# Patient Record
Sex: FEMALE | Race: WHITE | Employment: OTHER | URBAN - METROPOLITAN AREA
[De-identification: names, ages, dates, MRNs, and addresses within clinical notes are randomized per-mention and may not be internally consistent; named-entity substitution may affect disease eponyms.]

---

## 2017-10-24 PROBLEM — R05.8 POST-VIRAL COUGH SYNDROME: Status: ACTIVE | Noted: 2017-10-24

## 2017-11-08 PROBLEM — G89.29 CHRONIC RIGHT SHOULDER PAIN: Status: ACTIVE | Noted: 2017-11-08

## 2017-11-08 PROBLEM — M25.511 CHRONIC RIGHT SHOULDER PAIN: Status: ACTIVE | Noted: 2017-11-08

## 2017-12-04 ENCOUNTER — HOSPITAL ENCOUNTER (OUTPATIENT)
Dept: MAMMOGRAPHY | Age: 62
Discharge: HOME | End: 2017-12-04
Attending: OBSTETRICS & GYNECOLOGY
Payer: COMMERCIAL

## 2017-12-04 DIAGNOSIS — Z12.31 VISIT FOR SCREENING MAMMOGRAM: ICD-10-CM

## 2017-12-04 PROCEDURE — 77067 SCR MAMMO BI INCL CAD: CPT

## 2017-12-05 PROBLEM — F11.90 CHRONIC NARCOTIC USE: Status: ACTIVE | Noted: 2017-12-05

## 2018-02-19 PROBLEM — M25.511 CHRONIC RIGHT SHOULDER PAIN: Status: RESOLVED | Noted: 2017-11-08 | Resolved: 2018-02-19

## 2018-02-19 PROBLEM — G89.29 CHRONIC RIGHT SHOULDER PAIN: Status: RESOLVED | Noted: 2017-11-08 | Resolved: 2018-02-19

## 2018-04-17 PROBLEM — G89.29 OTHER CHRONIC PAIN: Status: ACTIVE | Noted: 2018-04-17

## 2018-08-28 PROBLEM — M48.062 SPINAL STENOSIS OF LUMBAR REGION WITH NEUROGENIC CLAUDICATION: Status: ACTIVE | Noted: 2018-08-28

## 2018-08-28 PROBLEM — M54.10 RADICULOPATHY AFFECTING UPPER EXTREMITY: Status: ACTIVE | Noted: 2018-08-28

## 2018-09-02 ENCOUNTER — HOSPITAL ENCOUNTER (EMERGENCY)
Age: 63
Discharge: HOME | End: 2018-09-02
Attending: EMERGENCY MEDICINE
Payer: MEDICARE

## 2018-09-02 VITALS
DIASTOLIC BLOOD PRESSURE: 63 MMHG | RESPIRATION RATE: 18 BRPM | SYSTOLIC BLOOD PRESSURE: 137 MMHG | TEMPERATURE: 98 F | HEART RATE: 81 BPM

## 2018-09-02 DIAGNOSIS — M54.2 CHRONIC NECK PAIN: Primary | ICD-10-CM

## 2018-09-02 DIAGNOSIS — G89.29 CHRONIC NECK PAIN: Primary | ICD-10-CM

## 2018-09-02 PROCEDURE — 99283 EMERGENCY DEPT VISIT LOW MDM: CPT | Performed by: EMERGENCY MEDICINE

## 2018-09-02 RX ORDER — DICLOFENAC SODIUM 50 MG/1
50 TABLET, DELAYED RELEASE ORAL 2 TIMES DAILY
Qty: 14 TAB | Refills: 0 | Status: SHIPPED | OUTPATIENT
Start: 2018-09-02 | End: 2018-10-10 | Stop reason: CLARIF

## 2018-09-02 RX ORDER — PREDNISONE 20 MG/1
20 TABLET ORAL DAILY
Qty: 5 TAB | Refills: 0 | Status: SHIPPED | OUTPATIENT
Start: 2018-09-02 | End: 2018-09-07

## 2018-09-02 NOTE — ED NOTES
I have reviewed discharge instructions with the patient. The patient verbalized understanding. Patient left ED via Discharge Method: ambulatory to Home with self. Opportunity for questions and clarification provided. Patient given 2 scripts. To continue your aftercare when you leave the hospital, you may receive an automated call from our care team to check in on how you are doing. This is a free service and part of our promise to provide the best care and service to meet your aftercare needs.  If you have questions, or wish to unsubscribe from this service please call [phone]. Thank you for Choosing our [facility] Emergency Department.

## 2018-09-02 NOTE — ED TRIAGE NOTES
Patient complains of tingling to hands with difficulty using hands after she writes or does similar tasks for more than 30 minutes at a time. Patient states she has had difficulties with her hands over the past 1 year due to damage to nerves in her neck and feels that it has been getting worse. Patient states she sees pain management for the nerve damage and other chronic back pain issues but the medications are not working well for her over the past 1 week. Patient states pain management did a nerve conduction study done in July and was unable to make her pain management appointment in august so cannot branden her regular medications until 9/10/18 when she has her next appointment.

## 2018-09-02 NOTE — DISCHARGE INSTRUCTIONS
Return with any fevers, vomiting, worsening symptoms, or additional concerns. Follow-up with your neck and back doctor in 3 or 4 days for reevaluation.

## 2018-10-09 RX ORDER — CEFAZOLIN SODIUM/WATER 2 G/20 ML
2 SYRINGE (ML) INTRAVENOUS ONCE
Status: CANCELLED | OUTPATIENT
Start: 2018-10-09 | End: 2018-10-09

## 2018-10-10 ENCOUNTER — HOSPITAL ENCOUNTER (OUTPATIENT)
Dept: SURGERY | Age: 63
Discharge: HOME | End: 2018-10-10
Payer: MEDICARE

## 2018-10-10 VITALS
SYSTOLIC BLOOD PRESSURE: 136 MMHG | HEART RATE: 80 BPM | BODY MASS INDEX: 22.32 KG/M2 | RESPIRATION RATE: 16 BRPM | HEIGHT: 63 IN | WEIGHT: 126 LBS | OXYGEN SATURATION: 99 % | TEMPERATURE: 98.3 F | DIASTOLIC BLOOD PRESSURE: 64 MMHG

## 2018-10-10 LAB
ANION GAP SERPL CALC-SCNC: 6 MMOL/L (ref 7–16)
APPEARANCE UR: CLEAR
BACTERIA SPEC CULT: NORMAL
BACTERIA URNS QL MICRO: ABNORMAL /HPF
BASOPHILS # BLD: 0 K/UL (ref 0–0.2)
BASOPHILS NFR BLD: 0 % (ref 0–2)
BILIRUB UR QL: NEGATIVE
BUN SERPL-MCNC: 10 MG/DL (ref 8–23)
CALCIUM SERPL-MCNC: 8.6 MG/DL (ref 8.3–10.4)
CASTS URNS QL MICRO: ABNORMAL /LPF
CHLORIDE SERPL-SCNC: 104 MMOL/L (ref 98–107)
CO2 SERPL-SCNC: 28 MMOL/L (ref 21–32)
COLOR UR: YELLOW
CREAT SERPL-MCNC: 0.83 MG/DL (ref 0.6–1)
DIFFERENTIAL METHOD BLD: ABNORMAL
EOSINOPHIL # BLD: 0.2 K/UL (ref 0–0.8)
EOSINOPHIL NFR BLD: 1 % (ref 0.5–7.8)
EPI CELLS #/AREA URNS HPF: ABNORMAL /HPF
ERYTHROCYTE [DISTWIDTH] IN BLOOD BY AUTOMATED COUNT: 13.9 %
GLUCOSE SERPL-MCNC: 119 MG/DL (ref 65–100)
GLUCOSE UR STRIP.AUTO-MCNC: NEGATIVE MG/DL
HCT VFR BLD AUTO: 36.8 % (ref 35.8–46.3)
HGB BLD-MCNC: 11.8 G/DL (ref 11.7–15.4)
HGB UR QL STRIP: ABNORMAL
IMM GRANULOCYTES # BLD: 0.1 K/UL (ref 0–0.5)
IMM GRANULOCYTES NFR BLD AUTO: 0 % (ref 0–5)
KETONES UR QL STRIP.AUTO: NEGATIVE MG/DL
LEUKOCYTE ESTERASE UR QL STRIP.AUTO: NEGATIVE
LYMPHOCYTES # BLD: 2.2 K/UL (ref 0.5–4.6)
LYMPHOCYTES NFR BLD: 19 % (ref 13–44)
MCH RBC QN AUTO: 30.9 PG (ref 26.1–32.9)
MCHC RBC AUTO-ENTMCNC: 32.1 G/DL (ref 31.4–35)
MCV RBC AUTO: 96.3 FL (ref 79.6–97.8)
MONOCYTES # BLD: 0.4 K/UL (ref 0.1–1.3)
MONOCYTES NFR BLD: 4 % (ref 4–12)
NEUTS SEG # BLD: 8.7 K/UL (ref 1.7–8.2)
NEUTS SEG NFR BLD: 75 % (ref 43–78)
NITRITE UR QL STRIP.AUTO: NEGATIVE
NRBC # BLD: 0 K/UL (ref 0–0.2)
PH UR STRIP: 6.5 [PH] (ref 5–9)
PLATELET # BLD AUTO: 306 K/UL (ref 150–450)
PMV BLD AUTO: 10.8 FL (ref 9.4–12.3)
POTASSIUM SERPL-SCNC: 3.6 MMOL/L (ref 3.5–5.1)
PROT UR STRIP-MCNC: NEGATIVE MG/DL
RBC # BLD AUTO: 3.82 M/UL (ref 4.05–5.2)
RBC #/AREA URNS HPF: ABNORMAL /HPF
SERVICE CMNT-IMP: NORMAL
SODIUM SERPL-SCNC: 138 MMOL/L (ref 136–145)
SP GR UR REFRACTOMETRY: 1.01 (ref 1–1.02)
UROBILINOGEN UR QL STRIP.AUTO: 0.2 EU/DL (ref 0.2–1)
WBC # BLD AUTO: 11.6 K/UL (ref 4.3–11.1)
WBC URNS QL MICRO: ABNORMAL /HPF

## 2018-10-10 PROCEDURE — 77030027138 HC INCENT SPIROMETER -A

## 2018-10-10 PROCEDURE — 81001 URINALYSIS AUTO W/SCOPE: CPT

## 2018-10-10 PROCEDURE — 87641 MR-STAPH DNA AMP PROBE: CPT

## 2018-10-10 PROCEDURE — 85025 COMPLETE CBC W/AUTO DIFF WBC: CPT

## 2018-10-10 PROCEDURE — 80048 BASIC METABOLIC PNL TOTAL CA: CPT

## 2018-10-10 RX ORDER — HYDROCODONE BITARTRATE AND ACETAMINOPHEN 7.5; 325 MG/1; MG/1
1 TABLET ORAL
Status: ON HOLD | COMMUNITY
End: 2018-10-17 | Stop reason: SDUPTHER

## 2018-10-10 RX ORDER — ASPIRIN 81 MG/1
81 TABLET ORAL DAILY
COMMUNITY
End: 2018-12-31 | Stop reason: CLARIF

## 2018-10-10 RX ORDER — MELOXICAM 7.5 MG/1
7.5 TABLET ORAL DAILY
COMMUNITY
End: 2019-06-07 | Stop reason: SDUPTHER

## 2018-10-10 NOTE — PERIOP NOTES
Patient verified name, , and surgery Order for consent  found in EHR and matches case posting TYPE  CASE: III  Preassessment complete Orders per surgeon:  received and dated Labs per surgeon: CBC, BMP; UA. Results:  
Labs per anesthesia protocol: T&S 
EKG:  NA Nasal Swab collected per MD order and instructions for Mupirocin nasal ointment if required. Patient provided with and instructed on education handouts including Guide to Surgery, blood transfusions, pain management, and hand hygiene for the family and community, and [facility] [place] brochure. Road to Recovery Spine surgery patient guide given. Instructed on incentive spirometry with return demonstration. Long handled prehab sponge given with instructions for use. Patient viewed spine prehab video. Hibiclens and instructions given per hospital policy. Instructed patient to continue previous medications as prescribed prior to surgery unless otherwise directed and to take the following medications the day of surgery according to anesthesia guidelines : norvasc, wellbutrin, klonopin prn, norco prn, lyrica. Instructed patient to hold  the following medications on the day of surgery: see MAR. Original medication prescription bottles visualized during patient appointment. Patient teach back successful and patient demonstrates knowledge of instruction.

## 2018-10-16 ENCOUNTER — ANESTHESIA EVENT (OUTPATIENT)
Dept: SURGERY | Age: 63
DRG: 454 | End: 2018-10-16
Payer: MEDICARE

## 2018-10-17 ENCOUNTER — APPOINTMENT (OUTPATIENT)
Dept: GENERAL RADIOLOGY | Age: 63
DRG: 454 | End: 2018-10-17
Attending: ORTHOPAEDIC SURGERY
Payer: MEDICARE

## 2018-10-17 ENCOUNTER — HOSPITAL ENCOUNTER (INPATIENT)
Age: 63
LOS: 2 days | Discharge: HOME HEALTH | DRG: 454 | End: 2018-10-19
Attending: ORTHOPAEDIC SURGERY | Admitting: ORTHOPAEDIC SURGERY
Payer: MEDICARE

## 2018-10-17 ENCOUNTER — ANESTHESIA (OUTPATIENT)
Dept: SURGERY | Age: 63
DRG: 454 | End: 2018-10-17
Payer: MEDICARE

## 2018-10-17 DIAGNOSIS — M54.10 RADICULOPATHY AFFECTING UPPER EXTREMITY: ICD-10-CM

## 2018-10-17 DIAGNOSIS — G99.2 STENOSIS OF CERVICAL SPINE WITH MYELOPATHY (HCC): Primary | ICD-10-CM

## 2018-10-17 DIAGNOSIS — M48.02 STENOSIS OF CERVICAL SPINE WITH MYELOPATHY (HCC): Primary | ICD-10-CM

## 2018-10-17 LAB
ABO + RH BLD: NORMAL
BLOOD GROUP ANTIBODIES SERPL: NORMAL
SPECIMEN EXP DATE BLD: NORMAL

## 2018-10-17 PROCEDURE — 77030016570 HC BLNKT BAIR HGGR 3M -B: Performed by: ANESTHESIOLOGY

## 2018-10-17 PROCEDURE — 77030020782 HC GWN BAIR PAWS FLX 3M -B: Performed by: ANESTHESIOLOGY

## 2018-10-17 PROCEDURE — 77030018673: Performed by: ORTHOPAEDIC SURGERY

## 2018-10-17 PROCEDURE — 77030025623 HC BUR RND PRECIS STRY -D: Performed by: ORTHOPAEDIC SURGERY

## 2018-10-17 PROCEDURE — 74011250636 HC RX REV CODE- 250/636: Performed by: ANESTHESIOLOGY

## 2018-10-17 PROCEDURE — 77030030163 HC BN WAX J&J -A: Performed by: ORTHOPAEDIC SURGERY

## 2018-10-17 PROCEDURE — 77030031139 HC SUT VCRL2 J&J -A: Performed by: ORTHOPAEDIC SURGERY

## 2018-10-17 PROCEDURE — 77030018836 HC SOL IRR NACL ICUM -A: Performed by: ORTHOPAEDIC SURGERY

## 2018-10-17 PROCEDURE — 77030002996 HC SUT SLK J&J -A: Performed by: ORTHOPAEDIC SURGERY

## 2018-10-17 PROCEDURE — 77030008703 HC TU ET UNCUF COVD -A: Performed by: ANESTHESIOLOGY

## 2018-10-17 PROCEDURE — 77030037710: Performed by: ORTHOPAEDIC SURGERY

## 2018-10-17 PROCEDURE — 74011250636 HC RX REV CODE- 250/636: Performed by: ORTHOPAEDIC SURGERY

## 2018-10-17 PROCEDURE — 77030011267 HC ELECTRD BLD COVD -A: Performed by: ORTHOPAEDIC SURGERY

## 2018-10-17 PROCEDURE — 77030008477 HC STYL SATN SLP COVD -A: Performed by: ANESTHESIOLOGY

## 2018-10-17 PROCEDURE — 65270000029 HC RM PRIVATE

## 2018-10-17 PROCEDURE — 77030018547 HC SUT ETHBND1 J&J -B: Performed by: ORTHOPAEDIC SURGERY

## 2018-10-17 PROCEDURE — 77030005518 HC CATH URETH FOL 2W BARD -B: Performed by: ORTHOPAEDIC SURGERY

## 2018-10-17 PROCEDURE — 72040 X-RAY EXAM NECK SPINE 2-3 VW: CPT

## 2018-10-17 PROCEDURE — 77030002933 HC SUT MCRYL J&J -A: Performed by: ORTHOPAEDIC SURGERY

## 2018-10-17 PROCEDURE — 77030014007 HC SPNG HEMSTAT J&J -B: Performed by: ORTHOPAEDIC SURGERY

## 2018-10-17 PROCEDURE — 77030012894: Performed by: ORTHOPAEDIC SURGERY

## 2018-10-17 PROCEDURE — 77030003861 HC BIT DRL STRY -C: Performed by: ORTHOPAEDIC SURGERY

## 2018-10-17 PROCEDURE — 0RG10A0 FUSION OF CERVICAL VERTEBRAL JOINT WITH INTERBODY FUSION DEVICE, ANTERIOR APPROACH, ANTERIOR COLUMN, OPEN APPROACH: ICD-10-PCS | Performed by: ORTHOPAEDIC SURGERY

## 2018-10-17 PROCEDURE — 0RT30ZZ RESECTION OF CERVICAL VERTEBRAL DISC, OPEN APPROACH: ICD-10-PCS | Performed by: ORTHOPAEDIC SURGERY

## 2018-10-17 PROCEDURE — 74011250636 HC RX REV CODE- 250/636

## 2018-10-17 PROCEDURE — 77030014647 HC SEAL FBRN TISSL BAXT -D: Performed by: ORTHOPAEDIC SURGERY

## 2018-10-17 PROCEDURE — 77030012406 HC DRN WND PENRS BARD -A: Performed by: ORTHOPAEDIC SURGERY

## 2018-10-17 PROCEDURE — 0RG10K1 FUSION OF CERVICAL VERTEBRAL JOINT WITH NONAUTOLOGOUS TISSUE SUBSTITUTE, POSTERIOR APPROACH, POSTERIOR COLUMN, OPEN APPROACH: ICD-10-PCS | Performed by: ORTHOPAEDIC SURGERY

## 2018-10-17 PROCEDURE — 74011000250 HC RX REV CODE- 250

## 2018-10-17 PROCEDURE — 77030039155 HC CGE SPN ANT CERV TRITANIUM STRY -G: Performed by: ORTHOPAEDIC SURGERY

## 2018-10-17 PROCEDURE — 77030013567 HC DRN WND RESERV BARD -A: Performed by: ORTHOPAEDIC SURGERY

## 2018-10-17 PROCEDURE — C1713 ANCHOR/SCREW BN/BN,TIS/BN: HCPCS | Performed by: ORTHOPAEDIC SURGERY

## 2018-10-17 PROCEDURE — 86901 BLOOD TYPING SEROLOGIC RH(D): CPT

## 2018-10-17 PROCEDURE — 76060000036 HC ANESTHESIA 2.5 TO 3 HR: Performed by: ORTHOPAEDIC SURGERY

## 2018-10-17 PROCEDURE — 76010000171 HC OR TIME 2 TO 2.5 HR INTENSV-TIER 1: Performed by: ORTHOPAEDIC SURGERY

## 2018-10-17 PROCEDURE — 74011250637 HC RX REV CODE- 250/637: Performed by: ORTHOPAEDIC SURGERY

## 2018-10-17 PROCEDURE — 77030009868 HC PIN DISTR CASPR AESC -B: Performed by: ORTHOPAEDIC SURGERY

## 2018-10-17 PROCEDURE — 77030032490 HC SLV COMPR SCD KNE COVD -B: Performed by: ORTHOPAEDIC SURGERY

## 2018-10-17 PROCEDURE — 77030039425 HC BLD LARYNG TRULITE DISP TELE -A: Performed by: ANESTHESIOLOGY

## 2018-10-17 PROCEDURE — 77030019908 HC STETH ESOPH SIMS -A: Performed by: ANESTHESIOLOGY

## 2018-10-17 PROCEDURE — 74011000250 HC RX REV CODE- 250: Performed by: ORTHOPAEDIC SURGERY

## 2018-10-17 PROCEDURE — 76210000000 HC OR PH I REC 2 TO 2.5 HR: Performed by: ORTHOPAEDIC SURGERY

## 2018-10-17 DEVICE — POLYAXIAL SCREW
Type: IMPLANTABLE DEVICE | Site: SPINE CERVICAL | Status: FUNCTIONAL
Brand: OASYS

## 2018-10-17 DEVICE — VARIABLE, SELF TAPPING SCREW
Type: IMPLANTABLE DEVICE | Site: SPINE CERVICAL | Status: FUNCTIONAL
Brand: AVIATOR

## 2018-10-17 DEVICE — ONE-LEVEL ANTERIOR PLATE
Type: IMPLANTABLE DEVICE | Site: SPINE CERVICAL | Status: FUNCTIONAL
Brand: AVIATOR

## 2018-10-17 DEVICE — ANTERIOR CERVICAL CAGE
Type: IMPLANTABLE DEVICE | Site: SPINE CERVICAL | Status: FUNCTIONAL
Brand: TRITANIUM C

## 2018-10-17 DEVICE — BLOCKER
Type: IMPLANTABLE DEVICE | Site: SPINE CERVICAL | Status: FUNCTIONAL
Brand: OASYS

## 2018-10-17 DEVICE — BIO DBM PLUS PUTTY (WITH CANCELLOUS)
Type: IMPLANTABLE DEVICE | Site: SPINE CERVICAL | Status: FUNCTIONAL
Brand: BIO DBM

## 2018-10-17 DEVICE — GRAFT BNE SUB 25CC 2MM GRAN ALLOGENIC MORPHOGENETIC PROT W: Type: IMPLANTABLE DEVICE | Site: SPINE CERVICAL | Status: FUNCTIONAL

## 2018-10-17 DEVICE — ROD
Type: IMPLANTABLE DEVICE | Site: SPINE CERVICAL | Status: FUNCTIONAL
Brand: OASYS

## 2018-10-17 RX ORDER — MORPHINE SULFATE 2 MG/ML
2 INJECTION, SOLUTION INTRAMUSCULAR; INTRAVENOUS
Status: DISCONTINUED | OUTPATIENT
Start: 2018-10-17 | End: 2018-10-18

## 2018-10-17 RX ORDER — ZOLPIDEM TARTRATE 5 MG/1
5 TABLET ORAL
Status: DISCONTINUED | OUTPATIENT
Start: 2018-10-17 | End: 2018-10-19 | Stop reason: HOSPADM

## 2018-10-17 RX ORDER — DOCUSATE SODIUM 100 MG/1
100 CAPSULE, LIQUID FILLED ORAL 2 TIMES DAILY
Status: DISCONTINUED | OUTPATIENT
Start: 2018-10-17 | End: 2018-10-19 | Stop reason: HOSPADM

## 2018-10-17 RX ORDER — LIDOCAINE HYDROCHLORIDE 20 MG/ML
INJECTION, SOLUTION EPIDURAL; INFILTRATION; INTRACAUDAL; PERINEURAL AS NEEDED
Status: DISCONTINUED | OUTPATIENT
Start: 2018-10-17 | End: 2018-10-17 | Stop reason: HOSPADM

## 2018-10-17 RX ORDER — HYDROCODONE BITARTRATE AND ACETAMINOPHEN 7.5; 325 MG/1; MG/1
1 TABLET ORAL
Qty: 40 TAB | Refills: 0 | Status: SHIPPED | OUTPATIENT
Start: 2018-10-17 | End: 2018-12-17

## 2018-10-17 RX ORDER — VALSARTAN 320 MG/1
160 TABLET ORAL DAILY
Status: DISCONTINUED | OUTPATIENT
Start: 2018-10-18 | End: 2018-10-19 | Stop reason: HOSPADM

## 2018-10-17 RX ORDER — SODIUM CHLORIDE, SODIUM LACTATE, POTASSIUM CHLORIDE, CALCIUM CHLORIDE 600; 310; 30; 20 MG/100ML; MG/100ML; MG/100ML; MG/100ML
75 INJECTION, SOLUTION INTRAVENOUS CONTINUOUS
Status: DISPENSED | OUTPATIENT
Start: 2018-10-17 | End: 2018-10-18

## 2018-10-17 RX ORDER — GLYCOPYRROLATE 0.2 MG/ML
INJECTION INTRAMUSCULAR; INTRAVENOUS AS NEEDED
Status: DISCONTINUED | OUTPATIENT
Start: 2018-10-17 | End: 2018-10-17 | Stop reason: HOSPADM

## 2018-10-17 RX ORDER — CEFAZOLIN SODIUM/WATER 2 G/20 ML
2 SYRINGE (ML) INTRAVENOUS EVERY 8 HOURS
Status: COMPLETED | OUTPATIENT
Start: 2018-10-17 | End: 2018-10-18

## 2018-10-17 RX ORDER — PROPOFOL 10 MG/ML
INJECTION, EMULSION INTRAVENOUS AS NEEDED
Status: DISCONTINUED | OUTPATIENT
Start: 2018-10-17 | End: 2018-10-17 | Stop reason: HOSPADM

## 2018-10-17 RX ORDER — SODIUM CHLORIDE 0.9 % (FLUSH) 0.9 %
5-10 SYRINGE (ML) INJECTION EVERY 8 HOURS
Status: DISCONTINUED | OUTPATIENT
Start: 2018-10-17 | End: 2018-10-19 | Stop reason: HOSPADM

## 2018-10-17 RX ORDER — BUPROPION HYDROCHLORIDE 150 MG/1
150 TABLET, EXTENDED RELEASE ORAL 2 TIMES DAILY
Status: DISCONTINUED | OUTPATIENT
Start: 2018-10-17 | End: 2018-10-19 | Stop reason: HOSPADM

## 2018-10-17 RX ORDER — OXYCODONE AND ACETAMINOPHEN 5; 325 MG/1; MG/1
1 TABLET ORAL AS NEEDED
Status: DISCONTINUED | OUTPATIENT
Start: 2018-10-17 | End: 2018-10-17 | Stop reason: HOSPADM

## 2018-10-17 RX ORDER — MEDROXYPROGESTERONE ACETATE 10 MG/1
2.5 TABLET ORAL DAILY
Status: DISCONTINUED | OUTPATIENT
Start: 2018-10-18 | End: 2018-10-19 | Stop reason: HOSPADM

## 2018-10-17 RX ORDER — CEFAZOLIN SODIUM/WATER 2 G/20 ML
2 SYRINGE (ML) INTRAVENOUS ONCE
Status: COMPLETED | OUTPATIENT
Start: 2018-10-17 | End: 2018-10-17

## 2018-10-17 RX ORDER — DEXAMETHASONE SODIUM PHOSPHATE 4 MG/ML
INJECTION, SOLUTION INTRA-ARTICULAR; INTRALESIONAL; INTRAMUSCULAR; INTRAVENOUS; SOFT TISSUE AS NEEDED
Status: DISCONTINUED | OUTPATIENT
Start: 2018-10-17 | End: 2018-10-17 | Stop reason: HOSPADM

## 2018-10-17 RX ORDER — SODIUM CHLORIDE, SODIUM LACTATE, POTASSIUM CHLORIDE, CALCIUM CHLORIDE 600; 310; 30; 20 MG/100ML; MG/100ML; MG/100ML; MG/100ML
75 INJECTION, SOLUTION INTRAVENOUS CONTINUOUS
Status: DISCONTINUED | OUTPATIENT
Start: 2018-10-17 | End: 2018-10-17 | Stop reason: HOSPADM

## 2018-10-17 RX ORDER — HYDROMORPHONE HYDROCHLORIDE 2 MG/ML
0.5 INJECTION, SOLUTION INTRAMUSCULAR; INTRAVENOUS; SUBCUTANEOUS
Status: DISCONTINUED | OUTPATIENT
Start: 2018-10-17 | End: 2018-10-17 | Stop reason: HOSPADM

## 2018-10-17 RX ORDER — SODIUM CHLORIDE, SODIUM LACTATE, POTASSIUM CHLORIDE, CALCIUM CHLORIDE 600; 310; 30; 20 MG/100ML; MG/100ML; MG/100ML; MG/100ML
INJECTION, SOLUTION INTRAVENOUS
Status: DISCONTINUED | OUTPATIENT
Start: 2018-10-17 | End: 2018-10-17 | Stop reason: HOSPADM

## 2018-10-17 RX ORDER — OXYCODONE HYDROCHLORIDE 5 MG/1
5-10 TABLET ORAL
Status: DISCONTINUED | OUTPATIENT
Start: 2018-10-17 | End: 2018-10-19 | Stop reason: HOSPADM

## 2018-10-17 RX ORDER — NEOSTIGMINE METHYLSULFATE 1 MG/ML
INJECTION INTRAVENOUS AS NEEDED
Status: DISCONTINUED | OUTPATIENT
Start: 2018-10-17 | End: 2018-10-17 | Stop reason: HOSPADM

## 2018-10-17 RX ORDER — PREGABALIN 50 MG/1
100 CAPSULE ORAL 2 TIMES DAILY
Status: DISCONTINUED | OUTPATIENT
Start: 2018-10-17 | End: 2018-10-19 | Stop reason: HOSPADM

## 2018-10-17 RX ORDER — LIDOCAINE HYDROCHLORIDE 10 MG/ML
0.1 INJECTION INFILTRATION; PERINEURAL AS NEEDED
Status: DISCONTINUED | OUTPATIENT
Start: 2018-10-17 | End: 2018-10-17 | Stop reason: HOSPADM

## 2018-10-17 RX ORDER — AMLODIPINE BESYLATE 5 MG/1
5 TABLET ORAL DAILY
Status: DISCONTINUED | OUTPATIENT
Start: 2018-10-18 | End: 2018-10-19 | Stop reason: HOSPADM

## 2018-10-17 RX ORDER — ROCURONIUM BROMIDE 10 MG/ML
INJECTION, SOLUTION INTRAVENOUS AS NEEDED
Status: DISCONTINUED | OUTPATIENT
Start: 2018-10-17 | End: 2018-10-17 | Stop reason: HOSPADM

## 2018-10-17 RX ORDER — VANCOMYCIN HYDROCHLORIDE 1 G/20ML
INJECTION, POWDER, LYOPHILIZED, FOR SOLUTION INTRAVENOUS AS NEEDED
Status: DISCONTINUED | OUTPATIENT
Start: 2018-10-17 | End: 2018-10-17 | Stop reason: HOSPADM

## 2018-10-17 RX ORDER — CYCLOBENZAPRINE HCL 10 MG
10 TABLET ORAL
Status: DISCONTINUED | OUTPATIENT
Start: 2018-10-17 | End: 2018-10-18

## 2018-10-17 RX ORDER — FAMOTIDINE 20 MG/1
20 TABLET, FILM COATED ORAL EVERY 12 HOURS
Status: DISCONTINUED | OUTPATIENT
Start: 2018-10-17 | End: 2018-10-17

## 2018-10-17 RX ORDER — SODIUM CHLORIDE 0.9 % (FLUSH) 0.9 %
5-10 SYRINGE (ML) INJECTION AS NEEDED
Status: DISCONTINUED | OUTPATIENT
Start: 2018-10-17 | End: 2018-10-17 | Stop reason: HOSPADM

## 2018-10-17 RX ORDER — FENTANYL CITRATE 50 UG/ML
INJECTION, SOLUTION INTRAMUSCULAR; INTRAVENOUS AS NEEDED
Status: DISCONTINUED | OUTPATIENT
Start: 2018-10-17 | End: 2018-10-17 | Stop reason: HOSPADM

## 2018-10-17 RX ORDER — ACETAMINOPHEN 325 MG/1
650 TABLET ORAL EVERY 6 HOURS
Status: DISCONTINUED | OUTPATIENT
Start: 2018-10-17 | End: 2018-10-19 | Stop reason: HOSPADM

## 2018-10-17 RX ORDER — ACETAMINOPHEN 10 MG/ML
INJECTION, SOLUTION INTRAVENOUS AS NEEDED
Status: DISCONTINUED | OUTPATIENT
Start: 2018-10-17 | End: 2018-10-17 | Stop reason: HOSPADM

## 2018-10-17 RX ORDER — NALOXONE HYDROCHLORIDE 0.4 MG/ML
0.2 INJECTION, SOLUTION INTRAMUSCULAR; INTRAVENOUS; SUBCUTANEOUS AS NEEDED
Status: DISCONTINUED | OUTPATIENT
Start: 2018-10-17 | End: 2018-10-17 | Stop reason: HOSPADM

## 2018-10-17 RX ORDER — HYDROMORPHONE HYDROCHLORIDE 2 MG/ML
INJECTION, SOLUTION INTRAMUSCULAR; INTRAVENOUS; SUBCUTANEOUS AS NEEDED
Status: DISCONTINUED | OUTPATIENT
Start: 2018-10-17 | End: 2018-10-17 | Stop reason: HOSPADM

## 2018-10-17 RX ORDER — ONDANSETRON 2 MG/ML
4 INJECTION INTRAMUSCULAR; INTRAVENOUS
Status: DISCONTINUED | OUTPATIENT
Start: 2018-10-17 | End: 2018-10-19 | Stop reason: HOSPADM

## 2018-10-17 RX ORDER — SODIUM CHLORIDE 0.9 % (FLUSH) 0.9 %
5-10 SYRINGE (ML) INJECTION AS NEEDED
Status: DISCONTINUED | OUTPATIENT
Start: 2018-10-17 | End: 2018-10-19 | Stop reason: HOSPADM

## 2018-10-17 RX ORDER — FAMOTIDINE 20 MG/1
20 TABLET, FILM COATED ORAL EVERY EVENING
Status: DISCONTINUED | OUTPATIENT
Start: 2018-10-17 | End: 2018-10-19 | Stop reason: HOSPADM

## 2018-10-17 RX ORDER — MIDAZOLAM HYDROCHLORIDE 1 MG/ML
2 INJECTION, SOLUTION INTRAMUSCULAR; INTRAVENOUS
Status: COMPLETED | OUTPATIENT
Start: 2018-10-17 | End: 2018-10-17

## 2018-10-17 RX ORDER — DIPHENHYDRAMINE HCL 25 MG
25 CAPSULE ORAL
Status: DISCONTINUED | OUTPATIENT
Start: 2018-10-17 | End: 2018-10-19 | Stop reason: HOSPADM

## 2018-10-17 RX ORDER — NALOXONE HYDROCHLORIDE 0.4 MG/ML
0.4 INJECTION, SOLUTION INTRAMUSCULAR; INTRAVENOUS; SUBCUTANEOUS
Status: DISCONTINUED | OUTPATIENT
Start: 2018-10-17 | End: 2018-10-19 | Stop reason: HOSPADM

## 2018-10-17 RX ORDER — HYDROMORPHONE HYDROCHLORIDE 2 MG/ML
0.5 INJECTION, SOLUTION INTRAMUSCULAR; INTRAVENOUS; SUBCUTANEOUS
Status: COMPLETED | OUTPATIENT
Start: 2018-10-17 | End: 2018-10-17

## 2018-10-17 RX ADMIN — BUPROPION HYDROCHLORIDE 150 MG: 150 TABLET, EXTENDED RELEASE ORAL at 21:19

## 2018-10-17 RX ADMIN — HYDROMORPHONE HYDROCHLORIDE 0.5 MG: 2 INJECTION, SOLUTION INTRAMUSCULAR; INTRAVENOUS; SUBCUTANEOUS at 11:06

## 2018-10-17 RX ADMIN — HYDROMORPHONE HYDROCHLORIDE 0.5 MG: 2 INJECTION, SOLUTION INTRAMUSCULAR; INTRAVENOUS; SUBCUTANEOUS at 11:24

## 2018-10-17 RX ADMIN — FENTANYL CITRATE 25 MCG: 50 INJECTION, SOLUTION INTRAMUSCULAR; INTRAVENOUS at 08:49

## 2018-10-17 RX ADMIN — LIDOCAINE HYDROCHLORIDE 60 MG: 20 INJECTION, SOLUTION EPIDURAL; INFILTRATION; INTRACAUDAL; PERINEURAL at 07:32

## 2018-10-17 RX ADMIN — OXYCODONE HYDROCHLORIDE 10 MG: 5 TABLET ORAL at 21:29

## 2018-10-17 RX ADMIN — Medication 3 AMPULE: at 05:56

## 2018-10-17 RX ADMIN — Medication 2 G: at 07:45

## 2018-10-17 RX ADMIN — MORPHINE SULFATE 2 MG: 2 INJECTION, SOLUTION INTRAMUSCULAR; INTRAVENOUS at 14:56

## 2018-10-17 RX ADMIN — ONDANSETRON 4 MG: 2 INJECTION INTRAMUSCULAR; INTRAVENOUS at 18:47

## 2018-10-17 RX ADMIN — FENTANYL CITRATE 50 MCG: 50 INJECTION, SOLUTION INTRAMUSCULAR; INTRAVENOUS at 08:04

## 2018-10-17 RX ADMIN — NEOSTIGMINE METHYLSULFATE 2 MG: 1 INJECTION INTRAVENOUS at 09:47

## 2018-10-17 RX ADMIN — ZOLPIDEM TARTRATE 5 MG: 5 TABLET ORAL at 21:19

## 2018-10-17 RX ADMIN — ROCURONIUM BROMIDE 5 MG: 10 INJECTION, SOLUTION INTRAVENOUS at 08:46

## 2018-10-17 RX ADMIN — Medication 10 ML: at 15:00

## 2018-10-17 RX ADMIN — ROCURONIUM BROMIDE 10 MG: 10 INJECTION, SOLUTION INTRAVENOUS at 08:16

## 2018-10-17 RX ADMIN — SODIUM CHLORIDE, SODIUM LACTATE, POTASSIUM CHLORIDE, AND CALCIUM CHLORIDE 75 ML/HR: 600; 310; 30; 20 INJECTION, SOLUTION INTRAVENOUS at 15:01

## 2018-10-17 RX ADMIN — HYDROMORPHONE HYDROCHLORIDE 0.5 MG: 2 INJECTION, SOLUTION INTRAMUSCULAR; INTRAVENOUS; SUBCUTANEOUS at 11:38

## 2018-10-17 RX ADMIN — SODIUM CHLORIDE, SODIUM LACTATE, POTASSIUM CHLORIDE, AND CALCIUM CHLORIDE 75 ML/HR: 600; 310; 30; 20 INJECTION, SOLUTION INTRAVENOUS at 05:56

## 2018-10-17 RX ADMIN — HYDROMORPHONE HYDROCHLORIDE 0.5 MG: 2 INJECTION, SOLUTION INTRAMUSCULAR; INTRAVENOUS; SUBCUTANEOUS at 10:51

## 2018-10-17 RX ADMIN — MIDAZOLAM HYDROCHLORIDE 2 MG: 2 INJECTION, SOLUTION INTRAMUSCULAR; INTRAVENOUS at 07:13

## 2018-10-17 RX ADMIN — GLYCOPYRROLATE 0.2 MG: 0.2 INJECTION INTRAMUSCULAR; INTRAVENOUS at 09:47

## 2018-10-17 RX ADMIN — Medication 10 ML: at 22:04

## 2018-10-17 RX ADMIN — FAMOTIDINE 20 MG: 20 TABLET ORAL at 17:10

## 2018-10-17 RX ADMIN — HYDROMORPHONE HYDROCHLORIDE 0.5 MG: 2 INJECTION, SOLUTION INTRAMUSCULAR; INTRAVENOUS; SUBCUTANEOUS at 10:23

## 2018-10-17 RX ADMIN — Medication 1 AMPULE: at 15:07

## 2018-10-17 RX ADMIN — ONDANSETRON 4 MG: 2 INJECTION INTRAMUSCULAR; INTRAVENOUS at 14:56

## 2018-10-17 RX ADMIN — Medication 2 G: at 16:00

## 2018-10-17 RX ADMIN — SODIUM CHLORIDE, SODIUM LACTATE, POTASSIUM CHLORIDE, CALCIUM CHLORIDE: 600; 310; 30; 20 INJECTION, SOLUTION INTRAVENOUS at 07:36

## 2018-10-17 RX ADMIN — MORPHINE SULFATE 2 MG: 2 INJECTION, SOLUTION INTRAMUSCULAR; INTRAVENOUS at 18:47

## 2018-10-17 RX ADMIN — ROCURONIUM BROMIDE 30 MG: 10 INJECTION, SOLUTION INTRAVENOUS at 07:33

## 2018-10-17 RX ADMIN — PREGABALIN 100 MG: 50 CAPSULE ORAL at 21:19

## 2018-10-17 RX ADMIN — HYDROMORPHONE HYDROCHLORIDE 0.5 MG: 2 INJECTION, SOLUTION INTRAMUSCULAR; INTRAVENOUS; SUBCUTANEOUS at 10:10

## 2018-10-17 RX ADMIN — FENTANYL CITRATE 100 MCG: 50 INJECTION, SOLUTION INTRAMUSCULAR; INTRAVENOUS at 07:32

## 2018-10-17 RX ADMIN — HYDROMORPHONE HYDROCHLORIDE 0.4 MG: 2 INJECTION, SOLUTION INTRAMUSCULAR; INTRAVENOUS; SUBCUTANEOUS at 09:31

## 2018-10-17 RX ADMIN — ACETAMINOPHEN 650 MG: 325 TABLET, FILM COATED ORAL at 17:10

## 2018-10-17 RX ADMIN — FENTANYL CITRATE 25 MCG: 50 INJECTION, SOLUTION INTRAMUSCULAR; INTRAVENOUS at 08:48

## 2018-10-17 RX ADMIN — HYDROMORPHONE HYDROCHLORIDE 0.5 MG: 2 INJECTION, SOLUTION INTRAMUSCULAR; INTRAVENOUS; SUBCUTANEOUS at 10:35

## 2018-10-17 RX ADMIN — OXYCODONE HYDROCHLORIDE 10 MG: 5 TABLET ORAL at 13:37

## 2018-10-17 RX ADMIN — PROPOFOL 200 MG: 10 INJECTION, EMULSION INTRAVENOUS at 07:32

## 2018-10-17 RX ADMIN — DEXAMETHASONE SODIUM PHOSPHATE 4 MG: 4 INJECTION, SOLUTION INTRA-ARTICULAR; INTRALESIONAL; INTRAMUSCULAR; INTRAVENOUS; SOFT TISSUE at 07:42

## 2018-10-17 RX ADMIN — Medication 1 AMPULE: at 21:20

## 2018-10-17 RX ADMIN — HYDROMORPHONE HYDROCHLORIDE 0.5 MG: 2 INJECTION, SOLUTION INTRAMUSCULAR; INTRAVENOUS; SUBCUTANEOUS at 10:18

## 2018-10-17 RX ADMIN — ACETAMINOPHEN 1000 MG: 10 INJECTION, SOLUTION INTRAVENOUS at 09:24

## 2018-10-17 RX ADMIN — OXYCODONE HYDROCHLORIDE 10 MG: 5 TABLET ORAL at 17:32

## 2018-10-17 NOTE — OP NOTES
[address] [address]   [phone]    OPERATIVE REPORT    Patient Suzanna Benitez  [id number]  1955  70 y.o. DATE OF SURGERY: 10/17/2018    SURGEON: Kevin Ramirez MD    PREOPERATIVE DIAGNOSIS:     1. Cervical stenosis C3-4 with myelopathy. 2. Cervical stenosis C6-7 with radiculopathy. POSTOPERATIVE DIAGNOSIS:     1. Cervical stenosis C3-4 with myelopathy. 2. Cervical stenosis C6-7 with radiculopathy. PROCEDURES:    1. Anterior cervical discectomy and fusion C6 - C7  2. Biomechanical device C6 - C7  3. Anterior cervical instrumentation C6-C7.  4., Posterior cervical arthrodesis C3 - C4.  5. Lateral mass instrumentation C3 - C4  6. Posterior cervical laminectomyC3 - C4 . ANESTHESIA: General.    ESTIMATED BLOOD LOSS: 200 cc    INTRAOPERATIVE COMPLICATIONS: None. INTRAOPERATIVE NEUROMONITORING: There were no significant sustained changes in somatosensory-evoked potential monitoring. [facility]AT[place] CONDITION: Stable. IMPLANTS:   Implant Name Type Inv.  Item Serial No.  Lot No. LRB No. Used Action   GRAFT BNE GRAN 2.5ML -- OSTEOAMP - [id number]  GRAFT BNE GRAN 2.5ML -- OSTEOAMP [id number] [employer]  N/A 1 Implanted   GRAFT DBM PTTY+ W/CHIP 10ML -- BIO DBM - [id number]  GRAFT DBM PTTY+ W/CHIP 10ML -- BIO DBM  SABINE SPINE HOWM [id number] N/A 1 Implanted   GRAFT DBM PTTY+ W/CHIP 10ML -- BIO DBM - [id number]  GRAFT DBM PTTY+ W/CHIP 10ML -- BIO DBM  SABINE SPINE HOWM [id number] N/A 1 Implanted   CAGE C ANTR CERV [id number] -- TRITANIUM - [id number]  CAGE C ANTR CERV [id number] -- TRITANIUM  SABINE SPINE HOWM [id number] N/A 1 Implanted   SCR SPNE POLYAXL BA 3.5X14MM -- OASYS OCCIPITO - [id number]  SCR SPNE POLYAXL BA 3.5X14MM -- OASYS OCCIPITO  SABINE SPINE HOWM [id number] N/A 4 Implanted   BLOCKER SET SCR LCK OASYS OCT --  - [id number]  BLOCKER SET SCR LCK OASYS OCT --   SABINE SPINE HOWM [id number] N/A 4 Implanted MALIK SPNE OASYS OCT 3.5X25MM TI --  - [id number]  MALIK SPNE OASYS OCT 3.5X25MM TI --   SABINE SPINE HOWM [id number] N/A 2 Implanted   SCR BNE VA ST 4X14MM -- AVIATOR - [id number]  SCR BNE VA ST 4X14MM -- AVIATOR  SABINE SPINE HOWM [id number] N/A 4 Implanted   PLATE ANTR CERV 1 LVL SZ 12MM -- AVIATOR - [id number]  PLATE ANTR CERV 1 LVL SZ 12MM -- AVIATOR  SABINE SPINE HOWM [id number] N/A 1 Implanted       INDICATIONS FOR PROCEDURE: The patient was felt to have evidence of cervical stenosis and radiculomyelopathy by history and physical exam. A cervical imaging study confirmed the presence of correlative cervical stenosis. Conservative efforts including physical therapy and medical treatments have been exhausted. In the outpatient setting the risks, benefits, and potential complications of the above listed procedure were discussed with the patient in detail and an informed consent was obtained. DESCRIPTION OF PROCEDURE:   After adequate induction of general anesthesia the patient was positioned supine on the operating table. The neuromonitoring technician applied the appropriate leads to continuously monitor somatosensory evoke potentials. Once all leads were established, the [employer] tongs were applied with 10 pounds. A shoulder roll was placed and the shoulders were taped caudally to facilitate intraoperative radiographic imaging. The neck was kept in a neutral position. Care was taken to pad all bony prominences. Preoperative antibiotics were given. The neck was prepped and draped in the usual sterile fashion. A time-out was called to confirm appropriate patient, proposed procedure and proposed incision site. With this confirmation an incision was created over the left anterior lateral aspect of the neck centered near the cricoid cartilage. Dissection was carried down through the platysma using electrocautery. A Stevenson-Callahan approach was then performed down to the anterior cervical spine.  A spinal needle was inserted in an interspace and a cross-table lateral fluoroscopic image was obtained. The appropriate level was marked with electrocautery and the spinal needle was removed. At this point the longus colli was elevated around the periphery of the appropriate disk space and Waterford retractors were inserted beneath the longus colli. Waterford pins were then inserted in the C6 - C7 vertebral bodies and distraction applied across the annulus fibrosus. The operating microscope was draped and brought to the sterile field. An annulotomy was performed with a 15 blade and a complete diskectomy was performed using pituitary and 3 mm Kerrison. The diskectomy was carried out to the lateral border of the uncovertebral joints bilaterally. A 4 mm bur was then used to trim the anterior osteophytes as well as flatten the vertebral endplates in preparation for arthrodesis. The anterior aspect of the uncovertebral joints were also resected using the bur. The posterior portions of the uncovertebral joints were taken down with a 2 mm Kerrison. An interval was developed in the posterior longitudinal ligament and annulus fibrosus with a micro nerve hook. A 2 mm Kerrison was then used to resect these structures out to the lateral border of the uncal vertebral joints bilaterally. A ball tipped nerve hook was used to palpate laterally and confirm no residual nerve root or spinal cord impingement. This was felt to be satisfactory bilaterally. The interbody sizing system was brought to the field and a size 6  lordotic spacer fit very nicely. The appropriate size spacer selected filled with allograft and impacted with a bone tamp and mallet after the wound was liberally irrigated. Next, a 4-hole anterior cervical plate was applied across the anterior C6-7 disc space with 14mm screws.      C-arm fluoroscopy was brought in and used to obtain AP and lateral images, both of which were felt to be satisfactory for appropriate spinal level, and graft placement. The wound was liberally irrigated. A 10-Upper sorbian round Greg-Mason drain was inserted through a separate ncision and the incision was closed in a layered fashion. Benzoin and Steri-Strips were applied. Sterile dressings were applied. The patient was then rolled supine in the rotisserie fashion. Care was taken to pad all bony prominences. The shoulders were taped caudally to facilitate intraoperative radiographic imaging. The posterior neck was prepped and draped in the usual sterile fashion. A time-out was called to confirm appropriate patient, proposed procedure, and proposed incision site. Preoperative antibiotics were administered. Neuromonitoring was continued. An incision was then created in the midline of the posterior aspect of the neck. Dissection was carried down through nuchal ligament to the level of the spinous processes. A Kocher clamp was attached to a spinous process and a cross-table lateral fluoroscopic image was obtained to confirm spinal level. With this confirmation, the spinous process was marked with a Leksell rongeur. The remaining soft tissue attachments were reflected in a subperiosteal fashion from the spinous processes and lamina out to the lateral borders of the lateral masses and facet joints from C3 - C4. Deep retractors were positioned to facilitate visualization of the entire wound. At this point the lateral masses were decorticated with a 4-mm bur. The facet joints were decorticated as well. The [place] Oasys posterior cervical instrumentation system was brought to the field and using the 14-mm drill guide,  holes were created in each of the lateral masses from C3 - C4 bilaterally. Each of the  holes were directed in about a 15-degree lateral and 15-degree cephalad direction. The ball tip was used to palpate each  hole and the Oasys screws were then inserted by hand. At this point, the rods were cut and bent into lordosis.  They were applied bilaterally and secured to the lateral mass screws with the set screws. The appropriate torque was applied at each level. C-arm fluoroscopy was brought in and used to confirm appropriate levels and appropriate hardware placement. This was felt to be satisfactory bilaterally. Bone marrow aspirate was obtained and spread over the allograft bone. The saúl was selected and cortical troughs were created bilaterally at the spinolaminar junction from  C3 - C4. The medial cortex was fractured with a Cho elevator and the  C3 - C4 laminae were lifted off of the thecal sac with a curette and a Leksell rongeur. With the central laminectomy completed, the 2-mm Kerrison was used to trim all bony fragments from the lateral edges of the laminectomy site and perform partial foraminotomies. The  C2 - C5 vertebrae were undercut with a Kerrison. With the laminectomy completed, marrow soaked allograft bone was packed into the facet joints and beneath the lateral mass instrumentation. The wound was then liberally irrigated and a medium Hemovac drain was inserted through a separate exiting incision. The nuchal ligament was approximated with a #2 Ethibond, and the subcutaneous tissue and skin was approximated in a layered fashion with Vicryl suture. Benzoin and Steri-Strips were applied and sterile dressings were applied to both wounds. The patient was then released from the [facility] and transferred to the [facility]. The patient tolerated the procedure well and was returned to the post anesthesia care unit in stable condition. At the end of the case all sponge, needle, and instrument counts were correct.      Jeovanny Hernandez MD

## 2018-10-17 NOTE — PERIOP NOTES
1020- Vanpelt at bedside. Orders to remove dias prior to pt going upstairs as well as anterior KRISS drain. 10:45 AM 
Kierra Manriquez called and notified that pt has been given 2 mg IV dilaudid in PACU, rates pain 9/10 and is 97% on room air. Orders to give up to another 2 mg IV dilaudid. 11:00 AM 
Report to Jyotsna Kraus, [address], liaison at  offered ] to come back and see patient. Per , he is sleeping in car and will come in later. 1:26 PM 
TRANSFER - OUT REPORT: 
 
Verbal report given to Amairani Singletary RN(name) on Marta Mari  being transferred to [facility](unit) for routine post - op Report consisted of patients Situation, Background, Assessment and  
Recommendations(SBAR). Information from the following report(s) SBAR, OR Summary, Procedure Summary, Intake/Output, MAR and Cardiac Rhythm NSR was reviewed with the receiving nurse. Lines:  
Peripheral IV 10/17/18 Left Arm (Active) Site Assessment Clean, dry, & intact 10/17/2018 11:22 AM  
Phlebitis Assessment 0 10/17/2018 11:22 AM  
Infiltration Assessment 0 10/17/2018 11:22 AM  
Dressing Status Clean, dry, & intact 10/17/2018 11:22 AM  
Dressing Type Tape;Transparent 10/17/2018 11:22 AM  
Hub Color/Line Status Flushed;Capped 10/17/2018 11:22 AM  
Alcohol Cap Used No 10/17/2018  9:57 AM  
   
Peripheral IV 10/17/18 Right Forearm (Active) Site Assessment Clean, dry, & intact 10/17/2018 11:22 AM  
Phlebitis Assessment 0 10/17/2018 11:22 AM  
Infiltration Assessment 0 10/17/2018 11:22 AM  
Dressing Status Clean, dry, & intact 10/17/2018 11:22 AM  
Dressing Type Tape;Transparent 10/17/2018 11:22 AM  
Hub Color/Line Status Green; Infusing 10/17/2018 11:22 AM  
Alcohol Cap Used No 10/17/2018  9:57 AM  
  
 
Opportunity for questions and clarification was provided. Patient transported with: 
 O2 @ 2 liters VTE prophylaxis orders have been written for Marta Mari. Patient and family given floor number and nurses name. Family updated re: pt status after security code verified. 1330- Anterior KRISS drain and dias cath removed per Vanpelt order. Aaron Notice at bedside for sign out.

## 2018-10-17 NOTE — H&P
This is a 75-year-old female who I had seen several times in the past with cervical and lumbar pathology. She has known cervical stenosis at C3-C4, C4-C5, and C6-C7. She has tried Lyrica and hydrocodone. She has also tried chiropractic care, pain medication, oral steroids. She had EMG and nerve conduction studies that failed to show peripheral compression neuropathy. More recently, she had an increase in the numbness and spasming in her hands. She went to the emergency department. She was prescribed oral steroids and diclofenac. She really doesnt perceive relief. She continues to have progressive loss of sensation in her fingertips and spasming of her hands after just a little bit of work. On exam, she has a positive Jamals on the left side. She also has a positive inverted brachioradialis reflex. She has mild loss of intrinsic function on the left side greater than the right. She has subjective paresthesias in the fingertips diffusely through both hands. Spurlings is positive. She also has light touch sensory disturbance over the bilateral forearms. Review of her MRI shows central stenosis at C3-C4 and severe bilateral foraminal stenosis at C6-C7 with loss of disc height. Assessment and plan: She has early cervical myelopathy and has exhausted conservative efforts. She is a candidate for surgery. I would recommend a C3-C4 laminectomy and fusion for the central stenosis and a C6-C7 ACDF for the collapsed disc and severe bilateral foraminal stenosis. She is not yet ready to commit to such a surgery. For now, I have prescribed gabapentin. Electronically Signed By Moises Evans MD  
 
 
Patient seen and examined on 10/17/2018. ROS is negative for fever and chills. Chest is clear to ascultation bilaterally. Heart is regular rate and rhythm. Indication for surgery still exists and patient understands risks of surgery and remains desirous to proceed. We will plan for a C6-7 anterior cervical discectomy and fusion with allograft and a C3-C4 posterior laminectomy and fusion with allograft and instrumentation.

## 2018-10-17 NOTE — ANESTHESIA POSTPROCEDURE EVALUATION
Procedure(s): SPINE ANTERIOR CERVICAL DISCECTOMY WITH FUSION C6-7  SSEP NEURO MONITOR SPINE POSTERIOR CERVICAL LAMINECTOMY, FUSION WITH ALLOGRAFT AND INSTRUMENTATION C3-4. Anesthesia Post Evaluation Multimodal analgesia: multimodal analgesia used between 6 hours prior to anesthesia start to PACU discharge Patient location during evaluation: PACU Patient participation: complete - patient participated Level of consciousness: awake and alert Pain management: adequate Airway patency: patent Anesthetic complications: no 
Cardiovascular status: acceptable Respiratory status: acceptable Hydration status: acceptable Comments: Nausea controlled Visit Vitals /77 Pulse 70 Temp 36.5 °C (97.7 °F) Resp 16 Ht 5' 3\" (1.6 m) Wt 56 kg (123 lb 6.4 oz) SpO2 98% BMI 21.86 kg/m²

## 2018-10-17 NOTE — PROGRESS NOTES
TRANSFER - IN REPORT: 
 
Verbal report received from [address] [place] [address] on Jutsin Bolden  being received from PACU for routine post - op Report consisted of patients Situation, Background, Assessment and  
Recommendations(SBAR). Information from the following report(s) SBAR, OR Summary, Procedure Summary, Intake/Output and MAR was reviewed with the receiving nurse. Opportunity for questions and clarification was provided. Assessment completed upon patients arrival to unit and care assumed.

## 2018-10-17 NOTE — ANESTHESIA PREPROCEDURE EVALUATION
Anesthetic History No history of anesthetic complications Review of Systems / Medical History Patient summary reviewed and pertinent labs reviewed Pulmonary Within defined limits Neuro/Psych Psychiatric history Comments: Right hand and arm weakness Cardiovascular Hypertension: well controlled Exercise tolerance: >4 METS 
  
GI/Hepatic/Renal 
  
GERD Endo/Other Arthritis Other Findings Physical Exam 
 
Airway Mallampati: II 
TM Distance: 4 - 6 cm Neck ROM: normal range of motion Mouth opening: Normal 
 
 Cardiovascular Rhythm: regular Dental 
No notable dental hx Pulmonary Breath sounds clear to auscultation Abdominal 
GI exam deferred Other Findings Anesthetic Plan ASA: 2 Anesthesia type: general 
 
 
 
 
 
Anesthetic plan and risks discussed with: Patient

## 2018-10-17 NOTE — PROGRESS NOTES
10/17/18 1400 Dual Skin Pressure Injury Assessment Dual Skin Pressure Injury Assessment WDL Second Care Provider (Based on [address] [place] [address]) Gamal Castillo RN Skin Integumentary Skin Integumentary (WDL) X Skin Condition/Temp Dry; Warm  
Skin Color Appropriate for ethnicity Skin Integrity Incision (comment) (anterior and posterior neck, not visualized) Patient admitted to room 604. Oriented to room and call light. Dual skin assessment completed with Gamal Castillo RN. Patient with no skin breakdown. Incisions to anterior and posterior neck, not visualized, dressings c/d/i.

## 2018-10-17 NOTE — PROGRESS NOTES
Patient c/o pain. Medicated per MAR. Hourly rounds performed through shift, pt denies needs at this time. Bed in low position and call light/ personal items within reach. Will continue to monitor and report to night shift nurse.

## 2018-10-17 NOTE — PROGRESS NOTES
's Pre-op visit requested by patient. Conveyed care and concern for patient and family. Offered prayer as requested for patient, family, and staff. Tess Hernandez MDiv, BS Board Certified [employer]

## 2018-10-18 ENCOUNTER — HOME HEALTH ADMISSION (OUTPATIENT)
Dept: HOME HEALTH SERVICES | Facility: HOME HEALTH | Age: 63
End: 2018-10-18
Payer: MEDICARE

## 2018-10-18 PROCEDURE — 97161 PT EVAL LOW COMPLEX 20 MIN: CPT

## 2018-10-18 PROCEDURE — 65270000029 HC RM PRIVATE

## 2018-10-18 PROCEDURE — 97165 OT EVAL LOW COMPLEX 30 MIN: CPT

## 2018-10-18 PROCEDURE — 74011250637 HC RX REV CODE- 250/637: Performed by: ORTHOPAEDIC SURGERY

## 2018-10-18 PROCEDURE — 97535 SELF CARE MNGMENT TRAINING: CPT

## 2018-10-18 PROCEDURE — 77030020255 HC SOL INJ LR 1000ML BG

## 2018-10-18 PROCEDURE — 74011250636 HC RX REV CODE- 250/636: Performed by: NURSE PRACTITIONER

## 2018-10-18 PROCEDURE — 74011250636 HC RX REV CODE- 250/636: Performed by: ORTHOPAEDIC SURGERY

## 2018-10-18 PROCEDURE — 97530 THERAPEUTIC ACTIVITIES: CPT

## 2018-10-18 RX ORDER — KETOROLAC TROMETHAMINE 15 MG/ML
15 INJECTION, SOLUTION INTRAMUSCULAR; INTRAVENOUS ONCE
Status: COMPLETED | OUTPATIENT
Start: 2018-10-19 | End: 2018-10-19

## 2018-10-18 RX ORDER — MORPHINE SULFATE 2 MG/ML
2 INJECTION, SOLUTION INTRAMUSCULAR; INTRAVENOUS
Status: DISCONTINUED | OUTPATIENT
Start: 2018-10-18 | End: 2018-10-19 | Stop reason: HOSPADM

## 2018-10-18 RX ORDER — DIAZEPAM 5 MG/1
5 TABLET ORAL
Status: DISCONTINUED | OUTPATIENT
Start: 2018-10-18 | End: 2018-10-19 | Stop reason: HOSPADM

## 2018-10-18 RX ORDER — KETOROLAC TROMETHAMINE 15 MG/ML
15 INJECTION, SOLUTION INTRAMUSCULAR; INTRAVENOUS
Status: DISCONTINUED | OUTPATIENT
Start: 2018-10-18 | End: 2018-10-18

## 2018-10-18 RX ORDER — KETOROLAC TROMETHAMINE 15 MG/ML
15 INJECTION, SOLUTION INTRAMUSCULAR; INTRAVENOUS ONCE
Status: DISCONTINUED | OUTPATIENT
Start: 2018-10-18 | End: 2018-10-18

## 2018-10-18 RX ORDER — KETOROLAC TROMETHAMINE 30 MG/ML
30 INJECTION, SOLUTION INTRAMUSCULAR; INTRAVENOUS ONCE
Status: COMPLETED | OUTPATIENT
Start: 2018-10-18 | End: 2018-10-18

## 2018-10-18 RX ORDER — KETOROLAC TROMETHAMINE 30 MG/ML
30 INJECTION, SOLUTION INTRAMUSCULAR; INTRAVENOUS ONCE
Status: DISCONTINUED | OUTPATIENT
Start: 2018-10-18 | End: 2018-10-18

## 2018-10-18 RX ADMIN — Medication 10 ML: at 06:04

## 2018-10-18 RX ADMIN — DOCUSATE SODIUM 100 MG: 100 CAPSULE, LIQUID FILLED ORAL at 17:51

## 2018-10-18 RX ADMIN — MEDROXYPROGESTERONE ACETATE 2.5 MG: 10 TABLET ORAL at 10:00

## 2018-10-18 RX ADMIN — SODIUM CHLORIDE, SODIUM LACTATE, POTASSIUM CHLORIDE, AND CALCIUM CHLORIDE 75 ML/HR: 600; 310; 30; 20 INJECTION, SOLUTION INTRAVENOUS at 01:10

## 2018-10-18 RX ADMIN — ACETAMINOPHEN 650 MG: 325 TABLET, FILM COATED ORAL at 13:11

## 2018-10-18 RX ADMIN — Medication 2 G: at 00:54

## 2018-10-18 RX ADMIN — FAMOTIDINE 20 MG: 20 TABLET ORAL at 17:51

## 2018-10-18 RX ADMIN — ACETAMINOPHEN 650 MG: 325 TABLET, FILM COATED ORAL at 01:07

## 2018-10-18 RX ADMIN — OXYCODONE HYDROCHLORIDE 10 MG: 5 TABLET ORAL at 10:52

## 2018-10-18 RX ADMIN — MORPHINE SULFATE 2 MG: 2 INJECTION, SOLUTION INTRAMUSCULAR; INTRAVENOUS at 00:54

## 2018-10-18 RX ADMIN — CYCLOBENZAPRINE HYDROCHLORIDE 10 MG: 10 TABLET, FILM COATED ORAL at 10:02

## 2018-10-18 RX ADMIN — KETOROLAC TROMETHAMINE 30 MG: 30 INJECTION, SOLUTION INTRAMUSCULAR at 17:52

## 2018-10-18 RX ADMIN — AMLODIPINE BESYLATE 5 MG: 5 TABLET ORAL at 10:01

## 2018-10-18 RX ADMIN — Medication 10 ML: at 22:13

## 2018-10-18 RX ADMIN — ESTROGENS, CONJUGATED 1.25 MG: 0.62 TABLET, FILM COATED ORAL at 09:58

## 2018-10-18 RX ADMIN — VALSARTAN 160 MG: 320 TABLET, FILM COATED ORAL at 10:00

## 2018-10-18 RX ADMIN — ONDANSETRON 4 MG: 2 INJECTION INTRAMUSCULAR; INTRAVENOUS at 00:54

## 2018-10-18 RX ADMIN — ACETAMINOPHEN 650 MG: 325 TABLET, FILM COATED ORAL at 06:02

## 2018-10-18 RX ADMIN — DOCUSATE SODIUM 100 MG: 100 CAPSULE, LIQUID FILLED ORAL at 10:00

## 2018-10-18 RX ADMIN — BENZOCAINE AND MENTHOL 1 LOZENGE: 15; 2.6 LOZENGE ORAL at 17:52

## 2018-10-18 RX ADMIN — Medication 10 ML: at 13:14

## 2018-10-18 RX ADMIN — OXYCODONE HYDROCHLORIDE 10 MG: 5 TABLET ORAL at 15:40

## 2018-10-18 RX ADMIN — PREGABALIN 100 MG: 50 CAPSULE ORAL at 22:12

## 2018-10-18 RX ADMIN — ZOLPIDEM TARTRATE 5 MG: 5 TABLET ORAL at 22:12

## 2018-10-18 RX ADMIN — Medication 1 AMPULE: at 09:59

## 2018-10-18 RX ADMIN — Medication 1 AMPULE: at 22:12

## 2018-10-18 RX ADMIN — ACETAMINOPHEN 650 MG: 325 TABLET, FILM COATED ORAL at 17:51

## 2018-10-18 RX ADMIN — MORPHINE SULFATE 2 MG: 2 INJECTION, SOLUTION INTRAMUSCULAR; INTRAVENOUS at 05:46

## 2018-10-18 RX ADMIN — OXYCODONE HYDROCHLORIDE 10 MG: 5 TABLET ORAL at 03:13

## 2018-10-18 RX ADMIN — BUPROPION HYDROCHLORIDE 150 MG: 150 TABLET, EXTENDED RELEASE ORAL at 09:59

## 2018-10-18 RX ADMIN — ONDANSETRON 4 MG: 2 INJECTION INTRAMUSCULAR; INTRAVENOUS at 05:46

## 2018-10-18 RX ADMIN — MORPHINE SULFATE 2 MG: 2 INJECTION, SOLUTION INTRAMUSCULAR; INTRAVENOUS at 16:16

## 2018-10-18 RX ADMIN — PREGABALIN 100 MG: 50 CAPSULE ORAL at 09:59

## 2018-10-18 RX ADMIN — Medication 2 G: at 09:57

## 2018-10-18 NOTE — PROGRESS NOTES
ORTHO PROGRESS NOTE 2018 Admit Date: 10/17/2018 Admit Diagnosis: Stenosis of cervical spine with myelopathy [M47.12] Post Op day: 1 Day Post-Op Subjective:  
 
Harmeet Spears is a patient who is now 1 Day Post-Op  and has complaints  of pain and stiffness. Objective:  
 
PT/OT: independent Vital Signs:   
Patient Vitals for the past 8 hrs: 
 BP Temp Pulse Resp SpO2  
10/18/18 0811 148/73 98.3 °F (36.8 °C) 79 14 97 % Temp (24hrs), Av.3 °F (36.8 °C), Min:97.7 °F (36.5 °C), Max:98.7 °F (37.1 °C) LAB:   
No results for input(s): HGB, WBC, PLT, HGBEXT, PLTEXT in the last 72 hours. I/O: 
No intake/output data recorded. 10/16 1901 - 10/18 0700 In: 2237 [I.V.:1550] Out: 1490 [Urine:1150; Drains:140] Physical Exam: 
 
Awake and in no acute distress. Mood and affect appropriate. Respirations unlabored and no evidence cyanosis. Calves nontender. Abdomen soft and nontender. Dressing clean/dry No new neurologic deficit. Assessment:  
  
Patient Active Problem List  
Diagnosis Code  Hypertension I10  Fatigue R53.83  Reflux esophagitis K21.0  Neck pain M54.2  Sinus congestion R09.81  
 Microhematuria R31.29  
 B12 deficiency E53.8  Renal cyst, congenital, right Q61.00  Depression with anxiety F41.8  Hx of fracture of pelvis Z87.81  Back pain M54.9  Post-viral cough syndrome R05  Chronic narcotic use F11.90  
 Other chronic pain G89.29  Spinal stenosis of lumbar region with neurogenic claudication M48.062  Radiculopathy affecting upper extremity M54.10  Stenosis of cervical spine with myelopathy M47.12  
 
 
1 Day Post-Op STATUS POST Procedure(s): SPINE ANTERIOR CERVICAL DISCECTOMY WITH FUSION C6-7  SSEP NEURO MONITOR SPINE POSTERIOR CERVICAL LAMINECTOMY, FUSION WITH ALLOGRAFT AND INSTRUMENTATION C3-4 Plan:  
 
Continue PT/OT/Rehab Discontinue: drain Consult: none Anticipate discharge to: HOME tomorrow Will adjust meds for pain control. Would recommend using PRN muscle relaxers.   
   
Signed By: Michael Linder NP

## 2018-10-18 NOTE — PROGRESS NOTES
Tonio Tran phoned primary RN. New orders to d/c hemovac drain. Procedure reviewed with Miss Dalton Morales. Also, new orders for 30 mg toradol IV x1 now, and 15 mg toradol x1 IV in 6 hours. Repeated and verified.

## 2018-10-18 NOTE — PROGRESS NOTES
[address] Face to Face Encounter Patients Name: Ismael Mahajan    YOB: 1955 Ordering Physician: Dr. 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 1. I certify that the patient needs intermittent care as follows: physical therapy: strengthening, stretching/ROM, transfer training, gait/stair training, balance training and pt/caregiver education 2. I certify that this patient is homebound, that is: 1) patient requires the use of a  device, special transportation, or assistance of another to leave the home; or 2) patient's condition makes leaving the home medically contraindicated; and 3) patient has a normal inability to leave the home and leaving the home requires considerable and taxing effort. Patient may leave the home for infrequent and short duration for medical reasons, and occasional absences for non-medical reasons. Homebound status is due to the following functional limitations: Patient with strength deficits limiting the performance of all ADL's without caregiver assistance or the use of an assistive device. 3. I certify that this patient is under my care and that I, or a nurse practitioner or  [id number], or clinical nurse specialist, or certified nurse midwife, working with me, had a Face-to-Face Encounter that meets the physician Face-to-Face Encounter requirements. The following are the clinical findings from the [address] [place] [address] encounter that support the need for skilled services and is a summary of the encounter: see hospital chart See hospital chart Elle Agarwal RN 
10/18/2018 THE FOLLOWING TO BE COMPLETED BY THE COMMUNITY PHYSICIAN: 
 
I concur with the findings described above from the [facility] encounter that this patient is homebound and in need of a skilled service. Certifying Physician: _____________________________________ Printed Certifying Physician Name: _____________________________________ Date: _________________

## 2018-10-18 NOTE — PROGRESS NOTES
Met with patient at bedside. Patient is alert and oriented. Patient lives at home with spouse. Verified PCP and insurance with patient. Patient states discharge plan is to return home. Patient is agreeable to [facility]. Referral sent to [facility]. liaisons notified. CM will continue to follow for discharge needs. Care Management Interventions PCP Verified by CM: Yes Mode of Transport at Discharge: Other (see comment) Transition of Care Consult (CM Consult): Discharge Planning, Home Health [address]: Yes Discharge Durable Medical Equipment: No 
Physical Therapy Consult: Yes Occupational Therapy Consult: Yes Current Support Network: Own Home, Lives with Spouse Confirm Follow Up Transport: Family Plan discussed with Pt/Family/Caregiver: Yes Freedom of Choice Offered: Yes Discharge Location Discharge Placement: Home with home health

## 2018-10-18 NOTE — PROGRESS NOTES
Problem: Mobility Impaired (Adult and Pediatric) Goal: *Acute Goals and Plan of Care (Insert Text) LTG: 
(1.)Ms. Ally Blevins will move from supine to sit and sit to supine, scoot up and down and roll side to side INDEPENDENTLY within 7 treatment day(s). (2.)Ms. Ally Blevins will transfer from bed to chair and chair to bed INDEPENDENTLY within 7 treatment day(s). (3.)Ms. Ally Blevins will ambulate INDEPENDENTLY for 500+ feet with the least restrictive device within 7 treatment day(s). ________________________________________________________________________________________________ PHYSICAL THERAPY: Daily Note, Treatment Day: Day of Assessment, PM 10/18/2018INPATIENT: Hospital Day: 2 [place] / Product Type: Medicare /  
  
NAME/AGE/GENDER: Tenzin Ott is a 70 y.o. 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  
 · Generalized Muscle Weakness (M62.81) · Difficulty in walking, Not elsewhere classified (R26.2) · Other abnormalities of gait and mobility (R26.89) Precaution/Allergies: 
Latex, natural rubber; Celebrex [celecoxib]; and Mirtazapine ASSESSMENT:  
Ms. Ally Blevins presents in supine with continued post op soreness. Agreeable to PT treatment. Reviewed log roll with pt who transfers to sitting with supervision. Ambulates 450 ft again with CGA-SBA and verbal cues for gait safety, technique. Returned to room and up to bathroom again, then to recliner. Positioned comfortably with needs in reach.  Performs similarly to this morning with mobility, reports pain continues to limit her and tired from lack of sleep. Will continue with therapy efforts to address strength, activity tolerance. Anticipate return home at [place] with [place] [facility]. This section established at most recent assessment PROBLEM LIST (Impairments causing functional limitations): 1. Decreased Strength 2. Decreased Transfer Abilities 3. Decreased Ambulation Ability/Technique 4. Decreased Balance 5. Increased Pain 6. Decreased Activity Tolerance INTERVENTIONS PLANNED: (Benefits and precautions of physical therapy have been discussed with the patient.) 1. Balance Exercise 2. Bed Mobility 3. Gait Training 4. Therapeutic Activites 5. Therapeutic Exercise/Strengthening 6. Transfer Training TREATMENT PLAN: Frequency/Duration: twice daily for duration of hospital stayRehabilitation Potential For Stated Goals: Good RECOMMENDED REHABILITATION/EQUIPMENT: (at time of discharge pending progress): Due to the probability of continued deficits (see above) this patient will likely need continued skilled physical therapy after discharge. Equipment:  
? None at this time HISTORY:  
History of Present Injury/Illness (Reason for Referral): 
Per H&P, \"This is a 75-year-old female who I had seen several times in the past with cervical and lumbar pathology. She has known cervical stenosis at C3-C4, C4-C5, and C6-C7. She has tried Lyrica and hydrocodone. She has also tried chiropractic care, pain medication, oral steroids. She had EMG and nerve conduction studies that failed to show peripheral compression neuropathy. More recently, she had an increase in the numbness and spasming in her hands. She went to the emergency department. She was prescribed oral steroids and diclofenac. She really doesnt perceive relief. She continues to have progressive loss of sensation in her fingertips and spasming of her hands after just a little bit of work. 
  
On exam, she has a positive Jamals on the left side.   She also has a positive inverted brachioradialis reflex. She has mild loss of intrinsic function on the left side greater than the right. She has subjective paresthesias in the fingertips diffusely through both hands. Spurlings is positive. She also has light touch sensory disturbance over the bilateral forearms. 
  
Review of her MRI shows central stenosis at C3-C4 and severe bilateral foraminal stenosis at C6-C7 with loss of disc height. 
  
Assessment and plan: She has early cervical myelopathy and has exhausted conservative efforts. She is a candidate for surgery. I would recommend a C3-C4 laminectomy and fusion for the central stenosis and a C6-C7 ACDF for the collapsed disc and severe bilateral foraminal stenosis. She is not yet ready to commit to such a surgery. For now, I have prescribed gabapentin. \" 
 
Past Medical History/Comorbidities: Ms. Supriya Engel  has a past medical history of Abnormal Papanicolaou smear of cervix, Anemia, Arthritis, Bowel trouble, Chronic pain, Depression, Elective , Fatigue, GERD (gastroesophageal reflux disease), Hypertension, Menopause, Miscarriage, Neck pain, Pain in breast, Pelvis fracture, right ([address] Psychiatric disorder, Reflux esophagitis, and Sinus congestion. Ms. Supriya Engel  has a past surgical history that includes us guided core breast biopsy; hx colonoscopy (2009); hx endoscopy; hx orthopaedic (Left); hx cataract removal (2017); SPINE ANTERIOR CERVICAL DISCECTOMY WITH FUSION C6-7  SSEP NEURO MONITOR (N/A, 10/17/2018); and SPINE POSTERIOR CERVICAL LAMINECTOMY, FUSION WITH ALLOGRAFT AND INSTRUMENTATION C3-4 (N/A, 10/17/2018). Social History/Living Environment:  
Home Environment: Private residence # Steps to Enter: 2 One/Two Story Residence: Two story, live on 1st floor Living Alone: No 
Support Systems: Spouse/Significant Other/Partner Patient Expects to be Discharged to[de-identified] Private residence Current DME Used/Available at Home: Walker, rolling Tub or Shower Type: Tub/Shower combination Prior Level of Function/Work/Activity: 
Lives with ; independent without use of any DME. Does have cane and walker, doesn't use. Pt denies recent falls but reports she has been unsteady. Number of Personal Factors/Comorbidities that affect the Plan of Care: 1-2: MODERATE COMPLEXITY EXAMINATION:  
Most Recent Physical Functioning:  
Gross Assessment: 
AROM: Within functional limits Strength: Generally decreased, functional 
Coordination: Within functional limits Posture: 
Posture (WDL): Exceptions to [facility] Posture Assessment: Forward head, Rounded shoulders Balance: 
Sitting: Intact Standing: Impaired Standing - Static: Good Standing - Dynamic : Fair(+) Bed Mobility: 
Rolling: Supervision Supine to Sit: Supervision Scooting: Supervision Wheelchair Mobility: 
  
Transfers: 
Sit to Stand: Contact guard assistance;Stand-by assistance Stand to Sit: Stand-by assistance Bed to Chair: Contact guard assistance;Stand-by assistance Interventions: Verbal cues; Safety awareness training Duration: 19 Minutes Gait: 
  
Base of Support: Narrowed Speed/Erma: Slow;Delayed Step Length: Left shortened;Right shortened Gait Abnormalities: Trunk sway increased;Decreased step clearance Distance (ft): 450 Feet (ft) Assistive Device: (none) Ambulation - Level of Assistance: Contact guard assistance Interventions: Verbal cues; Safety awareness training Body Structures Involved: 1. Nerves 2. Joints 3. Muscles Body Functions Affected: 1. Sensory/Pain 2. Neuromusculoskeletal 
3. Movement Related 4. Skin Related Activities and Participation Affected: 1. General Tasks and Demands 2. Mobility 3. Domestic Life 4. Community, Social and [place] Number of elements that affect the Plan of Care: 4+: HIGH COMPLEXITY CLINICAL PRESENTATION:  
Presentation: Stable and uncomplicated: LOW COMPLEXITY CLINICAL DECISION MAKING:  
 [facility] Basic Mobility Inpatient Short Form How much difficulty does the patient currently have. .. Unable A Lot A Little None 1. Turning over in bed (including adjusting bedclothes, sheets and blankets)? [] 1   [] 2   [] 3   [x] 4  
2. Sitting down on and standing up from a chair with arms ( e.g., wheelchair, bedside commode, etc.)   [] 1   [] 2   [x] 3   [] 4  
3. Moving from lying on back to sitting on the side of the bed? [] 1   [] 2   [] 3   [x] 4 How much help from another person does the patient currently need. .. Total A Lot A Little None 4. Moving to and from a bed to a chair (including a wheelchair)? [] 1   [] 2   [x] 3   [] 4  
5. Need to walk in hospital room? [] 1   [] 2   [x] 3   [] 4  
6. Climbing 3-5 steps with a railing? [] 1   [] 2   [x] 3   [] 4  
© 2007, Trustees of [address] [place] [address], under license to [employer]. All rights reserved Score:  Initial: 20 Most Recent: X (Date: -- ) Interpretation of Tool:  Represents activities that are increasingly more difficult (i.e. Bed mobility, Transfers, Gait). Score 24 23 22-20 19-15 14-10 9-7 6 Modifier CH CI CJ CK CL CM CN   
 
? Mobility - Walking and Moving Around:  
  - CURRENT STATUS: CJ - 20%-39% impaired, limited or restricted  - GOAL STATUS: CI - 1%-19% impaired, limited or restricted  - D/C STATUS:  ---------------To be determined--------------- [place] / [place] / Product Type: Medicare /   
 
Medical Necessity:    
· Patient demonstrates good rehab potential due to higher previous functional level. Reason for Services/Other Comments: 
· Patient continues to demonstrate capacity to improve strength, balance, activity tolerance which will increase independence, decrease amount of assistance required from caregiver and increase safety.   
Use of outcome tool(s) and clinical judgement create a POC that gives a: Clear prediction of patient's progress: LOW COMPLEXITY  
  
 
 
 
TREATMENT:  
(In addition to Assessment/Re-Assessment sessions the following treatments were rendered) Pre-treatment Symptoms/Complaints:  \"I'll try, I don't know how much I feel like doing\" Pain: Initial:  
Pain Intensity 1: 3 Pain Location 1: Incisional 
Pain Intervention(s) 1: Repositioned, Ambulation/Increased Activity  Post Session:  0/10 Therapeutic Activity: (  19 Minutes ):  Therapeutic activities including Bed mobility, Chair transfers, Toilet transfers, Ambulation on level ground and standing activities in bathroom to improve mobility, strength, balance, coordination and activity tolerance. Required minimal Verbal cues; Safety awareness training to promote static and dynamic balance in standing and promote motor control of bilateral, lower extremity(s). Braces/Orthotics/Lines/Etc:  
· IV 
· drain hemovac · O2 Device: Room air Treatment/Session Assessment:   
· Response to Treatment:  Unchanged; Pt performs mobility with CGA · Interdisciplinary Collaboration:  
o Physical Therapist 
o Registered Nurse · After treatment position/precautions:  
o Up in chair 
o Bed/Chair-wheels locked 
o Bed in low position 
o Call light within reach 
o Family at bedside · Compliance with Program/Exercises: Will assess as treatment progresses. · Recommendations/Intent for next treatment session: \"Next visit will focus on advancements to more challenging activities and reduction in assistance provided\". Total Treatment Duration: PT Patient Time In/Time Out Time In: 1347 Time Out: 1406 Vivi Mathew DPT

## 2018-10-18 NOTE — PROGRESS NOTES
Patient IVF in progress. Hemovac charged and intact. Patient rested interval. Patient aware of pain mor after anesthesia  wore off.

## 2018-10-18 NOTE — PROGRESS NOTES
Problem: Mobility Impaired (Adult and Pediatric) Goal: *Acute Goals and Plan of Care (Insert Text) LTG: 
(1.)Ms. Nicolle Noland will move from supine to sit and sit to supine, scoot up and down and roll side to side INDEPENDENTLY within 7 treatment day(s). (2.)Ms. Nicolle Noland will transfer from bed to chair and chair to bed INDEPENDENTLY within 7 treatment day(s). (3.)Ms. Nicolle Noland will ambulate INDEPENDENTLY for 500+ feet with the least restrictive device within 7 treatment day(s). ________________________________________________________________________________________________ PHYSICAL THERAPY: Initial Assessment, AM 10/18/2018INPATIENT: Hospital Day: 2 [place] / Product Type: Medicare /  
  
NAME/AGE/GENDER: Jess Lugo is a 70 y.o. 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  
 · Generalized Muscle Weakness (M62.81) · Difficulty in walking, Not elsewhere classified (R26.2) · Other abnormalities of gait and mobility (R26.89) Precaution/Allergies: 
Latex, natural rubber; Celebrex [celecoxib]; and Mirtazapine ASSESSMENT:  
Ms. Nicolle Noland is a 70year old female seen for initial therapy assessment following above procedure due to progressive cervical myelopathy and UE symptoms. She presents sitting up in bed with expected post op soreness. Agreeable to mobility. Reviewed log roll technique and post op cervical precautions. Transfers to sitting with SBA-supervision.  Pt ambulates 450 ft in room and hallway, initially with IV pole then with no DME but close CGA and verbal cues for safety. Takes small, slow steps with decreased gait pace and mild trunk sway at times. Returned to room and up to bathroom with CGA-SBA for toilet transfers and standing functional activities. Up to chair after session with needs in reach. Phoebe Britton is functioning below baseline with strength and mobility and will benefit from continued therapy during acute care stay to maximize safety/independence with mobility. Anticipate return home [facility] [place] with [facility]. This section established at most recent assessment PROBLEM LIST (Impairments causing functional limitations): 1. Decreased Strength 2. Decreased Transfer Abilities 3. Decreased Ambulation Ability/Technique 4. Decreased Balance 5. Increased Pain 6. Decreased Activity Tolerance INTERVENTIONS PLANNED: (Benefits and precautions of physical therapy have been discussed with the patient.) 1. Balance Exercise 2. Bed Mobility 3. Gait Training 4. Therapeutic Activites 5. Therapeutic Exercise/Strengthening 6. Transfer Training TREATMENT PLAN: Frequency/Duration: twice daily for duration of hospital stayRehabilitation Potential For Stated Goals: Good RECOMMENDED REHABILITATION/EQUIPMENT: (at time of discharge pending progress): Due to the probability of continued deficits (see above) this patient will likely need continued skilled physical therapy after discharge. Equipment:  
? None at this time HISTORY:  
History of Present Injury/Illness (Reason for Referral): 
Per H&P, \"This is a 38-year-old female who I had seen several times in the past with cervical and lumbar pathology. She has known cervical stenosis at C3-C4, C4-C5, and C6-C7. She has tried Lyrica and hydrocodone. She has also tried chiropractic care, pain medication, oral steroids. She had EMG and nerve conduction studies that failed to show peripheral compression neuropathy.   More recently, she had an increase in the numbness and spasming in her hands. She went to the emergency department. She was prescribed oral steroids and diclofenac. She really doesnt perceive relief. She continues to have progressive loss of sensation in her fingertips and spasming of her hands after just a little bit of work. 
  
On exam, she has a positive Jamals on the left side. She also has a positive inverted brachioradialis reflex. She has mild loss of intrinsic function on the left side greater than the right. She has subjective paresthesias in the fingertips diffusely through both hands. Spurlings is positive. She also has light touch sensory disturbance over the bilateral forearms. 
  
Review of her MRI shows central stenosis at C3-C4 and severe bilateral foraminal stenosis at C6-C7 with loss of disc height. 
  
Assessment and plan: She has early cervical myelopathy and has exhausted conservative efforts. She is a candidate for surgery. I would recommend a C3-C4 laminectomy and fusion for the central stenosis and a C6-C7 ACDF for the collapsed disc and severe bilateral foraminal stenosis. She is not yet ready to commit to such a surgery. For now, I have prescribed gabapentin. \" 
 
Past Medical History/Comorbidities: Ms. Ora Mejia  has a past medical history of Abnormal Papanicolaou smear of cervix, Anemia, Arthritis, Bowel trouble, Chronic pain, Depression, Elective , Fatigue, GERD (gastroesophageal reflux disease), Hypertension, Menopause, Miscarriage, Neck pain, Pain in breast, Pelvis fracture, right ([address] Psychiatric disorder, Reflux esophagitis, and Sinus congestion. Ms. Ora Mejia  has a past surgical history that includes us guided core breast biopsy; hx colonoscopy (2009); hx endoscopy; hx orthopaedic (Left); and hx cataract removal (2017). Social History/Living Environment:  
Home Environment: Private residence # Steps to Enter: 2 One/Two Story Residence: Two story, live on 1st floor Living Alone: No 
 Support Systems: Spouse/Significant Other/Partner Patient Expects to be Discharged to[de-identified] Private residence Current DME Used/Available at Home: Walker, rolling Tub or Shower Type: Tub/Shower combination Prior Level of Function/Work/Activity: 
Lives with ; independent without use of any DME. Does have cane and walker, doesn't use. Pt denies recent falls but reports she has been unsteady. Number of Personal Factors/Comorbidities that affect the Plan of Care: 1-2: MODERATE COMPLEXITY EXAMINATION:  
Most Recent Physical Functioning:  
Gross Assessment: 
AROM: Within functional limits Strength: Generally decreased, functional 
Coordination: Within functional limits Posture: 
Posture (WDL): Exceptions to [facility] Posture Assessment: Forward head, Rounded shoulders Balance: 
Sitting: Intact Standing: Impaired Standing - Static: Good Standing - Dynamic : Fair(+) Bed Mobility: 
Rolling: Supervision Supine to Sit: Supervision Scooting: Supervision Wheelchair Mobility: 
  
Transfers: 
Sit to Stand: Contact guard assistance Stand to Sit: Contact guard assistance Bed to Chair: Contact guard assistance Gait: 
  
Base of Support: Narrowed Speed/Erma: Slow;Delayed Step Length: Left shortened;Right shortened Gait Abnormalities: Trunk sway increased;Decreased step clearance; Path deviations Distance (ft): 450 Feet (ft) Assistive Device: (none) Ambulation - Level of Assistance: Contact guard assistance;Minimal assistance Interventions: Verbal cues; Safety awareness training Body Structures Involved: 1. Nerves 2. Joints 3. Muscles Body Functions Affected: 1. Sensory/Pain 2. Neuromusculoskeletal 
3. Movement Related 4. Skin Related Activities and Participation Affected: 1. General Tasks and Demands 2. Mobility 3. Domestic Life 4. Community, Social and [place] Number of elements that affect the Plan of Care: 4+: HIGH COMPLEXITY CLINICAL PRESENTATION:  
 Presentation: Stable and uncomplicated: LOW COMPLEXITY CLINICAL DECISION MAKING:  
[facility] AM-PAC 6 Clicks Basic Mobility Inpatient Short Form How much difficulty does the patient currently have. .. Unable A Lot A Little None 1. Turning over in bed (including adjusting bedclothes, sheets and blankets)? [] 1   [] 2   [] 3   [x] 4  
2. Sitting down on and standing up from a chair with arms ( e.g., wheelchair, bedside commode, etc.)   [] 1   [] 2   [x] 3   [] 4  
3. Moving from lying on back to sitting on the side of the bed? [] 1   [] 2   [] 3   [x] 4 How much help from another person does the patient currently need. .. Total A Lot A Little None 4. Moving to and from a bed to a chair (including a wheelchair)? [] 1   [] 2   [x] 3   [] 4  
5. Need to walk in hospital room? [] 1   [] 2   [x] 3   [] 4  
6. Climbing 3-5 steps with a railing? [] 1   [] 2   [x] 3   [] 4  
© 2007, Trustees of [facility], under license to [employer]. All rights reserved Score:  Initial: 20 Most Recent: X (Date: -- ) Interpretation of Tool:  Represents activities that are increasingly more difficult (i.e. Bed mobility, Transfers, Gait). Score 24 23 22-20 19-15 14-10 9-7 6 Modifier CH CI CJ CK CL CM CN   
 
? Mobility - Walking and Moving Around:  
  - CURRENT STATUS: CJ - 20%-39% impaired, limited or restricted  - GOAL STATUS: CI - 1%-19% impaired, limited or restricted  - D/C STATUS:  ---------------To be determined--------------- [place] / [place] / Product Type: Medicare /   
 
Medical Necessity:    
· Patient demonstrates good rehab potential due to higher previous functional level. Reason for Services/Other Comments: 
· Patient continues to demonstrate capacity to improve strength, balance, activity tolerance which will increase independence, decrease amount of assistance required from caregiver and increase safety. Use of outcome tool(s) and clinical judgement create a POC that gives a: Clear prediction of patient's progress: LOW COMPLEXITY  
  
 
 
 
TREATMENT:  
(In addition to Assessment/Re-Assessment sessions the following treatments were rendered) Pre-treatment Symptoms/Complaints:  \"thank you\" Pain: Initial:  
Pain Intensity 1: 3 Pain Intervention(s) 1: Repositioned, Ambulation/Increased Activity  Post Session:  0/10 Therapeutic Activity: (    15 Minutes): Therapeutic activities including Bed transfers, Chair transfers, Toilet transfers, Ambulation on level ground and standing activities in bathroom to improve mobility, strength, balance, coordination and activity tolerance. Required minimal Verbal cues; Safety awareness training to promote static and dynamic balance in standing and promote motor control of bilateral, lower extremity(s). Braces/Orthotics/Lines/Etc:  
· IV 
· drain hemovac · O2 Device: Room air Treatment/Session Assessment:   
· Response to Treatment:  Pt performs mobility with CGA · Interdisciplinary Collaboration:  
o Physical Therapist 
o Registered Nurse · After treatment position/precautions:  
o Up in chair 
o Bed/Chair-wheels locked 
o Bed in low position 
o Call light within reach 
o Family at bedside · Compliance with Program/Exercises: Will assess as treatment progresses. · Recommendations/Intent for next treatment session: \"Next visit will focus on advancements to more challenging activities and reduction in assistance provided\". Total Treatment Duration: PT Patient Time In/Time Out Time In: 0830 Time Out: 1420 Nickie Givens DPT

## 2018-10-18 NOTE — PROGRESS NOTES
Problem: Self Care Deficits Care Plan (Adult) Goal: *Acute Goals and Plan of Care (Insert Text) 1. Patient will verbalize and demonstrate understanding of spinal precautions with 100% accuracy during ADLs. 2. Patient will complete upper body bathing and dressing with supervision and adaptive equipment as needed. 3. Patient will complete functional transfers with supervision and adaptive equipment as needed. 4. Patient will complete toileting and toilet transfer with supervision. 5. Patient will complete functional mobility of household distances with supervision and adaptive equipment as needed. 6. Patient will demonstrate ability to log roll in bed with modified independence and no verbal cues from therapist.  
 
Timeframe: 7 visits OCCUPATIONAL THERAPY: Initial Assessment, Treatment Day: 1st and AM 10/18/2018INPATIENT: Hospital Day: 2 [place] / Product Type: Medicare /  
  
NAME/AGE/GENDER: Justin Bolden is a 70 y.o. female PRIMARY DIAGNOSIS:  Stenosis of cervical spine with myelopathy [M47.12] Stenosis of cervical spine with myelopathy Stenosis of cervical spine with myelopathy Procedure(s) (LRB): SPINE ANTERIOR CERVICAL DISCECTOMY WITH FUSION C6-7  SSEP NEURO MONITOR (N/A) SPINE POSTERIOR CERVICAL LAMINECTOMY, FUSION WITH ALLOGRAFT AND INSTRUMENTATION C3-4 (N/A) 1 Day Post-Op ICD-10: Treatment Diagnosis:  
 · Generalized Muscle Weakness (M62.81) · Other lack of cordination (R27.8) · History of falling (Z91.81) · Cervicalgia (M54.2) Precautions/Allergies: 
   Latex, natural rubber; Celebrex [celecoxib]; and Mirtazapine ASSESSMENT:  
Ms. Ganesh Maya was admitted with stenosis of cervical spine and is s/p the above procedure. Pt lives with her spouse in a two story home with a tub/shower and independent with ADL/functional mobility at baseline. Pt does admit to one recent fall.  This session, pt presented sitting up in chair. Pt c/o 9/10 pain currently in her neck and is very rigid in her movements. Pt reports hx of dropping items from her R hand. Sensation is grossly intact to light touch but decreased in the fingertips. Pt demonstrates some atrophy of the thenar muscles of R hand but strength appears functional with activity. Pt educated on precautions and how they apply to ADL. Pt completed standing from chair and functional mobility in the room/bathroom with CGA holding to IV pole. Pt stood sink side and was able to open toothbrush package and toothpaste. Pt  her teeth with cueing for upright posture. Pt demonstrated deficits in ROM, strength, dynamic balance, pain, and activity tolerance impacting ADLs. Pt transferred back to supine with good ability to complete log roll. At the end of the session, pt was left supine in bed with needs in reach. Pt presented below functional baseline and would benefit from skilled OT services to address deficits. This section established at most recent assessment PROBLEM LIST (Impairments causing functional limitations): 1. Decreased Strength 2. Decreased ADL/Functional Activities 3. Decreased Transfer Abilities 4. Decreased Ambulation Ability/Technique 5. Decreased Balance 6. Increased Pain 7. Decreased Activity Tolerance 8. Increased Fatigue 9. Decreased Flexibility/Joint Mobility 10. Edema/Girth 11. Decreased Knowledge of Precautions 12. Decreased [place] with Home Exercise Program 
 INTERVENTIONS PLANNED: (Benefits and precautions of occupational therapy have been discussed with the patient.) 1. Activities of daily living training 2. Adaptive equipment training 3. Balance training 4. Clothing management 5. Donning&doffing training 6. Neuromuscular re-eduation 7. Sensory reintegration training 8. Therapeutic activity 9. Therapeutic exercise TREATMENT PLAN: Frequency/Duration: Follow patient 3 times per week to address above goals. Rehabilitation Potential For Stated Goals: Excellent RECOMMENDED REHABILITATION/EQUIPMENT: (at time of discharge pending progress): Due to the probability of continued deficits (see above) this patient will not likely need continued skilled occupational therapy after discharge. (Potentially outpatient therapy in the future as needed for upper body function) Equipment: ? TBD\ OCCUPATIONAL PROFILE AND HISTORY:  
History of Present Injury/Illness (Reason for Referral): 
See H&P Past Medical History/Comorbidities: Ms. Gigi Trejo  has a past medical history of Abnormal Papanicolaou smear of cervix, Anemia, Arthritis, Bowel trouble, Chronic pain, Depression, Elective , Fatigue, GERD (gastroesophageal reflux disease), Hypertension, Menopause, Miscarriage, Neck pain, Pain in breast, Pelvis fracture, right ([address] Psychiatric disorder, Reflux esophagitis, and Sinus congestion. Ms. Gigi Trejo  has a past surgical history that includes us guided core breast biopsy; hx colonoscopy (2009); hx endoscopy; hx orthopaedic (Left); and hx cataract removal (2017). Social History/Living Environment:  
Home Environment: Private residence # Steps to Enter: 2 One/Two Story Residence: Two story, live on 1st floor Living Alone: No 
Support Systems: Spouse/Significant Other/Partner Patient Expects to be Discharged to[de-identified] Private residence Current DME Used/Available at Home: Walker, rolling Tub or Shower Type: Tub/Shower combination Prior Level of Function/Work/Activity: 
Pt lives with her spouse in a two story home with a tub/shower and independent with ADL/functional mobility at baseline. Pt does admit to one recent fall. Pt reports hx of dropping items from her R dominant hand due to decreased strength/sensation Personal Factors: Other factors that influence how disability is experienced by the patient:  Multiple co-morbidities Number of Personal Factors/Comorbidities that affect the Plan of Care: Expanded review of therapy/medical records (1-2):  MODERATE COMPLEXITY ASSESSMENT OF OCCUPATIONAL PERFORMANCE[de-identified]  
Activities of Daily Living:  
Basic ADLs (From Assessment) Complex ADLs (From Assessment) Feeding: Stand-by assistance Oral Facial Hygiene/Grooming: Stand-by assistance Bathing: Moderate assistance Upper Body Dressing: Moderate assistance Lower Body Dressing: Minimum assistance Toileting: Minimum assistance Instrumental ADL Meal Preparation: Moderate assistance Homemaking: Maximum assistance Grooming/Bathing/Dressing Activities of Daily Living Cognitive Retraining Safety/Judgement: Fall prevention;Home safety Functional Transfers Bathroom Mobility: Contact guard assistance Bed/Mat Mobility Rolling: Supervision Supine to Sit: Supervision Sit to Supine: Supervision Sit to Stand: Contact guard assistance Bed to Chair: Contact guard assistance Scooting: Supervision Most Recent Physical Functioning:  
Gross Assessment: 
AROM: Generally decreased, functional 
Strength: Generally decreased, functional 
Coordination: Generally decreased, functional 
Sensation: (grossly intact to light touch; but decreased in fingertips) Posture: 
Posture (WDL): Exceptions to [facility] Posture Assessment: Forward head, Rounded shoulders Balance: 
Sitting: Intact Standing: Impaired Standing - Static: Good Standing - Dynamic : Fair(+) Bed Mobility: 
Rolling: Supervision Supine to Sit: Supervision Sit to Supine: Supervision Scooting: Supervision Wheelchair Mobility: 
  
Transfers: 
Sit to Stand: Contact guard assistance Stand to Sit: Contact guard assistance Bed to Chair: Contact guard assistance Patient Vitals for the past 6 hrs: 
 BP SpO2 Pulse 10/18/18 0811 148/73 97 % 79 Mental Status Neurologic State: Alert Orientation Level: Oriented X4 
 Cognition: Appropriate decision making, Appropriate for age attention/concentration, Follows commands Perception: Appears intact Perseveration: No perseveration noted Safety/Judgement: Fall prevention, Home safety Physical Skills Involved: 1. Range of Motion 2. Balance 3. Strength 4. Activity Tolerance 5. Sensation 6. Pain (acute) Cognitive Skills Affected (resulting in the inability to perform in a timely and safe manner): 1. none Psychosocial Skills Affected: 1. Habits/Routines Number of elements that affect the Plan of Care: 5+:  HIGH COMPLEXITY CLINICAL DECISION MAKING:  
[facility] AM-PAC 6 Clicks Daily Activity Inpatient Short Form How much help from another person does the patient currently need. .. Total A Lot A Little None 1. Putting on and taking off regular lower body clothing? [] 1   [] 2   [x] 3   [] 4  
2. Bathing (including washing, rinsing, drying)? [] 1   [x] 2   [] 3   [] 4  
3. Toileting, which includes using toilet, bedpan or urinal?   [] 1   [] 2   [x] 3   [] 4  
4. Putting on and taking off regular upper body clothing? [] 1   [x] 2   [] 3   [] 4  
5. Taking care of personal grooming such as brushing teeth? [] 1   [] 2   [x] 3   [] 4  
6. Eating meals? [] 1   [] 2   [x] 3   [] 4  
© 2007, Trustees of [facility] [employer], under license to [employer]. All rights reserved Score:  Initial: 16 Most Recent: X (Date: -- ) Interpretation of Tool:  Represents activities that are increasingly more difficult (i.e. Bed mobility, Transfers, Gait). Score 24 23 22-20 19-15 14-10 9-7 6 Modifier CH CI CJ CK CL CM CN   
 
? Self Care:  
  - CURRENT STATUS: CK - 40%-59% impaired, limited or restricted  - GOAL STATUS: CJ - 20%-39% impaired, limited or restricted  - D/C STATUS:  ---------------To be determined--------------- [place] / Product Type: Medicare /   
 
 Medical Necessity:    
· Patient demonstrates excellent rehab potential due to higher previous functional level. Reason for Services/Other Comments: 
· Patient continues to require skilled intervention due to decreased independence with ADL/functional transfers. Use of outcome tool(s) and clinical judgement create a POC that gives a: MODERATE COMPLEXITY  
 
 
 
TREATMENT:  
(In addition to Assessment/Re-Assessment sessions the following treatments were rendered) Pre-treatment Symptoms/Complaints:   
Pain: Initial:  
Pain Intensity 1: 9 Pain Intervention(s) 1: Ambulation/Increased Activity, Repositioned  Post Session:  same Self Care: (8 minutes): Procedure(s) (per grid) utilized to improve and/or restore self-care/home management as related to grooming. Required minimal visual, verbal and   cueing to facilitate activities of daily living skills and compensatory activities. Braces/Orthotics/Lines/Etc:  
· IV 
· O2 Device: Room air Treatment/Session Assessment:   
· Response to Treatment:  Pt tolerated well with good participation. · Interdisciplinary Collaboration:  
o Occupational Therapist 
o Registered Nurse · After treatment position/precautions:  
o Supine in bed 
o Bed/Chair-wheels locked 
o Call light within reach 
o RN notified 
o Family at bedside · Compliance with Program/Exercises: Will assess as treatment progresses. · Recommendations/Intent for next treatment session: \"Next visit will focus on advancements to more challenging activities and reduction in assistance provided\". Total Treatment Duration: OT Patient Time In/Time Out Time In: 2507 Time Out: 0619 Elaine Pérez OT

## 2018-10-19 VITALS
HEART RATE: 72 BPM | HEIGHT: 63 IN | DIASTOLIC BLOOD PRESSURE: 66 MMHG | RESPIRATION RATE: 16 BRPM | TEMPERATURE: 98.2 F | SYSTOLIC BLOOD PRESSURE: 125 MMHG | OXYGEN SATURATION: 96 % | BODY MASS INDEX: 21.86 KG/M2 | WEIGHT: 123.4 LBS

## 2018-10-19 PROCEDURE — 74011250636 HC RX REV CODE- 250/636: Performed by: NURSE PRACTITIONER

## 2018-10-19 PROCEDURE — 74011250637 HC RX REV CODE- 250/637: Performed by: NURSE PRACTITIONER

## 2018-10-19 PROCEDURE — 97530 THERAPEUTIC ACTIVITIES: CPT

## 2018-10-19 PROCEDURE — 74011250637 HC RX REV CODE- 250/637: Performed by: ORTHOPAEDIC SURGERY

## 2018-10-19 PROCEDURE — 97110 THERAPEUTIC EXERCISES: CPT

## 2018-10-19 RX ADMIN — BUPROPION HYDROCHLORIDE 150 MG: 150 TABLET, EXTENDED RELEASE ORAL at 09:24

## 2018-10-19 RX ADMIN — DIAZEPAM 5 MG: 5 TABLET ORAL at 12:21

## 2018-10-19 RX ADMIN — AMLODIPINE BESYLATE 5 MG: 5 TABLET ORAL at 09:24

## 2018-10-19 RX ADMIN — VALSARTAN 160 MG: 320 TABLET, FILM COATED ORAL at 09:26

## 2018-10-19 RX ADMIN — Medication 1 AMPULE: at 09:24

## 2018-10-19 RX ADMIN — MEDROXYPROGESTERONE ACETATE 2.5 MG: 10 TABLET ORAL at 09:25

## 2018-10-19 RX ADMIN — ACETAMINOPHEN 650 MG: 325 TABLET, FILM COATED ORAL at 00:16

## 2018-10-19 RX ADMIN — OXYCODONE HYDROCHLORIDE 10 MG: 5 TABLET ORAL at 09:32

## 2018-10-19 RX ADMIN — ACETAMINOPHEN 650 MG: 325 TABLET, FILM COATED ORAL at 12:22

## 2018-10-19 RX ADMIN — DOCUSATE SODIUM 100 MG: 100 CAPSULE, LIQUID FILLED ORAL at 09:27

## 2018-10-19 RX ADMIN — Medication 10 ML: at 05:18

## 2018-10-19 RX ADMIN — PREGABALIN 100 MG: 50 CAPSULE ORAL at 09:24

## 2018-10-19 RX ADMIN — ACETAMINOPHEN 650 MG: 325 TABLET, FILM COATED ORAL at 06:00

## 2018-10-19 RX ADMIN — KETOROLAC TROMETHAMINE 15 MG: 15 INJECTION, SOLUTION INTRAMUSCULAR; INTRAVENOUS at 00:16

## 2018-10-19 RX ADMIN — Medication 10 ML: at 06:22

## 2018-10-19 RX ADMIN — ESTROGENS, CONJUGATED 1.25 MG: 0.62 TABLET, FILM COATED ORAL at 09:24

## 2018-10-19 RX ADMIN — Medication 10 ML: at 06:00

## 2018-10-19 NOTE — DISCHARGE INSTRUCTIONS
DISCHARGE SUMMARY from Nurse    PATIENT INSTRUCTIONS:    After general anesthesia or intravenous sedation, for 24 hours or while taking prescription Narcotics:  · Limit your activities  · Do not drive and operate hazardous machinery  · Do not make important personal or business decisions  · Do  not drink alcoholic beverages  · If you have not urinated within 8 hours after discharge, please contact your surgeon on call. Report the following to your surgeon:  · Excessive pain, swelling, redness or odor of or around the surgical area  · Temperature over 100.5  · Nausea and vomiting lasting longer than 4 hours or if unable to take medications  · Any signs of decreased circulation or nerve impairment to extremity: change in color, persistent  numbness, tingling, coldness or increase pain  · Any questions    What to do at Home:  Recommended activity:   Discharge instructions:     -Resume pre hospital diet            -Resume home medications per medical continuation form     -Follow up in office as scheduled   -Call doctor immediately if T>100.5, increased pain, swelling, drainage. -If shortness of breath or chest pain, immediately go to ER  -Post surgical instruction sheet given to patient          If you experience any of the following symptoms increased pain, fever greater than 101, nausea/vomiting, or increased wound drianage, please follow up with Dr. Lawanda Heimlich. *  Please give a list of your current medications to your Primary Care Provider. *  Please update this list whenever your medications are discontinued, doses are      changed, or new medications (including over-the-counter products) are added. *  Please carry medication information at all times in case of emergency situations.     These are general instructions for a healthy lifestyle:    No smoking/ No tobacco products/ Avoid exposure to second hand smoke  Surgeon General's Warning:  Quitting smoking now greatly reduces serious risk to your health. Obesity, smoking, and sedentary lifestyle greatly increases your risk for illness    A healthy diet, regular physical exercise & weight monitoring are important for maintaining a healthy lifestyle    You may be retaining fluid if you have a history of heart failure or if you experience any of the following symptoms:  Weight gain of 3 pounds or more overnight or 5 pounds in a week, increased swelling in our hands or feet or shortness of breath while lying flat in bed. Please call your doctor as soon as you notice any of these symptoms; do not wait until your next office visit. Recognize signs and symptoms of STROKE:    F-face looks uneven    A-arms unable to move or move unevenly    S-speech slurred or non-existent    T-time-call 911 as soon as signs and symptoms begin-DO NOT go       Back to bed or wait to see if you get better-TIME IS BRAIN. Warning Signs of HEART ATTACK     Call 911 if you have these symptoms:   Chest discomfort. Most heart attacks involve discomfort in the center of the chest that lasts more than a few minutes, or that goes away and comes back. It can feel like uncomfortable pressure, squeezing, fullness, or pain.  Discomfort in other areas of the upper body. Symptoms can include pain or discomfort in one or both arms, the back, neck, jaw, or stomach.  Shortness of breath with or without chest discomfort.  Other signs may include breaking out in a cold sweat, nausea, or lightheadedness. Don't wait more than five minutes to call 911 - MINUTES MATTER! Fast action can save your life. Calling 911 is almost always the fastest way to get lifesaving treatment. Emergency Medical Services staff can begin treatment when they arrive -- up to an hour sooner than if someone gets to the hospital by car. The discharge information has been reviewed with the patient. The patient verbalized understanding.   Discharge medications reviewed with the patient and appropriate educational materials and side effects teaching were provided.   ___________________________________________________________________________________________________________________________________

## 2018-10-19 NOTE — PROGRESS NOTES
ORTHO PROGRESS NOTE 2018 Admit Date: 10/17/2018 Admit Diagnosis: Stenosis of cervical spine with myelopathy [M47.12] Post Op day: 2 Days Post-Op Subjective:  
 
Isela Adams is a patient who is now 2 Days Post-Op  and has no complaints. Objective:  
 
PT/OT: independent Vital Signs:   
Patient Vitals for the past 8 hrs: 
 BP Temp Pulse Resp SpO2  
10/19/18 0703 125/66 98.2 °F (36.8 °C) 72 16 96 % 10/19/18 0422 123/58 98.4 °F (36.9 °C) 70 12 98 % Temp (24hrs), Av.2 °F (36.8 °C), Min:98 °F (36.7 °C), Max:98.4 °F (36.9 °C) LAB:   
No results for input(s): HGB, WBC, PLT, HGBEXT, PLTEXT in the last 72 hours. I/O: 
No intake/output data recorded. 10/17 1901 - 10/19 0700 In: 2299 [P.O.:600; I.V.:1699] Out: 110 [Drains:110] Physical Exam: 
 
Awake and in no acute distress. Mood and affect appropriate. Respirations unlabored and no evidence cyanosis. Calves nontender. Abdomen soft and nontender. Dressing clean/dry No new neurologic deficit. Assessment:  
  
Patient Active Problem List  
Diagnosis Code  Hypertension I10  Fatigue R53.83  Reflux esophagitis K21.0  Neck pain M54.2  Sinus congestion R09.81  
 Microhematuria R31.29  
 B12 deficiency E53.8  Renal cyst, congenital, right Q61.00  Depression with anxiety F41.8  Hx of fracture of pelvis Z87.81  Back pain M54.9  Post-viral cough syndrome R05  Chronic narcotic use F11.90  
 Other chronic pain G89.29  Spinal stenosis of lumbar region with neurogenic claudication M48.062  Radiculopathy affecting upper extremity M54.10  Stenosis of cervical spine with myelopathy M47.12 2 Days Post-Op STATUS POST Procedure(s): SPINE ANTERIOR CERVICAL DISCECTOMY WITH FUSION C6-7  SSEP NEURO MONITOR SPINE POSTERIOR CERVICAL LAMINECTOMY, FUSION WITH ALLOGRAFT AND INSTRUMENTATION C3-4 Plan:  
 
Continue PT/OT/Rehab Discontinue: IV 
Consult: none Anticipate discharge to: HOME this am  
   
Signed By: Claudia Hsu NP

## 2018-10-19 NOTE — PROGRESS NOTES
Discharge instructions and prescriptions given and reviewed with pt, verbalizes understanding, pt states she was suppose to have a muscle relaxer at discharge, will speak with NP, waiting on ride.

## 2018-10-19 NOTE — PROGRESS NOTES
Rounded every hour and prn. Ambulated with therapy. Ortho PA updated on pt's pain level. New orders were received, including hemovac drain d/c. Drain was d/c'd. Tolerated well, occlusive dressing applied. Patient reports increased relief from pain with toradol.

## 2018-10-19 NOTE — PROGRESS NOTES
Hourly rounds call light in reach non skid socks. Pt resting in bed but complaining of neck stiffness tylenol was given but pt stated that she did not want a narcotic last night

## 2018-10-19 NOTE — ROUTINE PROCESS
Rounded every hour and prn. Ambulated in the halls with therapy. Neck dressings in place. No apparent drainage/bleeding. Requested prn pain med closer to d/c. Not driving.

## 2018-10-19 NOTE — PROGRESS NOTES
Problem: Mobility Impaired (Adult and Pediatric) Goal: *Acute Goals and Plan of Care (Insert Text) LTG: 
(1.)Ms. Irma De Jesus will move from supine to sit and sit to supine, scoot up and down and roll side to side INDEPENDENTLY within 7 treatment day(s). (2.)Ms. Irma De Jesus will transfer from bed to chair and chair to bed INDEPENDENTLY within 7 treatment day(s). (3.)Ms. Irma De Jesus will ambulate INDEPENDENTLY for 500+ feet with the least restrictive device within 7 treatment day(s). ________________________________________________________________________________________________ PHYSICAL THERAPY: Daily Note, Treatment Day: 1st, AM 10/19/2018INPATIENT: Hospital Day: 3 [place] / Product Type: Medicare /  
  
NAME/AGE/GENDER: Kemal Griffin is a 70 y.o. female PRIMARY DIAGNOSIS: Stenosis of cervical spine with myelopathy [M47.12] Stenosis of cervical spine with myelopathy Stenosis of cervical spine with myelopathy Procedure(s) (LRB): SPINE ANTERIOR CERVICAL DISCECTOMY WITH FUSION C6-7  SSEP NEURO MONITOR (N/A) SPINE POSTERIOR CERVICAL LAMINECTOMY, FUSION WITH ALLOGRAFT AND INSTRUMENTATION C3-4 (N/A) 2 Days Post-Op ICD-10: Treatment Diagnosis:  
 · Generalized Muscle Weakness (M62.81) · Difficulty in walking, Not elsewhere classified (R26.2) · Other abnormalities of gait and mobility (R26.89) Precaution/Allergies: 
Latex, natural rubber; Celebrex [celecoxib]; and Mirtazapine ASSESSMENT:  
Ms. Irma De Jesus presents in supine . Agreeable to PT treatment. Reviewed log roll with pt who transfers to sitting with supervision. She performed ex as listed below. Ambulates 450 ft again with CGA-SBA and verbal cues for gait safety, technique. Returned to room and then to recliner. Positioned comfortably with needs in reach.  Performs similarly to this morning with mobility, reports pain continues to limit her and tired from lack of sleep. Will continue with therapy efforts to address strength, activity tolerance. Anticipate return home [facility] [place] [facility] PT. This section established at most recent assessment PROBLEM LIST (Impairments causing functional limitations): 1. Decreased Strength 2. Decreased Transfer Abilities 3. Decreased Ambulation Ability/Technique 4. Decreased Balance 5. Increased Pain 6. Decreased Activity Tolerance INTERVENTIONS PLANNED: (Benefits and precautions of physical therapy have been discussed with the patient.) 1. Balance Exercise 2. Bed Mobility 3. Gait Training 4. Therapeutic Activites 5. Therapeutic Exercise/Strengthening 6. Transfer Training TREATMENT PLAN: Frequency/Duration: twice daily for duration of hospital stayRehabilitation Potential For Stated Goals: Good RECOMMENDED REHABILITATION/EQUIPMENT: (at time of discharge pending progress): Due to the probability of continued deficits (see above) this patient will likely need continued skilled physical therapy after discharge. Equipment:  
? None at this time HISTORY:  
History of Present Injury/Illness (Reason for Referral): 
Per H&P, \"This is a 75-year-old female who I had seen several times in the past with cervical and lumbar pathology. She has known cervical stenosis at C3-C4, C4-C5, and C6-C7. She has tried Lyrica and hydrocodone. She has also tried chiropractic care, pain medication, oral steroids. She had EMG and nerve conduction studies that failed to show peripheral compression neuropathy. More recently, she had an increase in the numbness and spasming in her hands. She went to the emergency department. She was prescribed oral steroids and diclofenac. She really doesnt perceive relief. She continues to have progressive loss of sensation in her fingertips and spasming of her hands after just a little bit of work. 
  
On exam, she has a positive Jamals on the left side.   She also has a positive inverted brachioradialis reflex. She has mild loss of intrinsic function on the left side greater than the right. She has subjective paresthesias in the fingertips diffusely through both hands. Spurlings is positive. She also has light touch sensory disturbance over the bilateral forearms. 
  
Review of her MRI shows central stenosis at C3-C4 and severe bilateral foraminal stenosis at C6-C7 with loss of disc height. 
  
Assessment and plan: She has early cervical myelopathy and has exhausted conservative efforts. She is a candidate for surgery. I would recommend a C3-C4 laminectomy and fusion for the central stenosis and a C6-C7 ACDF for the collapsed disc and severe bilateral foraminal stenosis. She is not yet ready to commit to such a surgery. For now, I have prescribed gabapentin. \" 
 
Past Medical History/Comorbidities: Ms. Devin Ruiz  has a past medical history of Abnormal Papanicolaou smear of cervix, Anemia, Arthritis, Bowel trouble, Chronic pain, Depression, Elective , Fatigue, GERD (gastroesophageal reflux disease), Hypertension, Menopause, Miscarriage, Neck pain, Pain in breast, Pelvis fracture, right ([address] Psychiatric disorder, Reflux esophagitis, and Sinus congestion. Ms. Devin Ruiz  has a past surgical history that includes us guided core breast biopsy; hx colonoscopy (2009); hx endoscopy; hx orthopaedic (Left); hx cataract removal (2017); SPINE ANTERIOR CERVICAL DISCECTOMY WITH FUSION C6-7  SSEP NEURO MONITOR (N/A, 10/17/2018); and SPINE POSTERIOR CERVICAL LAMINECTOMY, FUSION WITH ALLOGRAFT AND INSTRUMENTATION C3-4 (N/A, 10/17/2018). Social History/Living Environment:  
Home Environment: Private residence # Steps to Enter: 2 One/Two Story Residence: Two story, live on 1st floor Living Alone: No 
Support Systems: Spouse/Significant Other/Partner Patient Expects to be Discharged to[de-identified] Private residence Current DME Used/Available at Home: Walker, rolling Tub or Shower Type: Tub/Shower combination Prior Level of Function/Work/Activity: 
Lives with ; independent without use of any DME. Does have cane and walker, doesn't use. Pt denies recent falls but reports she has been unsteady. Number of Personal Factors/Comorbidities that affect the Plan of Care: 1-2: MODERATE COMPLEXITY EXAMINATION:  
Most Recent Physical Functioning:  
Gross Assessment: 
  
         
  
Posture: 
Posture (WDL): Exceptions to [facility] Posture Assessment: Forward head Balance: 
Sitting: Intact Standing: Impaired Standing - Static: Good Standing - Dynamic : Fair(+) Bed Mobility: 
Supine to Sit: Supervision Wheelchair Mobility: 
  
Transfers: 
  
Gait: 
  
Base of Support: Narrowed Speed/Erma: Shuffled; Slow Step Length: Left shortened;Right shortened Gait Abnormalities: Altered arm swing Distance (ft): 450 Feet (ft) Assistive Device: (none) Ambulation - Level of Assistance: Contact guard assistance Interventions: Safety awareness training;Verbal cues Body Structures Involved: 1. Nerves 2. Joints 3. Muscles Body Functions Affected: 1. Sensory/Pain 2. Neuromusculoskeletal 
3. Movement Related 4. Skin Related Activities and Participation Affected: 1. General Tasks and Demands 2. Mobility 3. Domestic Life 4. Community, Social and [place] Number of elements that affect the Plan of Care: 4+: HIGH COMPLEXITY CLINICAL PRESENTATION:  
Presentation: Stable and uncomplicated: LOW COMPLEXITY CLINICAL DECISION MAKING:  
[employer] AM-PAC 6 Clicks Basic Mobility Inpatient Short Form How much difficulty does the patient currently have. .. Unable A Lot A Little None 1. Turning over in bed (including adjusting bedclothes, sheets and blankets)? [] 1   [] 2   [] 3   [x] 4  
2.   Sitting down on and standing up from a chair with arms ( e.g., wheelchair, bedside commode, etc.)   [] 1   [] 2   [x] 3   [] 4  
 3. Moving from lying on back to sitting on the side of the bed? [] 1   [] 2   [] 3   [x] 4 How much help from another person does the patient currently need. .. Total A Lot A Little None 4. Moving to and from a bed to a chair (including a wheelchair)? [] 1   [] 2   [x] 3   [] 4  
5. Need to walk in hospital room? [] 1   [] 2   [x] 3   [] 4  
6. Climbing 3-5 steps with a railing? [] 1   [] 2   [x] 3   [] 4  
© 2007, Trustees of [facility] [employer], under license to [employer]. All rights reserved Score:  Initial: 20 Most Recent: X (Date: -- ) Interpretation of Tool:  Represents activities that are increasingly more difficult (i.e. Bed mobility, Transfers, Gait). Score 24 23 22-20 19-15 14-10 9-7 6 Modifier CH CI CJ CK CL CM CN   
 
? Mobility - Walking and Moving Around:  
  - CURRENT STATUS: CJ - 20%-39% impaired, limited or restricted  - GOAL STATUS: CI - 1%-19% impaired, limited or restricted  - D/C STATUS:  ---------------To be determined--------------- [place] / [place] B / Product Type: Medicare /   
 
Medical Necessity:    
· Patient demonstrates good rehab potential due to higher previous functional level. Reason for Services/Other Comments: 
· Patient continues to demonstrate capacity to improve strength, balance, activity tolerance which will increase independence, decrease amount of assistance required from caregiver and increase safety. Use of outcome tool(s) and clinical judgement create a POC that gives a: Clear prediction of patient's progress: LOW COMPLEXITY  
  
 
 
 
TREATMENT:  
(In addition to Assessment/Re-Assessment sessions the following treatments were rendered) Pre-treatment Symptoms/Complaints:  \"I'll try, I don't know how much I feel like doing\" Pain: Initial:  
   Post Session:  0/10 Therapeutic Activity: (    13 min ):  Therapeutic activities including Bed mobility, Chair transfers,  Ambulation on level ground to improve mobility, strength, balance, coordination and activity tolerance. Required minimal Safety awareness training;Verbal cues to promote static and dynamic balance in standing and promote motor control of bilateral, lower extremity(s). Therapeutic Exercise: (  10 min):  Exercises per grid below to improve mobility, strength and balance. Required  visual and verbal cues to promote proper body alignment, promote proper body posture and promote proper body mechanics. Date: 
10/19/18 Date: 
 Date: Activity/Exercise Seated Parameters Parameters Parameters Heel raises X 15 B Toe raises X 15 B    
LAQ's X 15 B Hip Flex X 15 B Hip ABD X 15 B Braces/Orthotics/Lines/Etc:  
· O2 Device: Room air Treatment/Session Assessment:   
· Response to Treatment:  Unchanged; Pt performs mobility with CGA · Interdisciplinary Collaboration:  
o Physical Therapy Assistant 
o Registered Nurse · After treatment position/precautions:  
o Up in chair 
o Bed/Chair-wheels locked 
o Bed in low position 
o Call light within reach 
o Family at bedside · Compliance with Program/Exercises: Will assess as treatment progresses. · Recommendations/Intent for next treatment session: \"Next visit will focus on advancements to more challenging activities and reduction in assistance provided\". Total Treatment Duration: PT Patient Time In/Time Out Time In: 3927 Time Out: 2823 Shonna Goldman, PTA

## 2018-10-19 NOTE — DISCHARGE SUMMARY
Discharge Summary    Patient Darnell Valdes  [id number]  1955  70 y.o. Admit date: 10/17/2018    Discharge date:10/19/2018    Admitting Physician: Efraín Alarcon, *    Discharge Physician: Efraín Alarcon, *    Admission Diagnoses: spinal stenosis, spondylolisthesis    Discharge Diagnoses:  Patient Active Problem List   Diagnosis Code    Hypertension I10    Fatigue R53.83    Reflux esophagitis K21.0    Neck pain M54.2    Sinus congestion R09.81    Microhematuria R31.29    B12 deficiency E53.8    Renal cyst, congenital, right Q61.00    Depression with anxiety F41.8    Hx of fracture of pelvis Z87.81    Back pain M54.9    Post-viral cough syndrome R05    Chronic narcotic use F11.90    Other chronic pain G89.29    Spinal stenosis of lumbar region with neurogenic claudication M48.062    Radiculopathy affecting upper extremity M54.10    Stenosis of cervical spine with myelopathy M47.12          Surgeon: Efraín Alarcon, *    Procedure:   . Anterior cervical discectomy and fusion C6 - C7  2. Biomechanical device C6 - C7  3. Anterior cervical instrumentation C6-C7.  4., Posterior cervical arthrodesis C3 - C4.  5. Lateral mass instrumentation C3 - C4  6. Posterior cervical laminectomyC3 - C4 . Post Op complications: none    HBG at Discharge: No results for input(s): HGB, HGBEXT in the last 72 hours. Indications for admission/procedure: The patient was felt to have evidence of cervical myelopathy by history and physical exam. A cervical imaging study confirmed the presence of multilevel stenosis. Conservative efforts have been exhausted. In the outpatient setting the risks, benefits, and potential complications of the above listed procedure were discussed with him in detail and an informed consent was obtained. Hospital Course: Patient admitted to ortho floor. Antibiotics were given postop. SCD and karina hose were in place for DVT prophylaxis.  Anthony catheter was discontinued on POD # 1. Patient voided normally. Patient did not receive blood transfusion. Patient tolerated pain medications and po diet. Hemovac drain was removed on POD # 2. Dressing remained clean, dry, and intact. Physical Therapy started on the day following surgery and progressed to independent ambulation. Patient remained neurologically stable throughout hospital course. Reports improvement of preoperative pain. At the time of discharge, had understanding of precautions needed following surgery. Discharged to: home    Condition: Stable    New Medications: Norco 7.5      Follow up: 2 weeks    Discharge instructions:    -Resume pre hospital diet            -Resume home medications per medical continuation form     -Follow up in office as scheduled   -Call doctor immediately if T>100.5, increased pain, swelling, drainage.   -If shortness of breath or chest pain, immediately go to ER  -Post surgical instruction sheet given to patient    Signed:  Hillary Pérez NP  10/19/2018

## 2018-10-21 ENCOUNTER — HOME CARE VISIT (OUTPATIENT)
Dept: SCHEDULING | Facility: HOME HEALTH | Age: 63
End: 2018-10-21
Payer: MEDICARE

## 2018-10-21 PROCEDURE — G0151 HHCP-SERV OF PT,EA 15 MIN: HCPCS

## 2018-10-21 PROCEDURE — 400013 HH SOC

## 2018-10-21 PROCEDURE — 3331090002 HH PPS REVENUE DEBIT

## 2018-10-21 PROCEDURE — 3331090001 HH PPS REVENUE CREDIT

## 2018-10-22 VITALS
RESPIRATION RATE: 18 BRPM | DIASTOLIC BLOOD PRESSURE: 72 MMHG | SYSTOLIC BLOOD PRESSURE: 118 MMHG | TEMPERATURE: 97.9 F | HEART RATE: 87 BPM

## 2018-10-22 PROCEDURE — 3331090002 HH PPS REVENUE DEBIT

## 2018-10-22 PROCEDURE — 3331090001 HH PPS REVENUE CREDIT

## 2018-10-23 ENCOUNTER — HOME CARE VISIT (OUTPATIENT)
Dept: SCHEDULING | Facility: HOME HEALTH | Age: 63
End: 2018-10-23
Payer: MEDICARE

## 2018-10-23 VITALS
RESPIRATION RATE: 20 BRPM | HEART RATE: 75 BPM | SYSTOLIC BLOOD PRESSURE: 120 MMHG | TEMPERATURE: 97.2 F | DIASTOLIC BLOOD PRESSURE: 64 MMHG

## 2018-10-23 PROCEDURE — G0157 HHC PT ASSISTANT EA 15: HCPCS

## 2018-10-23 PROCEDURE — 3331090002 HH PPS REVENUE DEBIT

## 2018-10-23 PROCEDURE — 3331090001 HH PPS REVENUE CREDIT

## 2018-10-24 PROCEDURE — 3331090002 HH PPS REVENUE DEBIT

## 2018-10-24 PROCEDURE — 3331090001 HH PPS REVENUE CREDIT

## 2018-10-25 ENCOUNTER — HOME CARE VISIT (OUTPATIENT)
Dept: SCHEDULING | Facility: HOME HEALTH | Age: 63
End: 2018-10-25
Payer: MEDICARE

## 2018-10-25 VITALS
HEART RATE: 92 BPM | SYSTOLIC BLOOD PRESSURE: 122 MMHG | RESPIRATION RATE: 21 BRPM | TEMPERATURE: 97.2 F | DIASTOLIC BLOOD PRESSURE: 62 MMHG

## 2018-10-25 PROCEDURE — 3331090002 HH PPS REVENUE DEBIT

## 2018-10-25 PROCEDURE — 3331090001 HH PPS REVENUE CREDIT

## 2018-10-25 PROCEDURE — G0157 HHC PT ASSISTANT EA 15: HCPCS

## 2018-10-26 PROCEDURE — 3331090002 HH PPS REVENUE DEBIT

## 2018-10-26 PROCEDURE — 3331090001 HH PPS REVENUE CREDIT

## 2018-10-27 PROCEDURE — 3331090001 HH PPS REVENUE CREDIT

## 2018-10-27 PROCEDURE — 3331090002 HH PPS REVENUE DEBIT

## 2018-10-28 PROCEDURE — 3331090002 HH PPS REVENUE DEBIT

## 2018-10-28 PROCEDURE — 3331090001 HH PPS REVENUE CREDIT

## 2018-10-29 PROCEDURE — 3331090001 HH PPS REVENUE CREDIT

## 2018-10-29 PROCEDURE — 3331090002 HH PPS REVENUE DEBIT

## 2018-10-30 PROCEDURE — 3331090001 HH PPS REVENUE CREDIT

## 2018-10-30 PROCEDURE — 3331090002 HH PPS REVENUE DEBIT

## 2018-10-31 ENCOUNTER — HOME CARE VISIT (OUTPATIENT)
Dept: SCHEDULING | Facility: HOME HEALTH | Age: 63
End: 2018-10-31
Payer: MEDICARE

## 2018-10-31 PROCEDURE — 3331090001 HH PPS REVENUE CREDIT

## 2018-10-31 PROCEDURE — 3331090002 HH PPS REVENUE DEBIT

## 2018-11-01 ENCOUNTER — HOME CARE VISIT (OUTPATIENT)
Dept: SCHEDULING | Facility: HOME HEALTH | Age: 63
End: 2018-11-01
Payer: MEDICARE

## 2018-11-01 PROCEDURE — 3331090002 HH PPS REVENUE DEBIT

## 2018-11-01 PROCEDURE — 3331090001 HH PPS REVENUE CREDIT

## 2018-11-02 PROCEDURE — 3331090002 HH PPS REVENUE DEBIT

## 2018-11-02 PROCEDURE — 3331090001 HH PPS REVENUE CREDIT

## 2018-11-03 PROCEDURE — 3331090002 HH PPS REVENUE DEBIT

## 2018-11-03 PROCEDURE — 3331090001 HH PPS REVENUE CREDIT

## 2018-11-04 PROCEDURE — 3331090002 HH PPS REVENUE DEBIT

## 2018-11-04 PROCEDURE — 3331090001 HH PPS REVENUE CREDIT

## 2018-11-05 PROCEDURE — 3331090001 HH PPS REVENUE CREDIT

## 2018-11-05 PROCEDURE — 3331090002 HH PPS REVENUE DEBIT

## 2018-11-06 PROCEDURE — 3331090001 HH PPS REVENUE CREDIT

## 2018-11-06 PROCEDURE — 3331090002 HH PPS REVENUE DEBIT

## 2018-11-07 PROCEDURE — 3331090001 HH PPS REVENUE CREDIT

## 2018-11-07 PROCEDURE — 3331090002 HH PPS REVENUE DEBIT

## 2018-11-08 PROCEDURE — 3331090002 HH PPS REVENUE DEBIT

## 2018-11-08 PROCEDURE — 3331090001 HH PPS REVENUE CREDIT

## 2018-11-09 ENCOUNTER — HOME CARE VISIT (OUTPATIENT)
Dept: SCHEDULING | Facility: HOME HEALTH | Age: 63
End: 2018-11-09
Payer: MEDICARE

## 2018-11-09 PROCEDURE — 3331090002 HH PPS REVENUE DEBIT

## 2018-11-09 PROCEDURE — 3331090003 HH PPS REVENUE ADJ

## 2018-11-09 PROCEDURE — 3331090001 HH PPS REVENUE CREDIT

## 2018-11-10 PROCEDURE — 3331090002 HH PPS REVENUE DEBIT

## 2018-11-10 PROCEDURE — 3331090001 HH PPS REVENUE CREDIT

## 2018-11-11 PROCEDURE — 3331090001 HH PPS REVENUE CREDIT

## 2018-11-11 PROCEDURE — 3331090002 HH PPS REVENUE DEBIT

## 2018-12-12 RX ORDER — CEFAZOLIN SODIUM/WATER 2 G/20 ML
2 SYRINGE (ML) INTRAVENOUS ONCE
Status: CANCELLED | OUTPATIENT
Start: 2018-12-12 | End: 2018-12-12

## 2018-12-21 NOTE — H&P
Allergies:NKDA  Medications: AmLODIPine Besylate (5 MG);BuPROPion HCl ER (XL) (300 MG); BusPIRone HCl (7.5 MG); Cyclobenzaprine HCl (10 MG, Take 1 tablet(s) by mouth every 6-8 hours as needed [PRN]);Gabapentin (100 MG, Take 1 unit(s) by mouth 3 times a day as needed [PRN]);Hydrocodone-Acetaminophen (7.5-325 MG); Lyrica (100 MG, Take 1-2 unit(s) by mouth 2 times a day as needed [PRN]);Meloxicam (15 MG);Pantoprazole Sodium (40 MG); PredniSONE (10 MG (48), USE AS DIRECTED); Premarin (1.25 MG);Valsartan;Vitamin E-200;Zolpidem Tartrate (10 MG)    CC: LOW BACK AND LEG PAIN    HPI:  Recheck. The patient is 70years-old. She had an old MRI scan that showed problems in her back with stenosis and also spondylolisthesis. It was over a year old so we got a new one and I am reviewing it with her. She has pretty significant spinal stenosis at L3-4 and L4-5 and spondylolisthesis L4-5 and a severely degenerated L3-4 disc. Up above, it looks pretty good. She has a little disc bulging at L5-S1, but I do not think that is of significance. She has claudication symptoms, back and leg pain. It hurts when she stands and walks; gets better sitting. She has failed injections that used to work for her. I went through her medical history and really she is pretty healthy with no significant medical problems. She is not on blood thinners. PE:  On exam, she is a normal appearing female with no gross deformities. Mental status is alert and oriented x3. Normal affect. Pupils are equal, round and reactive to light. Oropharynx is clear. Neck is without adenopathy or bruit. Her lungs were clear to auscultation bilaterally. Heart is regular rate and rhythm without murmur. Abdomen is soft and nontender. No hepatosplenomegaly. ASSESSMENT/PLAN:  I told her the only thing I could do at this point was surgery and, because she has a stenosis and a spondylolisthesis,  she would require an L3 to L5 laminectomy and fusion.   I also discussed doing a TLIF with an interbody cage placement. I went through the hospital stay, recovery, wearing a brace. We went through the risks, including death, paralysis, infection, bleeding, transfusion, heart attack, stroke, clot in the leg, clot in the lung, dural tear, failure of fusion, failure of instrumentation and the fact I cannot guarantee pain relief. I answered all of her questions. She wants to proceed, but she wants to wait until after [place].         Electronically Signed By Yoli Benjamin MD

## 2018-12-31 ENCOUNTER — HOSPITAL ENCOUNTER (OUTPATIENT)
Dept: SURGERY | Age: 63
Discharge: HOME | End: 2018-12-31
Payer: MEDICARE

## 2018-12-31 VITALS
WEIGHT: 127.5 LBS | BODY MASS INDEX: 25.03 KG/M2 | RESPIRATION RATE: 17 BRPM | DIASTOLIC BLOOD PRESSURE: 72 MMHG | OXYGEN SATURATION: 99 % | HEIGHT: 60 IN | SYSTOLIC BLOOD PRESSURE: 142 MMHG

## 2018-12-31 LAB
ANION GAP SERPL CALC-SCNC: 5 MMOL/L (ref 7–16)
APPEARANCE UR: CLEAR
BACTERIA SPEC CULT: NORMAL
BACTERIA URNS QL MICRO: 0 /HPF
BASOPHILS # BLD: 0 K/UL (ref 0–0.2)
BASOPHILS NFR BLD: 0 % (ref 0–2)
BILIRUB UR QL: NEGATIVE
BUN SERPL-MCNC: 14 MG/DL (ref 8–23)
CALCIUM SERPL-MCNC: 8.9 MG/DL (ref 8.3–10.4)
CASTS URNS QL MICRO: 0 /LPF
CHLORIDE SERPL-SCNC: 104 MMOL/L (ref 98–107)
CO2 SERPL-SCNC: 28 MMOL/L (ref 21–32)
COLOR UR: YELLOW
CREAT SERPL-MCNC: 0.7 MG/DL (ref 0.6–1)
DIFFERENTIAL METHOD BLD: ABNORMAL
EOSINOPHIL # BLD: 0.2 K/UL (ref 0–0.8)
EOSINOPHIL NFR BLD: 3 % (ref 0.5–7.8)
EPI CELLS #/AREA URNS HPF: 0 /HPF
ERYTHROCYTE [DISTWIDTH] IN BLOOD BY AUTOMATED COUNT: 14.6 % (ref 11.9–14.6)
GLUCOSE SERPL-MCNC: 93 MG/DL (ref 65–100)
GLUCOSE UR STRIP.AUTO-MCNC: NEGATIVE MG/DL
HCT VFR BLD AUTO: 35.6 % (ref 35.8–46.3)
HGB BLD-MCNC: 11.2 G/DL (ref 11.7–15.4)
HGB UR QL STRIP: ABNORMAL
IMM GRANULOCYTES # BLD: 0 K/UL (ref 0–0.5)
IMM GRANULOCYTES NFR BLD AUTO: 0 % (ref 0–5)
KETONES UR QL STRIP.AUTO: NEGATIVE MG/DL
LEUKOCYTE ESTERASE UR QL STRIP.AUTO: NEGATIVE
LYMPHOCYTES # BLD: 2.2 K/UL (ref 0.5–4.6)
LYMPHOCYTES NFR BLD: 27 % (ref 13–44)
MCH RBC QN AUTO: 30.6 PG (ref 26.1–32.9)
MCHC RBC AUTO-ENTMCNC: 31.5 G/DL (ref 31.4–35)
MCV RBC AUTO: 97.3 FL (ref 79.6–97.8)
MONOCYTES # BLD: 0.6 K/UL (ref 0.1–1.3)
MONOCYTES NFR BLD: 7 % (ref 4–12)
NEUTS SEG # BLD: 5 K/UL (ref 1.7–8.2)
NEUTS SEG NFR BLD: 62 % (ref 43–78)
NITRITE UR QL STRIP.AUTO: NEGATIVE
NRBC # BLD: 0 K/UL (ref 0–0.2)
PH UR STRIP: 7.5 [PH] (ref 5–9)
PLATELET # BLD AUTO: 334 K/UL (ref 150–450)
PMV BLD AUTO: 9.8 FL (ref 9.4–12.3)
POTASSIUM SERPL-SCNC: 5 MMOL/L (ref 3.5–5.1)
PROT UR STRIP-MCNC: NEGATIVE MG/DL
RBC # BLD AUTO: 3.66 M/UL (ref 4.05–5.2)
RBC #/AREA URNS HPF: ABNORMAL /HPF
SERVICE CMNT-IMP: NORMAL
SODIUM SERPL-SCNC: 137 MMOL/L (ref 136–145)
SP GR UR REFRACTOMETRY: 1.01 (ref 1–1.02)
UROBILINOGEN UR QL STRIP.AUTO: 0.2 EU/DL (ref 0.2–1)
WBC # BLD AUTO: 7.9 K/UL (ref 4.3–11.1)
WBC URNS QL MICRO: 0 /HPF

## 2018-12-31 PROCEDURE — 87641 MR-STAPH DNA AMP PROBE: CPT

## 2018-12-31 PROCEDURE — 81001 URINALYSIS AUTO W/SCOPE: CPT

## 2018-12-31 PROCEDURE — 85025 COMPLETE CBC W/AUTO DIFF WBC: CPT

## 2018-12-31 PROCEDURE — 77030027138 HC INCENT SPIROMETER -A

## 2018-12-31 PROCEDURE — 80048 BASIC METABOLIC PNL TOTAL CA: CPT

## 2018-12-31 RX ORDER — DIPHENHYDRAMINE HCL 25 MG
25 TABLET ORAL 2 TIMES DAILY
COMMUNITY
End: 2019-08-13

## 2018-12-31 RX ORDER — MEDROXYPROGESTERONE ACETATE 2.5 MG/1
2.5 TABLET ORAL DAILY
COMMUNITY
End: 2019-11-20 | Stop reason: SDUPTHER

## 2018-12-31 NOTE — PERIOP NOTES
Recent Results (from the past 12 hour(s)) MSSA/MRSA SC BY PCR, NASAL SWAB Collection Time: 12/31/18 11:42 AM  
Result Value Ref Range Special Requests: NO SPECIAL REQUESTS Culture result:     
  SA target not detected. A MRSA NEGATIVE, SA NEGATIVE test result does not preclude MRSA or SA nasal colonization. CBC WITH AUTOMATED DIFF Collection Time: 12/31/18 11:44 AM  
Result Value Ref Range WBC 7.9 4.3 - 11.1 K/uL  
 RBC 3.66 (L) 4.05 - 5.2 M/uL  
 HGB 11.2 (L) 11.7 - 15.4 g/dL HCT 35.6 (L) 35.8 - 46.3 % MCV 97.3 79.6 - 97.8 FL  
 MCH 30.6 26.1 - 32.9 PG  
 MCHC 31.5 31.4 - 35.0 g/dL  
 RDW 14.6 11.9 - 14.6 % PLATELET 905 197 - 103 K/uL MPV 9.8 9.4 - 12.3 FL ABSOLUTE NRBC 0.00 0.0 - 0.2 K/uL  
 DF AUTOMATED NEUTROPHILS 62 43 - 78 % LYMPHOCYTES 27 13 - 44 % MONOCYTES 7 4.0 - 12.0 % EOSINOPHILS 3 0.5 - 7.8 % BASOPHILS 0 0.0 - 2.0 % IMMATURE GRANULOCYTES 0 0.0 - 5.0 %  
 ABS. NEUTROPHILS 5.0 1.7 - 8.2 K/UL  
 ABS. LYMPHOCYTES 2.2 0.5 - 4.6 K/UL  
 ABS. MONOCYTES 0.6 0.1 - 1.3 K/UL  
 ABS. EOSINOPHILS 0.2 0.0 - 0.8 K/UL  
 ABS. BASOPHILS 0.0 0.0 - 0.2 K/UL  
 ABS. IMM. GRANS. 0.0 0.0 - 0.5 K/UL METABOLIC PANEL, BASIC Collection Time: 12/31/18 11:44 AM  
Result Value Ref Range Sodium 137 136 - 145 mmol/L Potassium 5.0 3.5 - 5.1 mmol/L Chloride 104 98 - 107 mmol/L  
 CO2 28 21 - 32 mmol/L Anion gap 5 (L) 7 - 16 mmol/L Glucose 93 65 - 100 mg/dL BUN 14 8 - 23 MG/DL Creatinine 0.70 0.6 - 1.0 MG/DL  
 GFR est AA >60 >60 ml/min/1.73m2 GFR est non-AA >60 >60 ml/min/1.73m2 Calcium 8.9 8.3 - 10.4 MG/DL URINALYSIS W/ RFLX MICROSCOPIC Collection Time: 12/31/18 11:44 AM  
Result Value Ref Range Color YELLOW Appearance CLEAR Specific gravity 1.010 1.001 - 1.023    
 pH (UA) 7.5 5.0 - 9.0 Protein NEGATIVE  NEG mg/dL Glucose NEGATIVE  mg/dL Ketone NEGATIVE  NEG mg/dL Bilirubin NEGATIVE  NEG Blood TRACE (A) NEG Urobilinogen 0.2 0.2 - 1.0 EU/dL Nitrites NEGATIVE  NEG Leukocyte Esterase NEGATIVE  NEG    
 WBC 0 0 /hpf  
 RBC 3-5 0 /hpf Epithelial cells 0 0 /hpf Bacteria 0 0 /hpf Casts 0 0 /lpf Labs reviewed, no further action required.

## 2018-12-31 NOTE — PERIOP NOTES
Patient verified name & . Order to obtain consent found in EHR and matches case posting. TYPE  CASE:3             
Orders:  received Labs per Spine protocol:  CBC with Diff, BMP and UA Labs per anesthesia protocol: Type and screen signed and held DOS 
EKG/cardiac records  :  Not required Glucose: not required Dr. Zack Luo notified of surgery >4 hours. Reviewed patient health history. Per Dr. Zack Luo patient will be assessed DOS and no further orders received. Medication bottles medication list visualized today. Instructed patient to continue previous medications as prescribed prior to surgery and  to take the following medications the day of surgery according to anesthesia guidelines with a small sip of water : Amlodipine, Bupropien, Lorazepam, Protonix, Gabapentin Continue all previous medications unless otherwise directed. Instructed patient to hold all vitamins 7 days prior to surgery and the following medications prior to surgery: Meloxicam 
 
Pt viewed Spine Pre-hab video. All further questions were addressed. Pt was provided with antibacterial soap, Hibiclens, long-handled sponge, Spine Recovery booklet and incentive spirometer. Pt correctly demonstrated use of incentive spirometer and instruction to bring it to the hospital on day of surgery. Handouts and all Surgery instructions provided to pt and pt verbalizes understanding. Patient Guide to Surgery Packet provided to patient. Packet includes Patient Guide to surgery handout, Facts about Pain Management handout, Facts about Urinary Catherization handout, Hand Hygiene handout, Patient Education and Teaching Sheet -Transfusion of Blood and Blood Products handout, and  [facility] frequent question and answer sheet. Guide reviewed with the patient and all questions answered to the satisfaction of the patient. Pt advised to visit www. [employer] for more educational information regarding anesthesia and to record any additional questions that arise so that it can be addressed by the anesthesiologist on the morning of surgery. Patient instructed on the following and verbalized understanding: 
Arrive at MAIN entrance, time of arrival to be called the day before by 1700. Responsible adult must drive patient to and from hospital, stay during surgery and 24 hours postoperatively. Npo after midnight including gum, mints and ice chips. Shower using hibiclens the night before and the morning of surgery. Hibiclens provided to the patient with handout and verbal instructions for use. Leave all valuables at home. Instructed on no jewelry or body piercings on the dos. Bring insurance card and ID. No perfumes, oil, powder, colognes, makeup or  lotions on the skin. Patient verbalized understanding of all instructions and provided all medical/health information to the best of their ability.

## 2019-01-06 ENCOUNTER — ANESTHESIA EVENT (OUTPATIENT)
Dept: SURGERY | Age: 64
DRG: 454 | End: 2019-01-06
Payer: MEDICARE

## 2019-01-07 ENCOUNTER — APPOINTMENT (OUTPATIENT)
Dept: GENERAL RADIOLOGY | Age: 64
DRG: 454 | End: 2019-01-07
Attending: ORTHOPAEDIC SURGERY
Payer: MEDICARE

## 2019-01-07 ENCOUNTER — ANESTHESIA (OUTPATIENT)
Dept: SURGERY | Age: 64
DRG: 454 | End: 2019-01-07
Payer: MEDICARE

## 2019-01-07 ENCOUNTER — HOSPITAL ENCOUNTER (INPATIENT)
Age: 64
LOS: 3 days | Discharge: HOME | DRG: 454 | End: 2019-01-10
Attending: ORTHOPAEDIC SURGERY | Admitting: ORTHOPAEDIC SURGERY
Payer: MEDICARE

## 2019-01-07 DIAGNOSIS — Z87.81 HISTORY OF PELVIC FRACTURE: ICD-10-CM

## 2019-01-07 DIAGNOSIS — M48.062 SPINAL STENOSIS OF LUMBAR REGION WITH NEUROGENIC CLAUDICATION: Primary | ICD-10-CM

## 2019-01-07 DIAGNOSIS — G89.29 OTHER CHRONIC PAIN: ICD-10-CM

## 2019-01-07 PROBLEM — M48.00 SPINAL STENOSIS: Status: ACTIVE | Noted: 2019-01-07

## 2019-01-07 LAB
ANION GAP SERPL CALC-SCNC: 6 MMOL/L (ref 7–16)
BASE DEFICIT BLD-SCNC: 2 MMOL/L
BASOPHILS # BLD: 0 K/UL (ref 0–0.2)
BASOPHILS NFR BLD: 0 % (ref 0–2)
BUN SERPL-MCNC: 12 MG/DL (ref 8–23)
CA-I BLD-MCNC: 1.13 MMOL/L (ref 1.12–1.32)
CALCIUM SERPL-MCNC: 7.1 MG/DL (ref 8.3–10.4)
CHLORIDE SERPL-SCNC: 109 MMOL/L (ref 98–107)
CO2 SERPL-SCNC: 24 MMOL/L (ref 21–32)
CREAT SERPL-MCNC: 0.63 MG/DL (ref 0.6–1)
DIFFERENTIAL METHOD BLD: ABNORMAL
EOSINOPHIL # BLD: 0 K/UL (ref 0–0.8)
EOSINOPHIL NFR BLD: 0 % (ref 0.5–7.8)
ERYTHROCYTE [DISTWIDTH] IN BLOOD BY AUTOMATED COUNT: 14.4 % (ref 11.9–14.6)
GLUCOSE BLD STRIP.AUTO-MCNC: 122 MG/DL (ref 65–100)
GLUCOSE SERPL-MCNC: 147 MG/DL (ref 65–100)
HCO3 BLD-SCNC: 23.5 MMOL/L (ref 22–26)
HCT VFR BLD AUTO: 19.3 % (ref 35.8–46.3)
HGB BLD-MCNC: 6.1 G/DL (ref 11.7–15.4)
IMM GRANULOCYTES # BLD: 0.1 K/UL (ref 0–0.5)
IMM GRANULOCYTES NFR BLD AUTO: 1 % (ref 0–5)
LYMPHOCYTES # BLD: 1 K/UL (ref 0.5–4.6)
LYMPHOCYTES NFR BLD: 9 % (ref 13–44)
MCH RBC QN AUTO: 30.7 PG (ref 26.1–32.9)
MCHC RBC AUTO-ENTMCNC: 31.6 G/DL (ref 31.4–35)
MCV RBC AUTO: 97 FL (ref 79.6–97.8)
MONOCYTES # BLD: 0.2 K/UL (ref 0.1–1.3)
MONOCYTES NFR BLD: 2 % (ref 4–12)
NEUTS SEG # BLD: 10.2 K/UL (ref 1.7–8.2)
NEUTS SEG NFR BLD: 89 % (ref 43–78)
NRBC # BLD: 0 K/UL (ref 0–0.2)
PCO2 BLD: 42 MMHG (ref 35–45)
PH BLD: 7.36 [PH] (ref 7.35–7.45)
PLATELET # BLD AUTO: 288 K/UL (ref 150–450)
PMV BLD AUTO: 9.8 FL (ref 9.4–12.3)
PO2 BLD: 235 MMHG (ref 75–100)
POTASSIUM BLD-SCNC: 3.7 MMOL/L (ref 3.5–5.1)
POTASSIUM SERPL-SCNC: 4 MMOL/L (ref 3.5–5.1)
RBC # BLD AUTO: 1.99 M/UL (ref 4.05–5.2)
SAO2 % BLD: 100 % (ref 95–98)
SODIUM BLD-SCNC: 138 MMOL/L (ref 136–145)
SODIUM SERPL-SCNC: 139 MMOL/L (ref 136–145)
WBC # BLD AUTO: 11.4 K/UL (ref 4.3–11.1)

## 2019-01-07 PROCEDURE — 74011000250 HC RX REV CODE- 250

## 2019-01-07 PROCEDURE — 77030020782 HC GWN BAIR PAWS FLX 3M -B: Performed by: REGISTERED NURSE

## 2019-01-07 PROCEDURE — 77030020407 HC IV BLD WRMR ST 3M -A: Performed by: REGISTERED NURSE

## 2019-01-07 PROCEDURE — 77030013794 HC KT TRNSDUC BLD EDWD -B: Performed by: REGISTERED NURSE

## 2019-01-07 PROCEDURE — 74011250636 HC RX REV CODE- 250/636

## 2019-01-07 PROCEDURE — 72100 X-RAY EXAM L-S SPINE 2/3 VWS: CPT

## 2019-01-07 PROCEDURE — 74011250636 HC RX REV CODE- 250/636: Performed by: ANESTHESIOLOGY

## 2019-01-07 PROCEDURE — 77030002946 HC SUT NRLN J&J -B: Performed by: ORTHOPAEDIC SURGERY

## 2019-01-07 PROCEDURE — 0SG10AJ FUSION OF 2 OR MORE LUMBAR VERTEBRAL JOINTS WITH INTERBODY FUSION DEVICE, POSTERIOR APPROACH, ANTERIOR COLUMN, OPEN APPROACH: ICD-10-PCS | Performed by: ORTHOPAEDIC SURGERY

## 2019-01-07 PROCEDURE — 77030032490 HC SLV COMPR SCD KNE COVD -B: Performed by: ORTHOPAEDIC SURGERY

## 2019-01-07 PROCEDURE — 77030038601 HC DEV SYS W/CANN LITE BIO STRY -F: Performed by: ORTHOPAEDIC SURGERY

## 2019-01-07 PROCEDURE — P9016 RBC LEUKOCYTES REDUCED: HCPCS

## 2019-01-07 PROCEDURE — 77030018836 HC SOL IRR NACL ICUM -A: Performed by: ORTHOPAEDIC SURGERY

## 2019-01-07 PROCEDURE — 77030039425 HC BLD LARYNG TRULITE DISP TELE -A: Performed by: REGISTERED NURSE

## 2019-01-07 PROCEDURE — 82947 ASSAY GLUCOSE BLOOD QUANT: CPT

## 2019-01-07 PROCEDURE — 77030014650 HC SEAL MTRX FLOSEL BAXT -C: Performed by: ORTHOPAEDIC SURGERY

## 2019-01-07 PROCEDURE — 77030030163 HC BN WAX J&J -A: Performed by: ORTHOPAEDIC SURGERY

## 2019-01-07 PROCEDURE — 76210000016 HC OR PH I REC 1 TO 1.5 HR: Performed by: ORTHOPAEDIC SURGERY

## 2019-01-07 PROCEDURE — 74011000272 HC RX REV CODE- 272: Performed by: ORTHOPAEDIC SURGERY

## 2019-01-07 PROCEDURE — 77030037143 HC ROD SPN HEX XIA TI STRY -D: Performed by: ORTHOPAEDIC SURGERY

## 2019-01-07 PROCEDURE — 77030037158 HC BIT DRL SPN XIA DISP STRY -C: Performed by: ORTHOPAEDIC SURGERY

## 2019-01-07 PROCEDURE — 77030012891: Performed by: ORTHOPAEDIC SURGERY

## 2019-01-07 PROCEDURE — 76010000177 HC OR TIME 5 TO 5.5 HR INTENSV-TIER 1: Performed by: ORTHOPAEDIC SURGERY

## 2019-01-07 PROCEDURE — 77030037710: Performed by: ORTHOPAEDIC SURGERY

## 2019-01-07 PROCEDURE — 36430 TRANSFUSION BLD/BLD COMPNT: CPT

## 2019-01-07 PROCEDURE — 77030034760 HC NDL BN MAR ASPIR JAMSH STRY -B: Performed by: ORTHOPAEDIC SURGERY

## 2019-01-07 PROCEDURE — 86923 COMPATIBILITY TEST ELECTRIC: CPT

## 2019-01-07 PROCEDURE — 85025 COMPLETE CBC W/AUTO DIFF WBC: CPT

## 2019-01-07 PROCEDURE — 86900 BLOOD TYPING SEROLOGIC ABO: CPT

## 2019-01-07 PROCEDURE — 77030014368: Performed by: ORTHOPAEDIC SURGERY

## 2019-01-07 PROCEDURE — 65270000029 HC RM PRIVATE

## 2019-01-07 PROCEDURE — 77030014647 HC SEAL FBRN TISSL BAXT -D: Performed by: ORTHOPAEDIC SURGERY

## 2019-01-07 PROCEDURE — 77030013292 HC BOWL MX PRSM J&J -A: Performed by: REGISTERED NURSE

## 2019-01-07 PROCEDURE — 77030005401 HC CATH RAD ARRO -A: Performed by: REGISTERED NURSE

## 2019-01-07 PROCEDURE — 0SG10J1 FUSION OF 2 OR MORE LUMBAR VERTEBRAL JOINTS WITH SYNTHETIC SUBSTITUTE, POSTERIOR APPROACH, POSTERIOR COLUMN, OPEN APPROACH: ICD-10-PCS | Performed by: ORTHOPAEDIC SURGERY

## 2019-01-07 PROCEDURE — 77030034850: Performed by: ORTHOPAEDIC SURGERY

## 2019-01-07 PROCEDURE — 76060000041 HC ANESTHESIA 5 TO 5.5 HR: Performed by: ORTHOPAEDIC SURGERY

## 2019-01-07 PROCEDURE — 74011250636 HC RX REV CODE- 250/636: Performed by: ORTHOPAEDIC SURGERY

## 2019-01-07 PROCEDURE — 07DR3ZZ EXTRACTION OF ILIAC BONE MARROW, PERCUTANEOUS APPROACH: ICD-10-PCS | Performed by: ORTHOPAEDIC SURGERY

## 2019-01-07 PROCEDURE — 01NB0ZZ RELEASE LUMBAR NERVE, OPEN APPROACH: ICD-10-PCS | Performed by: ORTHOPAEDIC SURGERY

## 2019-01-07 PROCEDURE — C1713 ANCHOR/SCREW BN/BN,TIS/BN: HCPCS | Performed by: ORTHOPAEDIC SURGERY

## 2019-01-07 PROCEDURE — P9045 ALBUMIN (HUMAN), 5%, 250 ML: HCPCS

## 2019-01-07 PROCEDURE — 77030034475 HC MISC IMPL SPN: Performed by: ORTHOPAEDIC SURGERY

## 2019-01-07 PROCEDURE — 77030020263 HC SOL INJ SOD CL0.9% LFCR 1000ML

## 2019-01-07 PROCEDURE — 77030037088 HC TUBE ENDOTRACH ORAL NSL COVD-A: Performed by: REGISTERED NURSE

## 2019-01-07 PROCEDURE — 77030014007 HC SPNG HEMSTAT J&J -B: Performed by: ORTHOPAEDIC SURGERY

## 2019-01-07 PROCEDURE — 77030039270 HC TU BLD FLTR CARD -A

## 2019-01-07 PROCEDURE — 74011250637 HC RX REV CODE- 250/637: Performed by: ANESTHESIOLOGY

## 2019-01-07 PROCEDURE — 82803 BLOOD GASES ANY COMBINATION: CPT

## 2019-01-07 PROCEDURE — 77030025623 HC BUR RND PRECIS STRY -D: Performed by: ORTHOPAEDIC SURGERY

## 2019-01-07 PROCEDURE — 77030037145 HC XCONN SPN POLYX XIA STRY -G: Performed by: ORTHOPAEDIC SURGERY

## 2019-01-07 PROCEDURE — 77030019940 HC BLNKT HYPOTHRM STRY -B: Performed by: REGISTERED NURSE

## 2019-01-07 PROCEDURE — 77030031139 HC SUT VCRL2 J&J -A: Performed by: ORTHOPAEDIC SURGERY

## 2019-01-07 PROCEDURE — 74011250637 HC RX REV CODE- 250/637: Performed by: ORTHOPAEDIC SURGERY

## 2019-01-07 PROCEDURE — 80048 BASIC METABOLIC PNL TOTAL CA: CPT

## 2019-01-07 DEVICE — BLOCKER
Type: IMPLANTABLE DEVICE | Site: SPINE LUMBAR | Status: FUNCTIONAL
Brand: XIA 4.5 SYSTEM -  XIA CT

## 2019-01-07 DEVICE — POLYAXIAL CORTICAL SCREW
Type: IMPLANTABLE DEVICE | Site: SPINE LUMBAR | Status: FUNCTIONAL
Brand: XIA 4.5 SYSTEM -  XIA CT

## 2019-01-07 DEVICE — BIO DBM PLUS PUTTY (WITH CANCELLOUS)
Type: IMPLANTABLE DEVICE | Site: SPINE LUMBAR | Status: FUNCTIONAL
Brand: BIO DBM

## 2019-01-07 DEVICE — GRAFT BNE SUB 20CC 2 4MM GRAN GROWTH FACT ALLGRFT OSTEOAMP: Type: IMPLANTABLE DEVICE | Site: SPINE LUMBAR | Status: FUNCTIONAL

## 2019-01-07 DEVICE — 30-36 MM POLYAXIAL CROSS CONNECTOR
Type: IMPLANTABLE DEVICE | Site: SPINE LUMBAR | Status: FUNCTIONAL
Brand: XIA 4 5

## 2019-01-07 DEVICE — VITALLIUM PREBENT AND PRECUT ROD WITH HEX
Type: IMPLANTABLE DEVICE | Site: SPINE LUMBAR | Status: FUNCTIONAL
Brand: XIA 4 5

## 2019-01-07 RX ORDER — SODIUM CHLORIDE 0.9 % (FLUSH) 0.9 %
5-40 SYRINGE (ML) INJECTION AS NEEDED
Status: DISCONTINUED | OUTPATIENT
Start: 2019-01-07 | End: 2019-01-10 | Stop reason: HOSPADM

## 2019-01-07 RX ORDER — LORAZEPAM 1 MG/1
1 TABLET ORAL
Status: DISCONTINUED | OUTPATIENT
Start: 2019-01-07 | End: 2019-01-10 | Stop reason: HOSPADM

## 2019-01-07 RX ORDER — SODIUM CHLORIDE 9 MG/ML
250 INJECTION, SOLUTION INTRAVENOUS AS NEEDED
Status: DISCONTINUED | OUTPATIENT
Start: 2019-01-07 | End: 2019-01-10 | Stop reason: HOSPADM

## 2019-01-07 RX ORDER — HYDROMORPHONE HYDROCHLORIDE 2 MG/ML
0.5 INJECTION, SOLUTION INTRAMUSCULAR; INTRAVENOUS; SUBCUTANEOUS
Status: COMPLETED | OUTPATIENT
Start: 2019-01-07 | End: 2019-01-07

## 2019-01-07 RX ORDER — PANTOPRAZOLE SODIUM 40 MG/1
40 TABLET, DELAYED RELEASE ORAL
Status: DISCONTINUED | OUTPATIENT
Start: 2019-01-08 | End: 2019-01-10 | Stop reason: HOSPADM

## 2019-01-07 RX ORDER — SODIUM CHLORIDE, SODIUM LACTATE, POTASSIUM CHLORIDE, CALCIUM CHLORIDE 600; 310; 30; 20 MG/100ML; MG/100ML; MG/100ML; MG/100ML
75 INJECTION, SOLUTION INTRAVENOUS CONTINUOUS
Status: DISCONTINUED | OUTPATIENT
Start: 2019-01-07 | End: 2019-01-07 | Stop reason: HOSPADM

## 2019-01-07 RX ORDER — DIPHENHYDRAMINE HCL 25 MG
25 CAPSULE ORAL
Status: DISCONTINUED | OUTPATIENT
Start: 2019-01-07 | End: 2019-01-10 | Stop reason: HOSPADM

## 2019-01-07 RX ORDER — MEDROXYPROGESTERONE ACETATE 10 MG/1
2.5 TABLET ORAL DAILY
Status: DISCONTINUED | OUTPATIENT
Start: 2019-01-08 | End: 2019-01-10 | Stop reason: HOSPADM

## 2019-01-07 RX ORDER — MELATONIN
5000 DAILY
Status: DISCONTINUED | OUTPATIENT
Start: 2019-01-08 | End: 2019-01-10 | Stop reason: HOSPADM

## 2019-01-07 RX ORDER — EPHEDRINE SULFATE 50 MG/ML
INJECTION, SOLUTION INTRAVENOUS AS NEEDED
Status: DISCONTINUED | OUTPATIENT
Start: 2019-01-07 | End: 2019-01-07 | Stop reason: HOSPADM

## 2019-01-07 RX ORDER — VANCOMYCIN HYDROCHLORIDE 1 G/20ML
INJECTION, POWDER, LYOPHILIZED, FOR SOLUTION INTRAVENOUS AS NEEDED
Status: DISCONTINUED | OUTPATIENT
Start: 2019-01-07 | End: 2019-01-07 | Stop reason: HOSPADM

## 2019-01-07 RX ORDER — FAMOTIDINE 20 MG/1
20 TABLET, FILM COATED ORAL EVERY 12 HOURS
Status: DISCONTINUED | OUTPATIENT
Start: 2019-01-07 | End: 2019-01-10 | Stop reason: HOSPADM

## 2019-01-07 RX ORDER — FENTANYL CITRATE 50 UG/ML
INJECTION, SOLUTION INTRAMUSCULAR; INTRAVENOUS AS NEEDED
Status: DISCONTINUED | OUTPATIENT
Start: 2019-01-07 | End: 2019-01-07 | Stop reason: HOSPADM

## 2019-01-07 RX ORDER — GABAPENTIN 100 MG/1
100 CAPSULE ORAL 3 TIMES DAILY
Status: DISCONTINUED | OUTPATIENT
Start: 2019-01-07 | End: 2019-01-10 | Stop reason: HOSPADM

## 2019-01-07 RX ORDER — ROCURONIUM BROMIDE 10 MG/ML
INJECTION, SOLUTION INTRAVENOUS AS NEEDED
Status: DISCONTINUED | OUTPATIENT
Start: 2019-01-07 | End: 2019-01-07 | Stop reason: HOSPADM

## 2019-01-07 RX ORDER — BUPROPION HYDROCHLORIDE 150 MG/1
150 TABLET, EXTENDED RELEASE ORAL EVERY 12 HOURS
Status: DISCONTINUED | OUTPATIENT
Start: 2019-01-07 | End: 2019-01-10 | Stop reason: HOSPADM

## 2019-01-07 RX ORDER — ACETAMINOPHEN 10 MG/ML
INJECTION, SOLUTION INTRAVENOUS AS NEEDED
Status: DISCONTINUED | OUTPATIENT
Start: 2019-01-07 | End: 2019-01-07 | Stop reason: HOSPADM

## 2019-01-07 RX ORDER — OXYCODONE HYDROCHLORIDE 5 MG/1
10 TABLET ORAL
Status: DISCONTINUED | OUTPATIENT
Start: 2019-01-07 | End: 2019-01-07 | Stop reason: HOSPADM

## 2019-01-07 RX ORDER — ADHESIVE BANDAGE
30 BANDAGE TOPICAL DAILY
Status: DISCONTINUED | OUTPATIENT
Start: 2019-01-08 | End: 2019-01-10 | Stop reason: HOSPADM

## 2019-01-07 RX ORDER — MIDAZOLAM HYDROCHLORIDE 1 MG/ML
2 INJECTION, SOLUTION INTRAMUSCULAR; INTRAVENOUS ONCE
Status: COMPLETED | OUTPATIENT
Start: 2019-01-07 | End: 2019-01-07

## 2019-01-07 RX ORDER — AMLODIPINE BESYLATE 5 MG/1
5 TABLET ORAL DAILY
Status: DISCONTINUED | OUTPATIENT
Start: 2019-01-08 | End: 2019-01-10 | Stop reason: HOSPADM

## 2019-01-07 RX ORDER — GLYCOPYRROLATE 0.2 MG/ML
INJECTION INTRAMUSCULAR; INTRAVENOUS AS NEEDED
Status: DISCONTINUED | OUTPATIENT
Start: 2019-01-07 | End: 2019-01-07 | Stop reason: HOSPADM

## 2019-01-07 RX ORDER — CEFAZOLIN SODIUM/WATER 2 G/20 ML
2 SYRINGE (ML) INTRAVENOUS EVERY 8 HOURS
Status: COMPLETED | OUTPATIENT
Start: 2019-01-07 | End: 2019-01-08

## 2019-01-07 RX ORDER — VALSARTAN 160 MG/1
160 TABLET ORAL DAILY
Status: DISCONTINUED | OUTPATIENT
Start: 2019-01-08 | End: 2019-01-10 | Stop reason: HOSPADM

## 2019-01-07 RX ORDER — DEXAMETHASONE SODIUM PHOSPHATE 4 MG/ML
INJECTION, SOLUTION INTRA-ARTICULAR; INTRALESIONAL; INTRAMUSCULAR; INTRAVENOUS; SOFT TISSUE AS NEEDED
Status: DISCONTINUED | OUTPATIENT
Start: 2019-01-07 | End: 2019-01-07 | Stop reason: HOSPADM

## 2019-01-07 RX ORDER — ZOLPIDEM TARTRATE 5 MG/1
5 TABLET ORAL
Status: DISCONTINUED | OUTPATIENT
Start: 2019-01-07 | End: 2019-01-08 | Stop reason: DRUGHIGH

## 2019-01-07 RX ORDER — ONDANSETRON 2 MG/ML
4 INJECTION INTRAMUSCULAR; INTRAVENOUS
Status: DISCONTINUED | OUTPATIENT
Start: 2019-01-07 | End: 2019-01-10 | Stop reason: HOSPADM

## 2019-01-07 RX ORDER — OXYCODONE HYDROCHLORIDE 5 MG/1
5 TABLET ORAL
Status: COMPLETED | OUTPATIENT
Start: 2019-01-07 | End: 2019-01-07

## 2019-01-07 RX ORDER — FAMOTIDINE 20 MG/1
20 TABLET, FILM COATED ORAL ONCE
Status: COMPLETED | OUTPATIENT
Start: 2019-01-07 | End: 2019-01-07

## 2019-01-07 RX ORDER — NEOSTIGMINE METHYLSULFATE 1 MG/ML
INJECTION INTRAVENOUS AS NEEDED
Status: DISCONTINUED | OUTPATIENT
Start: 2019-01-07 | End: 2019-01-07 | Stop reason: HOSPADM

## 2019-01-07 RX ORDER — PROPOFOL 10 MG/ML
INJECTION, EMULSION INTRAVENOUS AS NEEDED
Status: DISCONTINUED | OUTPATIENT
Start: 2019-01-07 | End: 2019-01-07 | Stop reason: HOSPADM

## 2019-01-07 RX ORDER — CYCLOBENZAPRINE HCL 10 MG
5 TABLET ORAL
Status: DISCONTINUED | OUTPATIENT
Start: 2019-01-07 | End: 2019-01-10 | Stop reason: HOSPADM

## 2019-01-07 RX ORDER — SODIUM CHLORIDE, SODIUM LACTATE, POTASSIUM CHLORIDE, CALCIUM CHLORIDE 600; 310; 30; 20 MG/100ML; MG/100ML; MG/100ML; MG/100ML
INJECTION, SOLUTION INTRAVENOUS
Status: DISCONTINUED | OUTPATIENT
Start: 2019-01-07 | End: 2019-01-07 | Stop reason: HOSPADM

## 2019-01-07 RX ORDER — MIDAZOLAM HYDROCHLORIDE 1 MG/ML
2 INJECTION, SOLUTION INTRAMUSCULAR; INTRAVENOUS ONCE
Status: DISCONTINUED | OUTPATIENT
Start: 2019-01-07 | End: 2019-01-07 | Stop reason: HOSPADM

## 2019-01-07 RX ORDER — ALBUMIN HUMAN 50 G/1000ML
SOLUTION INTRAVENOUS AS NEEDED
Status: DISCONTINUED | OUTPATIENT
Start: 2019-01-07 | End: 2019-01-07 | Stop reason: HOSPADM

## 2019-01-07 RX ORDER — FENTANYL CITRATE 50 UG/ML
100 INJECTION, SOLUTION INTRAMUSCULAR; INTRAVENOUS ONCE
Status: DISCONTINUED | OUTPATIENT
Start: 2019-01-07 | End: 2019-01-07 | Stop reason: HOSPADM

## 2019-01-07 RX ORDER — HYDROMORPHONE HYDROCHLORIDE 2 MG/ML
0.5 INJECTION, SOLUTION INTRAMUSCULAR; INTRAVENOUS; SUBCUTANEOUS
Status: DISCONTINUED | OUTPATIENT
Start: 2019-01-07 | End: 2019-01-07 | Stop reason: HOSPADM

## 2019-01-07 RX ORDER — DEXTROSE, SODIUM CHLORIDE, AND POTASSIUM CHLORIDE 5; .45; .15 G/100ML; G/100ML; G/100ML
75 INJECTION INTRAVENOUS CONTINUOUS
Status: DISCONTINUED | OUTPATIENT
Start: 2019-01-07 | End: 2019-01-10 | Stop reason: HOSPADM

## 2019-01-07 RX ORDER — SODIUM CHLORIDE 0.9 % (FLUSH) 0.9 %
5-40 SYRINGE (ML) INJECTION EVERY 8 HOURS
Status: DISCONTINUED | OUTPATIENT
Start: 2019-01-07 | End: 2019-01-10 | Stop reason: HOSPADM

## 2019-01-07 RX ORDER — MELOXICAM 7.5 MG/1
7.5 TABLET ORAL DAILY
Status: DISCONTINUED | OUTPATIENT
Start: 2019-01-08 | End: 2019-01-10 | Stop reason: HOSPADM

## 2019-01-07 RX ORDER — HYDROCODONE BITARTRATE AND ACETAMINOPHEN 7.5; 325 MG/1; MG/1
1 TABLET ORAL
Status: DISCONTINUED | OUTPATIENT
Start: 2019-01-07 | End: 2019-01-10 | Stop reason: HOSPADM

## 2019-01-07 RX ORDER — ONDANSETRON 2 MG/ML
INJECTION INTRAMUSCULAR; INTRAVENOUS AS NEEDED
Status: DISCONTINUED | OUTPATIENT
Start: 2019-01-07 | End: 2019-01-07 | Stop reason: HOSPADM

## 2019-01-07 RX ORDER — CEFAZOLIN SODIUM/WATER 2 G/20 ML
2 SYRINGE (ML) INTRAVENOUS ONCE
Status: COMPLETED | OUTPATIENT
Start: 2019-01-07 | End: 2019-01-07

## 2019-01-07 RX ORDER — LIDOCAINE HYDROCHLORIDE 10 MG/ML
0.1 INJECTION INFILTRATION; PERINEURAL AS NEEDED
Status: DISCONTINUED | OUTPATIENT
Start: 2019-01-07 | End: 2019-01-07 | Stop reason: HOSPADM

## 2019-01-07 RX ORDER — LIDOCAINE HYDROCHLORIDE 20 MG/ML
INJECTION, SOLUTION EPIDURAL; INFILTRATION; INTRACAUDAL; PERINEURAL AS NEEDED
Status: DISCONTINUED | OUTPATIENT
Start: 2019-01-07 | End: 2019-01-07 | Stop reason: HOSPADM

## 2019-01-07 RX ORDER — HYDROMORPHONE HYDROCHLORIDE 1 MG/ML
1 INJECTION, SOLUTION INTRAMUSCULAR; INTRAVENOUS; SUBCUTANEOUS
Status: DISCONTINUED | OUTPATIENT
Start: 2019-01-07 | End: 2019-01-10 | Stop reason: HOSPADM

## 2019-01-07 RX ADMIN — EPHEDRINE SULFATE 5 MG: 50 INJECTION, SOLUTION INTRAVENOUS at 15:58

## 2019-01-07 RX ADMIN — SODIUM CHLORIDE, SODIUM LACTATE, POTASSIUM CHLORIDE, AND CALCIUM CHLORIDE 75 ML/HR: 600; 310; 30; 20 INJECTION, SOLUTION INTRAVENOUS at 11:00

## 2019-01-07 RX ADMIN — NEOSTIGMINE METHYLSULFATE 2 MG: 1 INJECTION INTRAVENOUS at 16:19

## 2019-01-07 RX ADMIN — SODIUM CHLORIDE, SODIUM LACTATE, POTASSIUM CHLORIDE, CALCIUM CHLORIDE: 600; 310; 30; 20 INJECTION, SOLUTION INTRAVENOUS at 15:59

## 2019-01-07 RX ADMIN — HYDROMORPHONE HYDROCHLORIDE 0.5 MG: 2 INJECTION, SOLUTION INTRAMUSCULAR; INTRAVENOUS; SUBCUTANEOUS at 18:27

## 2019-01-07 RX ADMIN — SODIUM CHLORIDE, SODIUM LACTATE, POTASSIUM CHLORIDE, AND CALCIUM CHLORIDE: 600; 310; 30; 20 INJECTION, SOLUTION INTRAVENOUS at 15:00

## 2019-01-07 RX ADMIN — SODIUM CHLORIDE, SODIUM LACTATE, POTASSIUM CHLORIDE, CALCIUM CHLORIDE: 600; 310; 30; 20 INJECTION, SOLUTION INTRAVENOUS at 12:07

## 2019-01-07 RX ADMIN — PROPOFOL 30 MG: 10 INJECTION, EMULSION INTRAVENOUS at 15:29

## 2019-01-07 RX ADMIN — FENTANYL CITRATE 25 MCG: 50 INJECTION, SOLUTION INTRAMUSCULAR; INTRAVENOUS at 15:29

## 2019-01-07 RX ADMIN — LIDOCAINE HYDROCHLORIDE 100 MG: 20 INJECTION, SOLUTION EPIDURAL; INFILTRATION; INTRACAUDAL; PERINEURAL at 11:45

## 2019-01-07 RX ADMIN — MIDAZOLAM HYDROCHLORIDE 2 MG: 1 INJECTION, SOLUTION INTRAMUSCULAR; INTRAVENOUS at 11:15

## 2019-01-07 RX ADMIN — ACETAMINOPHEN 1000 MG: 10 INJECTION, SOLUTION INTRAVENOUS at 16:30

## 2019-01-07 RX ADMIN — FENTANYL CITRATE 25 MCG: 50 INJECTION, SOLUTION INTRAMUSCULAR; INTRAVENOUS at 14:28

## 2019-01-07 RX ADMIN — Medication 2 G: at 15:38

## 2019-01-07 RX ADMIN — Medication 5 ML: at 22:00

## 2019-01-07 RX ADMIN — FENTANYL CITRATE 25 MCG: 50 INJECTION, SOLUTION INTRAMUSCULAR; INTRAVENOUS at 13:23

## 2019-01-07 RX ADMIN — HYDROMORPHONE HYDROCHLORIDE 0.5 MG: 2 INJECTION, SOLUTION INTRAMUSCULAR; INTRAVENOUS; SUBCUTANEOUS at 17:25

## 2019-01-07 RX ADMIN — ONDANSETRON 4 MG: 2 INJECTION INTRAMUSCULAR; INTRAVENOUS at 16:16

## 2019-01-07 RX ADMIN — EPHEDRINE SULFATE 5 MG: 50 INJECTION, SOLUTION INTRAVENOUS at 15:57

## 2019-01-07 RX ADMIN — EPHEDRINE SULFATE 5 MG: 50 INJECTION, SOLUTION INTRAVENOUS at 15:25

## 2019-01-07 RX ADMIN — HYDROMORPHONE HYDROCHLORIDE 0.5 MG: 2 INJECTION, SOLUTION INTRAMUSCULAR; INTRAVENOUS; SUBCUTANEOUS at 17:18

## 2019-01-07 RX ADMIN — PROPOFOL 180 MG: 10 INJECTION, EMULSION INTRAVENOUS at 11:45

## 2019-01-07 RX ADMIN — HYDROCODONE BITARTRATE AND ACETAMINOPHEN 1 TABLET: 7.5; 325 TABLET ORAL at 23:13

## 2019-01-07 RX ADMIN — OXYCODONE HYDROCHLORIDE 5 MG: 5 TABLET ORAL at 18:53

## 2019-01-07 RX ADMIN — FENTANYL CITRATE 25 MCG: 50 INJECTION, SOLUTION INTRAMUSCULAR; INTRAVENOUS at 16:38

## 2019-01-07 RX ADMIN — GLYCOPYRROLATE 0.2 MG: 0.2 INJECTION INTRAMUSCULAR; INTRAVENOUS at 16:19

## 2019-01-07 RX ADMIN — GABAPENTIN 100 MG: 100 CAPSULE ORAL at 22:02

## 2019-01-07 RX ADMIN — ZOLPIDEM TARTRATE 5 MG: 5 TABLET ORAL at 23:13

## 2019-01-07 RX ADMIN — FENTANYL CITRATE 100 MCG: 50 INJECTION, SOLUTION INTRAMUSCULAR; INTRAVENOUS at 12:31

## 2019-01-07 RX ADMIN — FAMOTIDINE 20 MG: 20 TABLET ORAL at 11:14

## 2019-01-07 RX ADMIN — FENTANYL CITRATE 100 MCG: 50 INJECTION, SOLUTION INTRAMUSCULAR; INTRAVENOUS at 11:45

## 2019-01-07 RX ADMIN — ROCURONIUM BROMIDE 50 MG: 10 INJECTION, SOLUTION INTRAVENOUS at 11:45

## 2019-01-07 RX ADMIN — EPHEDRINE SULFATE 2.5 MG: 50 INJECTION, SOLUTION INTRAVENOUS at 15:50

## 2019-01-07 RX ADMIN — DEXAMETHASONE SODIUM PHOSPHATE 4 MG: 4 INJECTION, SOLUTION INTRA-ARTICULAR; INTRALESIONAL; INTRAMUSCULAR; INTRAVENOUS; SOFT TISSUE at 14:25

## 2019-01-07 RX ADMIN — FENTANYL CITRATE 25 MCG: 50 INJECTION, SOLUTION INTRAMUSCULAR; INTRAVENOUS at 16:56

## 2019-01-07 RX ADMIN — FENTANYL CITRATE 25 MCG: 50 INJECTION, SOLUTION INTRAMUSCULAR; INTRAVENOUS at 16:27

## 2019-01-07 RX ADMIN — Medication 3 AMPULE: at 11:14

## 2019-01-07 RX ADMIN — EPHEDRINE SULFATE 5 MG: 50 INJECTION, SOLUTION INTRAVENOUS at 14:34

## 2019-01-07 RX ADMIN — FENTANYL CITRATE 25 MCG: 50 INJECTION, SOLUTION INTRAMUSCULAR; INTRAVENOUS at 13:02

## 2019-01-07 RX ADMIN — HYDROMORPHONE HYDROCHLORIDE 0.5 MG: 2 INJECTION, SOLUTION INTRAMUSCULAR; INTRAVENOUS; SUBCUTANEOUS at 17:08

## 2019-01-07 RX ADMIN — FENTANYL CITRATE 25 MCG: 50 INJECTION, SOLUTION INTRAMUSCULAR; INTRAVENOUS at 15:15

## 2019-01-07 RX ADMIN — ONDANSETRON 4 MG: 2 INJECTION INTRAMUSCULAR; INTRAVENOUS at 22:02

## 2019-01-07 RX ADMIN — ALBUMIN HUMAN 500 ML: 50 SOLUTION INTRAVENOUS at 15:40

## 2019-01-07 RX ADMIN — Medication 1 AMPULE: at 22:02

## 2019-01-07 RX ADMIN — HYDROMORPHONE HYDROCHLORIDE 0.5 MG: 2 INJECTION, SOLUTION INTRAMUSCULAR; INTRAVENOUS; SUBCUTANEOUS at 17:13

## 2019-01-07 RX ADMIN — Medication 2 G: at 12:20

## 2019-01-07 NOTE — ANESTHESIA PROCEDURE NOTES
Arterial Line Placement Performed by: Evelia Moncada MD 
Authorized by: Evelia Moncada MD  
 
Pre-Procedure Indications:  Blood sampling and arterial pressure monitoring Preanesthetic Checklist: anesthesia consent Timeout Time: 11:53 Procedure:  
Prep:  Chlorhexidine Seldinger Technique?: Yes Orientation:  Left Location:  Radial artery Catheter size:  20 G Number of attempts:  1 Cont Cardiac Output Sensor: No   
 
Assessment:  
Post-procedure:  Line secured and sterile dressing applied Patient Tolerance:  Patient tolerated the procedure well with no immediate complications Comment:  
BY Gricelda Pina

## 2019-01-07 NOTE — PERIOP NOTES
Critical lab called for hemoglobin 6.1, results reported to Dr. Maribel Ramirez. Orders received for 2 units PRBCs

## 2019-01-07 NOTE — ANESTHESIA PREPROCEDURE EVALUATION
Anesthetic History No history of anesthetic complications Review of Systems / Medical History Patient summary reviewed and pertinent labs reviewed Pulmonary Within defined limits Neuro/Psych Psychiatric history Comments: Right hand and arm weakness Cardiovascular Hypertension: well controlled Exercise tolerance: >4 METS 
  
GI/Hepatic/Renal 
  
GERD Endo/Other Arthritis Other Findings Physical Exam 
 
Airway Mallampati: II 
TM Distance: 4 - 6 cm Neck ROM: normal range of motion Mouth opening: Normal 
 
 Cardiovascular Rhythm: regular Dental 
No notable dental hx Pulmonary Breath sounds clear to auscultation Abdominal 
GI exam deferred Other Findings Anesthetic Plan ASA: 2 Anesthesia type: general 
 
Monitoring Plan: Arterial line Anesthetic plan and risks discussed with: Patient

## 2019-01-07 NOTE — ANESTHESIA POSTPROCEDURE EVALUATION
Procedure(s): 
L3-L5 LAMI FUSION SPINE TRANSFORAMINAL LUMBAR INTERBODY FUSION (TLIF) NEED EXTRA TECH. Anesthesia Post Evaluation Multimodal analgesia: multimodal analgesia not used between 6 hours prior to anesthesia start to PACU discharge Patient location during evaluation: PACU Patient participation: complete - patient participated Level of consciousness: awake and alert Pain management: adequate Airway patency: patent Anesthetic complications: no 
Cardiovascular status: hemodynamically stable Respiratory status: acceptable Hydration status: acceptable Comments: Patient needs one unit PRBC's when available. Visit Vitals /56 Pulse 96 Temp 36.6 °C (97.9 °F) Resp 16 Ht 5' 0.25\" (1.53 m) Wt 55 kg (121 lb 3 oz) SpO2 100% BMI 23.47 kg/m²

## 2019-01-07 NOTE — BRIEF OP NOTE
BRIEF OPERATIVE NOTE Date of Procedure: 1/7/2019 Preoperative Diagnosis: Lumbar stenosis with neurogenic claudication [M48.062] Spondylolisthesis of lumbar region [M43.16] Postoperative Diagnosis: Lumbar stenosis with neurogenic claudication [M48.062] Spondylolisthesis of lumbar region [M43.16] Procedure(s): 
L3-L5 LAMI FUSION SPINE TRANSFORAMINAL LUMBAR INTERBODY FUSION (TLIF) NEED EXTRA TECH and bilateral L2 laminectomy Surgeon(s) and Role: 
   * Ricky Car MD - Primary Surgical Assistant: none Surgical Staff: 
Circ-1: Jose E Mixon RN 
Circ-Relief: Olayinka Shaffer RN; Vincent Garcia RN Radiology Technician: Galilea Osman RT, RT Fernando Scrub Tech-1: Vallorie Habermann Scrub Tech-2: Rollen East Scrub Tech-Relief: Priscila Valente Event Time In Time Out Incision Start 1226 Incision Close Anesthesia: General  
Estimated Blood Loss: 1000cc Specimens: * No specimens in log * Findings: stenoosis Complications: none Implants:  
Implant Name Type Inv. Item Serial No.  Lot No. LRB No. Used Action GRAFT DBM PTTY+ W/CHIP 10ML -- BIO DBM - [id number]  GRAFT DBM PTTY+ W/CHIP 10ML -- BIO DBM  SABINE SPINE HOW [id number] N/A 1 Implanted GRAFT DBM PTTY+ W/CHIP 10ML -- BIO DBM - [id number]  GRAFT DBM PTTY+ W/CHIP 10ML -- BIO DBM  SABINE SPINE HOW [id number] N/A 1 Implanted GRAFT BNE GRAN DBM 2-4MM 20ML -- OSTEOAMP - [id number]  GRAFT BNE GRAN DBM 2-4MM 20ML -- OSTEOAMP [id number]  N/A 1 Implanted BLOCKER SPNE CAP LCK -- CHARLES 4.5 CT - [id number]  BLOCKER SPNE CAP LCK -- CHARLES 4.5 CT  [employer] SPINE HOWM [id number] N/A 6 Implanted SCR BNE LOPEZ POLYAXL 5.5X35MM -- CHARLES 4.5 CT - [id number]  SCR BNE LOPEZ POLYAXL 5.5X35MM -- CHARLES 4.5 CT  ALTHEA [employer] SPINE HOW [id number] N/A 5 Implanted MALIK SPNE W/HEX 4.5X70MM VIT -- CHARLES 4.5 - [id number]  MALIK SPNE Lance Dennison 4.5X70MM VIT -- CHARLES 4.5  SABINE SPINE HOWM [id number] N/A 1 Implanted MALIK SPNE W/HEX 4.5X60MM VIT -- CHARLES 4.5 - [id number]  MALIK SPNE W/HEX 4.5X60MM VIT -- CHARLES 4.5  SABINE SPINE HOWM [id number] N/A 1 Implanted CONN CROSS SPNE POLYAXL MED -- CHARLES 4.5 - [id number]  CONN CROSS SPNE POLYAXL MED -- CHARLES 4.5  SABINE SPINE HOWM [id number] N/A 1 Implanted SCR BNE LOPEZ POLYAXL 5.5X45MM -- CHARLES 4.5 CT - [id number]  SCR BNE LOPEZ POLYAXL 5.5X45MM -- CHARLES 4.5 CT  [employer]HA [employer] SPINE HOWM [id number] N/A 1 Implanted

## 2019-01-08 LAB
ERYTHROCYTE [DISTWIDTH] IN BLOOD BY AUTOMATED COUNT: 14.5 % (ref 11.9–14.6)
HCT VFR BLD AUTO: 28.9 % (ref 35.8–46.3)
HGB BLD-MCNC: 9.5 G/DL (ref 11.7–15.4)
MCH RBC QN AUTO: 30.4 PG (ref 26.1–32.9)
MCHC RBC AUTO-ENTMCNC: 32.9 G/DL (ref 31.4–35)
MCV RBC AUTO: 92.3 FL (ref 79.6–97.8)
NRBC # BLD: 0 K/UL (ref 0–0.2)
PLATELET # BLD AUTO: 252 K/UL (ref 150–450)
PMV BLD AUTO: 10.4 FL (ref 9.4–12.3)
RBC # BLD AUTO: 3.13 M/UL (ref 4.05–5.2)
WBC # BLD AUTO: 11.8 K/UL (ref 4.3–11.1)

## 2019-01-08 PROCEDURE — 97530 THERAPEUTIC ACTIVITIES: CPT

## 2019-01-08 PROCEDURE — 74011250637 HC RX REV CODE- 250/637: Performed by: ORTHOPAEDIC SURGERY

## 2019-01-08 PROCEDURE — 97161 PT EVAL LOW COMPLEX 20 MIN: CPT

## 2019-01-08 PROCEDURE — P9016 RBC LEUKOCYTES REDUCED: HCPCS

## 2019-01-08 PROCEDURE — 36430 TRANSFUSION BLD/BLD COMPNT: CPT

## 2019-01-08 PROCEDURE — 85027 COMPLETE CBC AUTOMATED: CPT

## 2019-01-08 PROCEDURE — 65270000029 HC RM PRIVATE

## 2019-01-08 PROCEDURE — 36415 COLL VENOUS BLD VENIPUNCTURE: CPT

## 2019-01-08 PROCEDURE — 30233N1 TRANSFUSION OF NONAUTOLOGOUS RED BLOOD CELLS INTO PERIPHERAL VEIN, PERCUTANEOUS APPROACH: ICD-10-PCS | Performed by: ANESTHESIOLOGY

## 2019-01-08 PROCEDURE — 74011250636 HC RX REV CODE- 250/636: Performed by: ORTHOPAEDIC SURGERY

## 2019-01-08 RX ORDER — ZOLPIDEM TARTRATE 5 MG/1
10 TABLET ORAL
Status: DISCONTINUED | OUTPATIENT
Start: 2019-01-08 | End: 2019-01-10 | Stop reason: HOSPADM

## 2019-01-08 RX ADMIN — CYCLOBENZAPRINE HYDROCHLORIDE 5 MG: 10 TABLET, FILM COATED ORAL at 22:18

## 2019-01-08 RX ADMIN — Medication 5 ML: at 14:00

## 2019-01-08 RX ADMIN — BUPROPION HYDROCHLORIDE 150 MG: 150 TABLET, EXTENDED RELEASE ORAL at 08:33

## 2019-01-08 RX ADMIN — HYDROCODONE BITARTRATE AND ACETAMINOPHEN 1 TABLET: 7.5; 325 TABLET ORAL at 05:33

## 2019-01-08 RX ADMIN — MEDROXYPROGESTERONE ACETATE 2.5 MG: 10 TABLET ORAL at 08:36

## 2019-01-08 RX ADMIN — ONDANSETRON 4 MG: 2 INJECTION INTRAMUSCULAR; INTRAVENOUS at 17:41

## 2019-01-08 RX ADMIN — AMLODIPINE BESYLATE 5 MG: 5 TABLET ORAL at 08:32

## 2019-01-08 RX ADMIN — FAMOTIDINE 20 MG: 20 TABLET ORAL at 22:15

## 2019-01-08 RX ADMIN — HYDROMORPHONE HYDROCHLORIDE 1 MG: 1 INJECTION, SOLUTION INTRAMUSCULAR; INTRAVENOUS; SUBCUTANEOUS at 13:50

## 2019-01-08 RX ADMIN — HYDROCODONE BITARTRATE AND ACETAMINOPHEN 1 TABLET: 7.5; 325 TABLET ORAL at 22:15

## 2019-01-08 RX ADMIN — FAMOTIDINE 20 MG: 20 TABLET ORAL at 08:33

## 2019-01-08 RX ADMIN — LORAZEPAM 1 MG: 1 TABLET ORAL at 14:25

## 2019-01-08 RX ADMIN — VITAMIN D, TAB 1000IU (100/BT) 5000 UNITS: 25 TAB at 08:33

## 2019-01-08 RX ADMIN — Medication 10 ML: at 20:03

## 2019-01-08 RX ADMIN — Medication 1 AMPULE: at 09:00

## 2019-01-08 RX ADMIN — PANTOPRAZOLE SODIUM 40 MG: 40 TABLET, DELAYED RELEASE ORAL at 05:33

## 2019-01-08 RX ADMIN — GABAPENTIN 100 MG: 100 CAPSULE ORAL at 05:33

## 2019-01-08 RX ADMIN — ONDANSETRON 4 MG: 2 INJECTION INTRAMUSCULAR; INTRAVENOUS at 12:13

## 2019-01-08 RX ADMIN — MELOXICAM 7.5 MG: 7.5 TABLET ORAL at 08:32

## 2019-01-08 RX ADMIN — Medication 2 G: at 03:29

## 2019-01-08 RX ADMIN — GABAPENTIN 100 MG: 100 CAPSULE ORAL at 13:50

## 2019-01-08 RX ADMIN — ONDANSETRON 4 MG: 2 INJECTION INTRAMUSCULAR; INTRAVENOUS at 08:30

## 2019-01-08 RX ADMIN — Medication 5 ML: at 05:33

## 2019-01-08 RX ADMIN — Medication 2 G: at 12:13

## 2019-01-08 RX ADMIN — ZOLPIDEM TARTRATE 10 MG: 5 TABLET ORAL at 22:15

## 2019-01-08 RX ADMIN — HYDROCODONE BITARTRATE AND ACETAMINOPHEN 1 TABLET: 7.5; 325 TABLET ORAL at 09:41

## 2019-01-08 RX ADMIN — DEXTROSE MONOHYDRATE, SODIUM CHLORIDE, AND POTASSIUM CHLORIDE 75 ML/HR: 50; 4.5; 1.49 INJECTION, SOLUTION INTRAVENOUS at 03:21

## 2019-01-08 RX ADMIN — VALSARTAN 160 MG: 160 TABLET, FILM COATED ORAL at 08:34

## 2019-01-08 RX ADMIN — Medication 1 AMPULE: at 22:19

## 2019-01-08 RX ADMIN — ESTROGENS, CONJUGATED 1.25 MG: 0.62 TABLET, FILM COATED ORAL at 08:34

## 2019-01-08 RX ADMIN — BUPROPION HYDROCHLORIDE 150 MG: 150 TABLET, EXTENDED RELEASE ORAL at 22:15

## 2019-01-08 RX ADMIN — Medication 5 ML: at 22:15

## 2019-01-08 RX ADMIN — GABAPENTIN 100 MG: 100 CAPSULE ORAL at 22:15

## 2019-01-08 RX ADMIN — Medication 2 G: at 20:02

## 2019-01-08 RX ADMIN — HYDROCODONE BITARTRATE AND ACETAMINOPHEN 1 TABLET: 7.5; 325 TABLET ORAL at 17:40

## 2019-01-08 NOTE — PROGRESS NOTES
01/07/19 2000 Dual Skin Pressure Injury Assessment Dual Skin Pressure Injury Assessment WDL Second Care Provider (Based on [address] [place] [address]) [facility] Skin Integumentary Skin Integumentary (WDL) WDL Skin Condition/Temp Warm;Dry Skin Color Pale Skin Integrity Incision (comment) (lower back) Primary Nurse West Davies, RN performed a dual skin assessment on this patient No impairment noted Sven score is 23

## 2019-01-08 NOTE — PROGRESS NOTES
Physical Therapy Note: Attempted physical therapy treatment this PM, patient c/o severe nausea, headache, neck pain, sitting on edge of bed requesting help to return to supine. Reports she does not feel like she can participate in therapy this PM due to feeling poorly. Required cues to perform log roll back to supine. RN notified. Will attempt tomorrow as patient is able. Thank you, ROSA ELENA PickettT

## 2019-01-08 NOTE — OP NOTES
[facility] REPORT    Reyna Vo  MR#: [id number]  : 1955  ACCOUNT #: [de-identified]   DATE OF SERVICE: 2019    SURGEON:  Lima Sharma MD    ASSISTANT:  None. PREOPERATIVE DIAGNOSIS:  Multilevel lumbar spinal stenosis with L3-4 and L4-5 grade I spondylolisthesis. POSTOPERATIVE DIAGNOSIS:  Multilevel lumbar spinal stenosis with L3-4 and L4-5 grade I spondylolisthesis. PROCEDURES PERFORMED:  1.  Bilateral L2-3 laminectomy, partial facetectomy, foraminotomy, and no interbody fusion was done at this level. 2.  Right-sided L3-4 and 4-5 laminectomy, partial facetectomy, foraminotomy to decompress the right side of the spine where her interbody devices were placed on the left side with a decompression just done enough to allow placement of the interbody devices and therefore the right side of the spine was not decompressed by placement of the interbody device. 3.  L3-4 and L4-5 posterior lumbar interbody fusion with the Ancelmo Tritanium lordotic interbody devices placed on the left side with a laminectomy just sufficient to allow placement of the interbody device and not for decompression of the right side of the spine. 4.  L3-4 and L4-5 posterior lumbar interbody fusion. 5.  L3-L5 posterolateral fusion. 6.  Placement of segmental spinal instrumentation L3-L5 using the Ancelmo cortical screw and martín system. 7.  Left iliac crest bone marrow aspiration. 8.  Use of local bone graft collected during the laminectomy decompression procedure that was run through a bone mill for bone graft, in addition OsteoAMP and demineralized bone matrix were used. 9.  Left iliac crest bone marrow aspiration. ANESTHESIA:  GETA. ESTIMATED BLOOD LOSS:  5000 mL. FLUIDS:  2 liters crystalloid and 1 unit of albumin. DRAINS:  One large Hemovac. SPECIMENS REMOVED:  None. IMPLANTS:  Gilman. COMPLICATIONS:  None.     INDICATIONS:  The patient is a 68-year-old female with chronic low back and leg pain limited walking, claudication symptoms that have been worsening over time. She has had previous cervical spinal stenosis requiring surgery and at this point, the lumbar problem had progressed to the point that she needed surgery, so she is brought for surgical treatment following failure of conservative measures. PROCEDURE:  The patient was brought to the operating room. After administration of anesthesia, IV antibiotic and placement of monitoring lines, she was positioned prone on the Doren Vishnu frame. Her back was prepped with Betadine and sterilely draped. At this point, surgeon called a timeout and confirmed the procedure to be performed, her allergy history and the fact that she had received her preoperative IV antibiotics. At this point, C-arm was brought in. We identified the L3-4 and L4-5 levels, marked them on the skin, made a midline incision over this area and dissected down through the subcutaneous tissue with electrocautery to the deep fascia. Deep fascia was incised on either side of the spinous processes and we dissected down along the lamina out the facet joint. We placed angle curet in the interlaminar space and took lateral C-arm x-ray to confirm our location. Once we had done that, we dissected out the lateral gutter exposing the L3, 4 and 5 transverse processes and down to the membrane in between, removed all the loose soft tissue bilaterally. We did have to osteotomize the L3-4 and L4-5 facet joints because they were markedly hypertrophied and could not visualize posterior to this. After we had exposed the transverse processes, we were ready to do our decompression. The spinous processes of L3, L4 and L5 were removed with a rongeur, run through the bone mill for local bone graft.   We then burred down the lamina with a high speed bur and then rongeured off the dorsal bone which was also used for bone graft after running through a bone mill. We then scraped the ligamentum off the bone with an angled curet and did use Kerrisons to perform bilateral L3, 4, 5 laminectomy, partial facetectomy, foraminotomy. The ligamentum was extremely thickened at L4-5 causing significant stenosis. After we had decompressed the spine, the thecal sac expanded nicely. The patient had some stenosis at L2-3 level. We were only planning to fuse L3-5 so we did a laminectomy L2-3 trying to minimize resection of the facet joint. We removed all the ligamentum flavum and hopefully this will prevent progressive stenosis above a fusion. Once we completed that, the spinal canal was quite free all the way up to the top of L2. There was a lot of venous oozing during this time that we tried to cauterize as much as possible. We elected to place interbody devices at L3-4 and L4-L5. We placed them from the left side, so we burred out the medial aspect of the facet joints at both levels bilaterally retracting neural structures, cauterized the veins, retracted them, incised the disk annulus, removed this with pituitaries, used the distractors and xavi and even the angled curettes to remove disk from the disk space and we used the trials to try to determine our appropriate sizing at L4-5. A 10 mm interbody device was appropriate size and at L3-4 a 9 mm interbody device trial was the appropriate size, so the Tritanium interbody devices of 10 and 9 mm in height lordotic 4 degrees were obtained, packed full of local bone graft and OsteoAMP. We harvested bone marrow aspirate from the left iliac crest prior to this by inserting the needle in the crest, removing the stylet and suctioning off 15 mL of bone marrow aspirate, which was mixed with the local bone graft that had been run through a bone mill and the OsteoAMP.   The bone graft gun was packed with 2.5 mL of this and injected into the disk space, tamped anteriorly and at L4-5 from the left side, we inserted the 10 mm tall lordotic interbody device and at L3-4 we inserted the 9 mm lordotic interbody device and final seating was done under C-arm viewing. Once that was done, we irrigated the wound thoroughly, tried to define any bleeders as possible and cauterized them with bipolar. We placed FloSeal in the lateral gutters to help maintain hemostasis. We had packed off the lateral gutters. At this point, we were ready to place our instrumentation. We used the bur to saúl down the   facet joints and then we palpated the pedicles and determined our appropriate starting point at L3, 4 and 5 bilaterally and burred a starting hole and then used a 4.5 drill to drill our cortical screw holes, then tapped it with a 5.5 mm tap and we used the nerve stimulation to check the screw hole placement and none stimulated below 10 milliamps. We inserted five 35 mm length screws except at L4 on the left. We placed a 45 length screw because the bone in this area had been resected and we needed the screws to stand up taller. Once all the screws were placed, we tested them with nerve stimulation and none stimulated below 10 milliamps. We then took final AP, lateral C-arm x-ray that showed good position of the screws and interbody cages. We then placed rods of appropriate length with the locking nuts and then we compressed across the L3-4 and L4-5 level tightening the locking nuts as we went and then used a torque wrench to do final tightening. We irrigated the wound again at this point, then we decorticated the transverse processes and lateral bony margins bilaterally with a high speed bur. The local bone graft OsteoAMP combination was packed in both lateral gutters and demineralized bone matrix putty was placed on top of this and pressed into place. We elected to place a cross connector from right to left rods and we placed this between the L4 and L5 screws and secured it to the martín bilaterally.   At this point, we checked the foramen again to make sure everything was free and made sure all the ligamentum had been resected and then we were completed with the surgery, just needed to close the wound. A gram of vancomycin powder was placed in the wound and lateral gutters. Large Hemovac drain was placed deep in the wound, brought out through a separate proximal stab incision, the deep fascia was closed over this with interrupted figure-of-eight stitches of #1 Vicryl. Subcutaneous tissue was closed over this with interrupted stitches of 2-0 Vicryl, skin was closed with a running subcuticular stitch of 3-0 Vicryl. Steri-Strips and dry sterile dressings were applied. The patient was then rolled supine onto the recovery room bed, having tolerated the procedure well.       Lawson Harry III, MD SEL / MN  D: 01/07/2019 17:05     T: 01/07/2019 22:44  JOB #: [id number]  CC: Betsy Xiao MD

## 2019-01-08 NOTE — PROGRESS NOTES
TRANSFER - IN REPORT: 
 
Verbal report received from ZULEYKA Frank on Briana Hdez  being received from PACU for routine progression of care Report consisted of patients Situation, Background, Assessment and  
Recommendations(SBAR). Information from the following report(s) SBAR and OR Summary was reviewed with the receiving nurse. Opportunity for questions and clarification was provided. Assessment completed upon patients arrival to unit and care assumed.

## 2019-01-08 NOTE — PROGRESS NOTES
ORTHO PROGRESS NOTE 2019 Admit Date: 2019 Admit Diagnosis: Lumbar stenosis with neurogenic claudication [M48.062] Spondylolisthesis of lumbar region [M43.16] Post Op day: 1 Day Post-Op Subjective:  
 
Jose Guadalupe Narvaez is a patient who is now 1 Day Post-Op  and has no complaints. Objective:  
 
PT/OT:  
  
 
Vital Signs:   
Patient Vitals for the past 8 hrs: 
 BP Temp Pulse Resp SpO2  
19 1345 179/81  61 18 96 % 19 1200 122/68 98.6 °F (37 °C) 96 16 98 % Temp (24hrs), Av.2 °F (36.8 °C), Min:96.3 °F (35.7 °C), Max:99.7 °F (37.6 °C) LAB:   
Recent Labs 19 
0522 HGB 9.5* WBC 11.8*  
 I/O: 
 6490 -  1900 In: -  
Out: 260 [Drains:260]  1901 -  0700 In: 3263.5 [I.V.:2501] Out: 3897 [Urine:1400; YNWTLB:058] Physical Exam: 
 
Awake and in no acute distress. Mood and affect appropriate. Respirations unlabored and no evidence cyanosis. Calves nontender. Abdomen soft and nontender. Dressing clean/dry No new neurologic deficit. Assessment:  
  
Patient Active Problem List  
Diagnosis Code  Hypertension I10  Fatigue R53.83  Reflux esophagitis K21.0  Neck pain M54.2  Sinus congestion R09.81  
 Microhematuria R31.29  
 B12 deficiency E53.8  Renal cyst, congenital, right Q61.00  Depression with anxiety F41.8  Hx of fracture of pelvis Z87.81  Back pain M54.9  Post-viral cough syndrome R05  Chronic narcotic use F11.90  
 Other chronic pain G89.29  Spinal stenosis of lumbar region with neurogenic claudication M48.062  Radiculopathy affecting upper extremity M54.10  Stenosis of cervical spine with myelopathy M47.12  Spinal stenosis M48.00  
 
 
1 Day Post-Op STATUS POST Procedure(s): 
L3-L5 LAMI FUSION SPINE TRANSFORAMINAL LUMBAR INTERBODY FUSION (TLIF) NEED EXTRA [facility] Plan:  
 
Continue PT/OT/Rehab Anticipate discharge to: HOME next 2 days Signed By: Jalil Taylor MD

## 2019-01-08 NOTE — PROGRESS NOTES
Problem: Mobility Impaired (Adult and Pediatric) Goal: *Acute Goals and Plan of Care (Insert Text) STG: 
(1.)Ms. Elizabeth Valdes will move from supine to sit and sit to supine , scoot up and down and roll side to side with SUPERVISION within 3 treatment day(s). (2.)Ms. Elizabeth Valdes will transfer from bed to chair and chair to bed with STAND BY ASSIST using the least restrictive device within 3 treatment day(s). (3.)Ms. Elizabeth Valdes will ambulate with STAND BY ASSIST for 250 feet with the least restrictive device within 3 treatment day(s). (4.)Ms. Brandon Kent will perform standing static and dynamic balance activities x 15 minutes with STAND BY ASSIST to improve safety within 3 day(s). (5.)Ms. Elizabeth Valdes will maintain spinal precautions throughout all functional mobility within 3 days with 0 verbal cues. (6.)Ms. Elizabeth Valdes will don spinal brace independently within 3 days to improve independence with functional mobility. LTG: 
(1.)Ms. Elizabeth Valdes will move from supine to sit and sit to supine , scoot up and down and roll side to side in bed with MODIFIED INDEPENDENCE within 7 treatment day(s). (2.)Ms. Elizabeth Valdes will transfer from bed to chair and chair to bed with MODIFIED INDEPENDENCE using the least restrictive device within 7 treatment day(s). (3.)Ms. Elizabeth Valdes will ambulate with MODIFIED INDEPENDENCE for 500 feet with the least restrictive device within 7 treatment day(s). (4.)Ms. Elizabeth Valdes will perform standing static and dynamic balance activities x 15 minutes with MODIFIED INDEPENDENCE to improve safety within 7 day(s). (5.)Ms. Elizabeth Valdes will ascend and descend 2+ stairs using 0-1 hand rail(s) with MODIFIED INDEPENDENCE to improve functional mobility and safety within 7 day(s). ________________________________________________________________________________________________ PHYSICAL THERAPY: Initial Assessment, Daily Note, AM 1/8/2019INPATIENT: Hospital Day: 2 [place] / Product Type: Medicare /  
Spinal Brace when ambulating or sitting up NAME/AGE/GENDER: Curtis Mcnulty is a 70 y.o. female PRIMARY DIAGNOSIS: Lumbar stenosis with neurogenic claudication [M48.062] Spondylolisthesis of lumbar region [M43.16] Spinal stenosis of lumbar region with neurogenic claudication Spinal stenosis of lumbar region with neurogenic claudication Procedure(s) (LRB): 
L3-L5 LAMI FUSION SPINE TRANSFORAMINAL LUMBAR INTERBODY FUSION (TLIF) NEED EXTRA TECH (N/A) 1 Day Post-Op ICD-10: Treatment Diagnosis: · Difficulty in walking, Not elsewhere classified (R26.2) Precaution/Allergies: 
Latex, natural rubber; Celebrex [celecoxib]; and Mirtazapine ASSESSMENT:  
Ms. Gloria Garcia is a 70 y.o. female admitted s/p above surgery. She lives with spouse in a 2 story home on the main level (spouse unable to assist with mobility for patient due to [place] issues. \") She is typically independent with ambulation, ADLs, driving, and recently had cervical surgery in late 2018. She plans to hire a home care aide to assist her with driving and feeding her cats at discharge. She is supine on contact and agreeable to physical therapy evaluation and treatment. Educated on spinal precautions and log roll technique and able to perform with stand by assist. Noted c/o nausea in sitting. BLE 4+/5 strength and intact sensation to light touch L2-S1, patient reports LLE radicular pain prior to surgery. She stood with CGA and verbal cues for maintaining spinal precautions and donned spinal brace with minimal assist and cues. C/o lightheadedness in standing, and returned to sitting.  Treatment initiated to improve activity tolerance with mobility and BP taken in sitting, found to be 142/89  bpm. She stood, again requiring cues for technique and BP found to be 122/71  bpm with positive signs/symptoms of orthostatic hypotension. She took side steps with rolling walker and returned to sitting with CGA. Improvement with sitting, able to tolerate edge of bed x5', removed spinal brace and returned to supine with verbal cues and stand by assistance. Did not attempt ambulation away from bed due to patient's symptoms this AM and RN notified of BP and nausea. Milton Gonzalez is currently functioning below her baseline and would benefit from skilled PT during acute care stay to maximize safety and independence with functional mobility. This section established at most recent assessment PROBLEM LIST (Impairments causing functional limitations): 1. Decreased Strength 2. Decreased ADL/Functional Activities 3. Decreased Transfer Abilities 4. Decreased Ambulation Ability/Technique 5. Decreased Balance 6. Increased Pain 7. Decreased Activity Tolerance 8. Decreased Knowledge of Precautions 9. Decreased [place] with Home Exercise Program 
 INTERVENTIONS PLANNED: (Benefits and precautions of physical therapy have been discussed with the patient.) 1. Balance Exercise 2. Bed Mobility 3. Family Education 4. Gait Training 5. Group Therapy 6. Home Exercise Program (HEP) 7. Therapeutic Activites 8. Therapeutic Exercise/Strengthening 9. Transfer Training 10. Patient Education TREATMENT PLAN: Frequency/Duration: twice daily for duration of hospital stay Rehabilitation Potential For Stated Goals: Excellent RECOMMENDED REHABILITATION/EQUIPMENT: (at time of discharge pending progress): Due to the probability of continued deficits (see above) this patient will not likely need continued skilled physical therapy after discharge. Equipment:  
? None at this time HISTORY:  
History of Present Injury/Illness (Reason for Referral): Recheck. The patient is 70years-old. She had an old MRI scan that showed problems in her back with stenosis and also spondylolisthesis.    It was over a year old so we got a new one and I am reviewing it with her. She has pretty significant spinal stenosis at L3-4 and L4-5 and spondylolisthesis L4-5 and a severely degenerated L3-4 disc. Up above, it looks pretty good. She has a little disc bulging at L5-S1, but I do not think that is of significance. She has claudication symptoms, back and leg pain. It hurts when she stands and walks; gets better sitting. She has failed injections that used to work for her. Past Medical History/Comorbidities: Ms. Tyrel Patel  has a past medical history of Abnormal Papanicolaou smear of cervix, Anemia, Arthritis, Bowel trouble, Chronic pain, Depression, Elective , Fatigue, GERD (gastroesophageal reflux disease), Hypertension, Menopause, Miscarriage, Neck pain, Pain in breast, Pelvis fracture, right ([address] Psychiatric disorder, Reflux esophagitis, and Sinus congestion. Ms. Tyrel Patel  has a past surgical history that includes us guided core breast biopsy (); hx colonoscopy (2009); hx endoscopy; hx orthopaedic (Left, ); hx cataract removal (Bilateral, ); pr neurological procedure unlisted (10/17/2018); SPINE ANTERIOR CERVICAL DISCECTOMY WITH FUSION C6-7  SSEP NEURO MONITOR (N/A, 10/17/2018); and SPINE POSTERIOR CERVICAL LAMINECTOMY, FUSION WITH ALLOGRAFT AND INSTRUMENTATION C3-4 (N/A, 10/17/2018). Social History/Living Environment:  
Home Environment: Private residence # Steps to Enter: 2 Rails to Enter: No 
One/Two Story Residence: Two story, live on 1st floor Living Alone: No 
Support Systems: Spouse/Significant Other/Partner Patient Expects to be Discharged to[de-identified] Private residence Current DME Used/Available at Home: Walker, rolling, Brace/Splint Prior Level of Function/Work/Activity: She lives with spouse in a 2 story home on the main level (spouse unable to assist with mobility for patient due to [place] issues. \") She is typically independent with ambulation, ADLs, driving, and recently had cervical surgery in late 2018. She plans to hire a home care aide to assist her with driving and feeding her cats at discharge. Number of Personal Factors/Comorbidities that affect the Plan of Care: 3+: HIGH COMPLEXITY EXAMINATION:  
Most Recent Physical Functioning:  
Gross Assessment: 
AROM: Within functional limits PROM: Within functional limits Strength: Generally decreased, functional 
Coordination: Within functional limits Tone: Normal 
Sensation: Intact(R hand numbness (reports is from cervical spine nerve injury) Posture: 
Posture (WDL): Exceptions to [facility] Posture Assessment: Forward head Balance: 
Sitting: Intact Standing: Impaired Standing - Static: Good Standing - Dynamic : Fair Bed Mobility: 
Rolling: Stand-by assistance Supine to Sit: Stand-by assistance Sit to Supine: Stand-by assistance Scooting: Stand-by assistance Wheelchair Mobility: 
  
Transfers: 
Sit to Stand: Contact guard assistance Stand to Sit: Contact guard assistance Gait: 
  
Base of Support: Narrowed; Center of gravity altered Speed/Erma: Slow Step Length: Right shortened;Left shortened Gait Abnormalities: Decreased step clearance; Path deviations Distance (ft): 2 Feet (ft)(side steps at edge of bed with c/o lightheadedness) Assistive Device: Walker, rolling;Brace/Splint Ambulation - Level of Assistance: Contact guard assistance Interventions: Safety awareness training;Manual cues; Visual/Demos; Verbal cues Body Structures Involved: 1. Nerves 2. Bones 3. Joints 4. Muscles 5. Ligaments Body Functions Affected: 1. Sensory/Pain 2. Neuromusculoskeletal 
3. Movement Related Activities and Participation Affected: 1. Mobility 2. Self Care 3. Domestic Life 4. Interpersonal Interactions and Relationships 5. Community, Social and [place] Number of elements that affect the Plan of Care: 4+: HIGH COMPLEXITY CLINICAL PRESENTATION:  
Presentation: Stable and uncomplicated: LOW COMPLEXITY CLINICAL DECISION MAKING:  
[facility] AM-PAC 6 Clicks Basic Mobility Inpatient Short Form How much difficulty does the patient currently have. .. Unable A Lot A Little None 1. Turning over in bed (including adjusting bedclothes, sheets and blankets)? [] 1   [] 2   [] 3   [x] 4  
2. Sitting down on and standing up from a chair with arms ( e.g., wheelchair, bedside commode, etc.)   [] 1   [] 2   [x] 3   [] 4  
3. Moving from lying on back to sitting on the side of the bed? [] 1   [] 2   [] 3   [x] 4 How much help from another person does the patient currently need. .. Total A Lot A Little None 4. Moving to and from a bed to a chair (including a wheelchair)? [] 1   [] 2   [x] 3   [] 4  
5. Need to walk in hospital room? [] 1   [x] 2   [] 3   [] 4  
6. Climbing 3-5 steps with a railing? [] 1   [x] 2   [] 3   [] 4  
© 2007, Trustees of [facility], under license to [employer]. All rights reserved Score:  Initial: 18 Most Recent: X (Date: -- ) Interpretation of Tool:  Represents activities that are increasingly more difficult (i.e. Bed mobility, Transfers, Gait). Score 24 23 22-20 19-15 14-10 9-7 6 Modifier CH CI CJ CK CL CM CN   
 
? Mobility - Walking and Moving Around:  
  - CURRENT STATUS: CK - 40%-59% impaired, limited or restricted  - GOAL STATUS: CI - 1%-19% impaired, limited or restricted  - D/C STATUS:  ---------------To be determined--------------- [place] / [place] B / Product Type: Medicare /   
 
Medical Necessity:    
· Patient demonstrates excellent rehab potential due to higher previous functional level. Reason for Services/Other Comments: 
· Patient continues to require modification of therapeutic interventions to increase complexity of exercises. Use of outcome tool(s) and clinical judgement create a POC that gives a: Clear prediction of patient's progress: LOW COMPLEXITY  
  
 
 
 
TREATMENT:  
(In addition to Assessment/Re-Assessment sessions the following treatments were rendered) Pre-treatment Symptoms/Complaints:  none Pain: Initial:  
Pain Intensity 1: 6  Post Session:  6/10 Therapeutic Activity: (    8 minutes): Therapeutic activities including Ambulation on level ground and sitting on edge of bed to improve mobility, strength, balance and activity tolerance. Required minimal Safety awareness training;Manual cues; Visual/Demos; Verbal cues to promote static and dynamic balance in standing. Braces/Orthotics/Lines/Etc:  
· O2 Room Air Treatment/Session Assessment:   
· Response to Treatment:  Patient experienced lightheadedness and decreased BP in standing with mobility. · Interdisciplinary Collaboration:  
o Physical Therapist 
o Registered Nurse · After treatment position/precautions:  
o Supine in bed 
o Bed/Chair-wheels locked 
o Bed in low position 
o Call light within reach 
o RN notified · Compliance with Program/Exercises: Will assess as treatment progresses · Recommendations/Intent for next treatment session: \"Next visit will focus on advancements to more challenging activities and reduction in assistance provided\". Total Treatment Duration: PT Patient Time In/Time Out Time In: 2423 Time Out: 1120 Alda Swartz DPT

## 2019-01-08 NOTE — CDMP QUERY
Please clarify if this patient is being treated/managed for: 
  
=> Acute Blood Loss Anemia 
=>Post op Hemorrhagic Anemia 
=>Anemia or other The medical record reflects the following: 
  
Risk Factors: s/p L3 to L5 laminectomy and fusion Clinical Indicators: Preop hgb of 11.2 with post op Hgb of 6.1 and 5000 ml documented estimated loss of blood in OR notes. Treatment: Serial lab monitoring, transfuse 2 units of packed RBC's Please clarify and document your clinical opinion in the progress notes and discharge summary including the definitive and/or presumptive diagnosis, (suspected or probable), related to the above clinical findings. Please include clinical findings supporting your diagnosis. Thanks, Chaparro Reyes RN Compliant Documentation Management Program 
[phone]

## 2019-01-08 NOTE — ADVANCED PRACTICE NURSE
Post anesthesia rounds: 
Pt states persistent nausea, medications and sips of ginger ale help. Pain managed by pain meds. Pt states no anesthesia concerns. Edenilson Keating CRNA

## 2019-01-09 PROBLEM — D62 ACUTE BLOOD LOSS AS CAUSE OF POSTOPERATIVE ANEMIA: Status: ACTIVE | Noted: 2019-01-09

## 2019-01-09 LAB
ABO + RH BLD: NORMAL
BLD PROD TYP BPU: NORMAL
BLD PROD TYP BPU: NORMAL
BLOOD GROUP ANTIBODIES SERPL: NORMAL
BPU ID: NORMAL
BPU ID: NORMAL
CROSSMATCH RESULT,%XM: NORMAL
CROSSMATCH RESULT,%XM: NORMAL
ERYTHROCYTE [DISTWIDTH] IN BLOOD BY AUTOMATED COUNT: 15.3 % (ref 11.9–14.6)
HCT VFR BLD AUTO: 26.2 % (ref 35.8–46.3)
HGB BLD-MCNC: 8.6 G/DL (ref 11.7–15.4)
MCH RBC QN AUTO: 30 PG (ref 26.1–32.9)
MCHC RBC AUTO-ENTMCNC: 32.8 G/DL (ref 31.4–35)
MCV RBC AUTO: 91.3 FL (ref 79.6–97.8)
NRBC # BLD: 0 K/UL (ref 0–0.2)
PLATELET # BLD AUTO: 263 K/UL (ref 150–450)
PMV BLD AUTO: 10.2 FL (ref 9.4–12.3)
RBC # BLD AUTO: 2.87 M/UL (ref 4.05–5.2)
SPECIMEN EXP DATE BLD: NORMAL
STATUS OF UNIT,%ST: NORMAL
STATUS OF UNIT,%ST: NORMAL
UNIT DIVISION, %UDIV: 0
UNIT DIVISION, %UDIV: 0
WBC # BLD AUTO: 11.7 K/UL (ref 4.3–11.1)

## 2019-01-09 PROCEDURE — 36415 COLL VENOUS BLD VENIPUNCTURE: CPT

## 2019-01-09 PROCEDURE — 74011250637 HC RX REV CODE- 250/637: Performed by: ORTHOPAEDIC SURGERY

## 2019-01-09 PROCEDURE — 85027 COMPLETE CBC AUTOMATED: CPT

## 2019-01-09 PROCEDURE — 65270000029 HC RM PRIVATE

## 2019-01-09 PROCEDURE — 97530 THERAPEUTIC ACTIVITIES: CPT

## 2019-01-09 RX ADMIN — ESTROGENS, CONJUGATED 1.25 MG: 0.62 TABLET, FILM COATED ORAL at 08:09

## 2019-01-09 RX ADMIN — CYCLOBENZAPRINE HYDROCHLORIDE 5 MG: 10 TABLET, FILM COATED ORAL at 19:35

## 2019-01-09 RX ADMIN — MAGNESIUM HYDROXIDE 30 ML: 400 SUSPENSION ORAL at 08:09

## 2019-01-09 RX ADMIN — PANTOPRAZOLE SODIUM 40 MG: 40 TABLET, DELAYED RELEASE ORAL at 05:21

## 2019-01-09 RX ADMIN — Medication 10 ML: at 05:21

## 2019-01-09 RX ADMIN — BUPROPION HYDROCHLORIDE 150 MG: 150 TABLET, EXTENDED RELEASE ORAL at 08:08

## 2019-01-09 RX ADMIN — VITAMIN D, TAB 1000IU (100/BT) 5000 UNITS: 25 TAB at 08:08

## 2019-01-09 RX ADMIN — Medication 1 AMPULE: at 21:07

## 2019-01-09 RX ADMIN — AMLODIPINE BESYLATE 5 MG: 5 TABLET ORAL at 08:08

## 2019-01-09 RX ADMIN — Medication 1 AMPULE: at 08:09

## 2019-01-09 RX ADMIN — GABAPENTIN 100 MG: 100 CAPSULE ORAL at 05:21

## 2019-01-09 RX ADMIN — Medication 10 ML: at 14:07

## 2019-01-09 RX ADMIN — GABAPENTIN 100 MG: 100 CAPSULE ORAL at 14:06

## 2019-01-09 RX ADMIN — ZOLPIDEM TARTRATE 10 MG: 5 TABLET ORAL at 21:07

## 2019-01-09 RX ADMIN — CYCLOBENZAPRINE HYDROCHLORIDE 5 MG: 10 TABLET, FILM COATED ORAL at 08:10

## 2019-01-09 RX ADMIN — HYDROCODONE BITARTRATE AND ACETAMINOPHEN 1 TABLET: 7.5; 325 TABLET ORAL at 19:35

## 2019-01-09 RX ADMIN — VALSARTAN 160 MG: 160 TABLET, FILM COATED ORAL at 08:09

## 2019-01-09 RX ADMIN — HYDROCODONE BITARTRATE AND ACETAMINOPHEN 1 TABLET: 7.5; 325 TABLET ORAL at 05:21

## 2019-01-09 RX ADMIN — FAMOTIDINE 20 MG: 20 TABLET ORAL at 21:07

## 2019-01-09 RX ADMIN — HYDROCODONE BITARTRATE AND ACETAMINOPHEN 1 TABLET: 7.5; 325 TABLET ORAL at 14:06

## 2019-01-09 RX ADMIN — MEDROXYPROGESTERONE ACETATE 2.5 MG: 10 TABLET ORAL at 08:08

## 2019-01-09 RX ADMIN — FAMOTIDINE 20 MG: 20 TABLET ORAL at 08:09

## 2019-01-09 RX ADMIN — MELOXICAM 7.5 MG: 7.5 TABLET ORAL at 08:08

## 2019-01-09 RX ADMIN — HYDROCODONE BITARTRATE AND ACETAMINOPHEN 1 TABLET: 7.5; 325 TABLET ORAL at 09:54

## 2019-01-09 RX ADMIN — BUPROPION HYDROCHLORIDE 150 MG: 150 TABLET, EXTENDED RELEASE ORAL at 21:07

## 2019-01-09 RX ADMIN — GABAPENTIN 100 MG: 100 CAPSULE ORAL at 21:07

## 2019-01-09 RX ADMIN — Medication 5 ML: at 21:16

## 2019-01-09 NOTE — PROGRESS NOTES
ORTHO PROGRESS NOTE 2019 Admit Date: 2019 Admit Diagnosis: Lumbar stenosis with neurogenic claudication [M48.062] Spondylolisthesis of lumbar region [M43.16] Post Op day: 2 Days Post-Op Subjective:  
 
Kelly Li is a patient who is now 2 Days Post-Op  and has no complaints. Objective:  
 
PT/OT:  
Progressing well Vital Signs:   
Patient Vitals for the past 8 hrs: 
 BP Temp Pulse Resp SpO2  
19 1452 114/68 98.1 °F (36.7 °C) 97 18 98 % 19 1106 121/73 98 °F (36.7 °C) 97 18 97 % Temp (24hrs), Av.2 °F (36.8 °C), Min:97.8 °F (36.6 °C), Max:98.6 °F (37 °C) LAB:   
Recent Labs 19 
1359 HGB 8.6* WBC 11.7*  I/O: 
 1394 -  1900 In: 5 [P.O.:420] Out: 100 [Drains:100]  1901 -  0700 In: 762.5 Out: 1620 [Urine:1000; Drains:620] Physical Exam: 
 
Awake and in no acute distress. Mood and affect appropriate. Respirations unlabored and no evidence cyanosis. Calves nontender. Abdomen soft and nontender. Dressing clean/dry No new neurologic deficit. Assessment:  
  
Patient Active Problem List  
Diagnosis Code  Hypertension I10  Fatigue R53.83  Reflux esophagitis K21.0  Neck pain M54.2  Sinus congestion R09.81  
 Microhematuria R31.29  
 B12 deficiency E53.8  Renal cyst, congenital, right Q61.00  Depression with anxiety F41.8  Hx of fracture of pelvis Z87.81  Back pain M54.9  Post-viral cough syndrome R05  Chronic narcotic use F11.90  
 Other chronic pain G89.29  Spinal stenosis of lumbar region with neurogenic claudication M48.062  Radiculopathy affecting upper extremity M54.10  Stenosis of cervical spine with myelopathy M47.12  Spinal stenosis M48.00  
 
 
2 Days Post-Op STATUS POST Procedure(s): 
L3-L5 LAMI FUSION SPINE TRANSFORAMINAL LUMBAR INTERBODY FUSION (TLIF) NEED EXTRA [facility] 
 
 Acute blood loss anemia due to intraoperative blood loss of 1000 cc and post operative drain output of 810 cc. Plan:  
 
Continue PT/OT Drain out tomorrow. Home tomorrow.  
 
 
Signed By: Ashley Reinoso MD

## 2019-01-09 NOTE — PROGRESS NOTES
Problem: Interdisciplinary Rounds Goal: Interdisciplinary Rounds Outcome: Progressing Towards Goal 
Interdisciplinary team rounds were held 1/9/2019 with the following team members:Care Management, Physical Therapy and . Anticipate discharge home tomorrow. Plan of care discussed. See clinical pathway and/or care plan for interventions and desired outcomes.

## 2019-01-09 NOTE — PROGRESS NOTES
Problem: Mobility Impaired (Adult and Pediatric) Goal: *Acute Goals and Plan of Care (Insert Text) STG: 
(1.)Ms. Harriet Leija will move from supine to sit and sit to supine , scoot up and down and roll side to side with SUPERVISION within 3 treatment day(s). (2.)Ms. Harriet Leija will transfer from bed to chair and chair to bed with STAND BY ASSIST using the least restrictive device within 3 treatment day(s). (3.)Ms. Harriet Leija will ambulate with STAND BY ASSIST for 250 feet with the least restrictive device within 3 treatment day(s). (4.)Ms. Lee Lopez will perform standing static and dynamic balance activities x 15 minutes with STAND BY ASSIST to improve safety within 3 day(s). (5.)Ms. Harriet Leija will maintain spinal precautions throughout all functional mobility within 3 days with 0 verbal cues. (6.)Ms. Harriet Leija will don spinal brace independently within 3 days to improve independence with functional mobility. LTG: 
(1.)Ms. Harriet Leija will move from supine to sit and sit to supine , scoot up and down and roll side to side in bed with MODIFIED INDEPENDENCE within 7 treatment day(s). (2.)Ms. Harriet Leija will transfer from bed to chair and chair to bed with MODIFIED INDEPENDENCE using the least restrictive device within 7 treatment day(s). (3.)Ms. Harriet Leija will ambulate with MODIFIED INDEPENDENCE for 500 feet with the least restrictive device within 7 treatment day(s). (4.)Ms. Harriet Leija will perform standing static and dynamic balance activities x 15 minutes with MODIFIED INDEPENDENCE to improve safety within 7 day(s). (5.)Ms. Harriet Leija will ascend and descend 2+ stairs using 0-1 hand rail(s) with MODIFIED INDEPENDENCE to improve functional mobility and safety within 7 day(s). ________________________________________________________________________________________________ PHYSICAL THERAPY: Daily Note, Treatment Day: 1st, AM 1/9/2019INPATIENT: Hospital Day: 3 [place] / Product Type: Medicare /  
Spinal Brace when ambulating or sitting up NAME/AGE/GENDER: Farrukh Maxwell is a 70 y.o. female PRIMARY DIAGNOSIS: Lumbar stenosis with neurogenic claudication [M48.062] Spondylolisthesis of lumbar region [M43.16] Spinal stenosis of lumbar region with neurogenic claudication Spinal stenosis of lumbar region with neurogenic claudication Procedure(s) (LRB): 
L3-L5 LAMI FUSION SPINE TRANSFORAMINAL LUMBAR INTERBODY FUSION (TLIF) NEED EXTRA TECH (N/A) 2 Days Post-Op ICD-10: Treatment Diagnosis: · Difficulty in walking, Not elsewhere classified (R26.2) Precaution/Allergies: 
Latex, natural rubber; Celebrex [celecoxib]; and Mirtazapine ASSESSMENT:  
Ms. Barbara Pelayo is a 70 y.o. female admitted s/p above surgery. She lives with spouse in a 2 story home on the main level (spouse unable to assist with mobility for patient due to [place] issues. \") She is typically independent with ambulation, ADLs, driving, and recently had cervical surgery in late 2018. She plans to hire a home care aide to assist her with driving and feeding her cats at discharge. Patient was supine upon contact and agreeable to PT. Patient is doing much better today. She performs supine to sit with mod I and HOB elevated. Patient reports she is alternating between lying in the bed and sitting EOB. Patient was then able to increase gait distance to 600' with use of rolling walker, CGA-SBA and no LOB noted. Patient demonstrates good use of rolling walker and able to verbalize 3/3 spinal precautions. Overall good progress towards physical therapy goals. No goals have been met thus far. Will continue efforts. This section established at most recent assessment PROBLEM LIST (Impairments causing functional limitations): 1. Decreased Strength 2. Decreased ADL/Functional Activities 3. Decreased Transfer Abilities 4. Decreased Ambulation Ability/Technique 5. Decreased Balance 6. Increased Pain 7. Decreased Activity Tolerance 8. Decreased Knowledge of Precautions 9. Decreased [place] with Home Exercise Program 
 INTERVENTIONS PLANNED: (Benefits and precautions of physical therapy have been discussed with the patient.) 1. Balance Exercise 2. Bed Mobility 3. Family Education 4. Gait Training 5. Group Therapy 6. Home Exercise Program (HEP) 7. Therapeutic Activites 8. Therapeutic Exercise/Strengthening 9. Transfer Training 10. Patient Education TREATMENT PLAN: Frequency/Duration: twice daily for duration of hospital stay Rehabilitation Potential For Stated Goals: Excellent RECOMMENDED REHABILITATION/EQUIPMENT: (at time of discharge pending progress): Due to the probability of continued deficits (see above) this patient will not likely need continued skilled physical therapy after discharge. Equipment:  
? None at this time HISTORY:  
History of Present Injury/Illness (Reason for Referral): Recheck. The patient is 70years-old. She had an old MRI scan that showed problems in her back with stenosis and also spondylolisthesis. It was over a year old so we got a new one and I am reviewing it with her. She has pretty significant spinal stenosis at L3-4 and L4-5 and spondylolisthesis L4-5 and a severely degenerated L3-4 disc. Up above, it looks pretty good. She has a little disc bulging at L5-S1, but I do not think that is of significance. She has claudication symptoms, back and leg pain. It hurts when she stands and walks; gets better sitting. She has failed injections that used to work for her. Past Medical History/Comorbidities: Ms. Papa Pan  has a past medical history of Abnormal Papanicolaou smear of cervix, Anemia, Arthritis, Bowel trouble, Chronic pain, Depression, Elective , Fatigue, GERD (gastroesophageal reflux disease), Hypertension, Menopause, Miscarriage, Neck pain, Pain in breast, Pelvis fracture, right [facility]), Psychiatric disorder, Reflux esophagitis, and Sinus congestion. Ms. Ai Tate  has a past surgical history that includes us guided core breast biopsy (2016); hx colonoscopy (02/05/2009); hx endoscopy; hx orthopaedic (Left, 1998); hx cataract removal (Bilateral, 2017); pr neurological procedure unlisted (10/17/2018); L3-L5 LAMI FUSION SPINE TRANSFORAMINAL LUMBAR INTERBODY FUSION (TLIF) NEED EXTRA TECH (N/A, 1/7/2019); SPINE ANTERIOR CERVICAL DISCECTOMY WITH FUSION C6-7  SSEP NEURO MONITOR (N/A, 10/17/2018); and SPINE POSTERIOR CERVICAL LAMINECTOMY, FUSION WITH ALLOGRAFT AND INSTRUMENTATION C3-4 (N/A, 10/17/2018). Social History/Living Environment:  
Home Environment: Private residence # Steps to Enter: 2 Rails to Enter: No 
One/Two Story Residence: Two story, live on 1st floor Living Alone: No 
Support Systems: Spouse/Significant Other/Partner Patient Expects to be Discharged to[de-identified] Private residence Current DME Used/Available at Home: Walker, rolling, Brace/Splint Prior Level of Function/Work/Activity: She lives with spouse in a 2 story home on the main level (spouse unable to assist with mobility for patient due to [place] issues. \") She is typically independent with ambulation, ADLs, driving, and recently had cervical surgery in late 2018. She plans to hire a home care aide to assist her with driving and feeding her cats at discharge. Number of Personal Factors/Comorbidities that affect the Plan of Care: 3+: HIGH COMPLEXITY EXAMINATION:  
Most Recent Physical Functioning:  
Gross Assessment: 
  
         
  
Posture: 
  
Balance: 
Sitting: Intact Standing: Impaired Standing - Static: Good Standing - Dynamic : Fair Bed Mobility: 
Supine to Sit: Modified independent(HOB elevated) Sit to Supine: Modified independent Wheelchair Mobility: 
  
Transfers: 
Sit to Stand: Supervision Stand to Sit: Supervision Gait: 
  
Base of Support: Narrowed Speed/Erma: Slow Step Length: Right shortened;Left shortened Gait Abnormalities: Decreased step clearance; Path deviations Distance (ft): 600 Feet (ft) Assistive Device: Walker, rolling;Brace/Splint Ambulation - Level of Assistance: Stand-by assistance;Contact guard assistance Interventions: Safety awareness training; Tactile cues; Verbal cues Body Structures Involved: 1. Nerves 2. Bones 3. Joints 4. Muscles 5. Ligaments Body Functions Affected: 1. Sensory/Pain 2. Neuromusculoskeletal 
3. Movement Related Activities and Participation Affected: 1. Mobility 2. Self Care 3. Domestic Life 4. Interpersonal Interactions and Relationships 5. Community, Social and [place] Number of elements that affect the Plan of Care: 4+: HIGH COMPLEXITY CLINICAL PRESENTATION:  
Presentation: Stable and uncomplicated: LOW COMPLEXITY CLINICAL DECISION MAKING:  
[facility] AM-PAC 6 Clicks Basic Mobility Inpatient Short Form How much difficulty does the patient currently have. .. Unable A Lot A Little None 1. Turning over in bed (including adjusting bedclothes, sheets and blankets)? [] 1   [] 2   [] 3   [x] 4  
2. Sitting down on and standing up from a chair with arms ( e.g., wheelchair, bedside commode, etc.)   [] 1   [] 2   [x] 3   [] 4  
3. Moving from lying on back to sitting on the side of the bed? [] 1   [] 2   [] 3   [x] 4 How much help from another person does the patient currently need. .. Total A Lot A Little None 4. Moving to and from a bed to a chair (including a wheelchair)? [] 1   [] 2   [x] 3   [] 4  
5. Need to walk in hospital room? [] 1   [x] 2   [] 3   [] 4  
6. Climbing 3-5 steps with a railing? [] 1   [x] 2   [] 3   [] 4  
© 2007, Trustees of [facility], under license to [employer]. All rights reserved Score:  Initial: 18 Most Recent: X (Date: -- ) Interpretation of Tool:  Represents activities that are increasingly more difficult (i.e. Bed mobility, Transfers, Gait). Score 24 23 22-20 19-15 14-10 9-7 6 Modifier CH CI CJ CK CL CM CN   
 
? Mobility - Walking and Moving Around:  
  - CURRENT STATUS: CK - 40%-59% impaired, limited or restricted  - GOAL STATUS: CI - 1%-19% impaired, limited or restricted  - D/C STATUS:  ---------------To be determined--------------- [place] / [place] B / Product Type: Medicare /   
 
Medical Necessity:    
· Patient demonstrates excellent rehab potential due to higher previous functional level. Reason for Services/Other Comments: 
· Patient continues to require modification of therapeutic interventions to increase complexity of exercises. Use of outcome tool(s) and clinical judgement create a POC that gives a: Clear prediction of patient's progress: LOW COMPLEXITY  
  
 
 
 
TREATMENT:  
(In addition to Assessment/Re-Assessment sessions the following treatments were rendered) Pre-treatment Symptoms/Complaints:  none Pain: Initial:  
Pain Intensity 1: 0  Post Session:  6/10 Therapeutic Activity: (    25 minutes): Therapeutic activities including bed mobility training, transfer training, static/dynamic standing balance training, review of spinal precautions, instruction in sequencing with rolling walker, Ambulation on level ground and patient education to improve mobility, strength, balance and activity tolerance. Required minimal Safety awareness training; Tactile cues; Verbal cues to promote static and dynamic balance in standing, promote coordination of bilateral, lower extremity(s) and to promote improved ability to maintain spinal precautions through functional movements. Braces/Orthotics/Lines/Etc:  
· drain hemovac Treatment/Session Assessment:   
· Response to Treatment:  None · Interdisciplinary Collaboration:  
o Physical Therapy Assistant o Registered Nurse · After treatment position/precautions:  
o Bed/Chair-wheels locked 
o Bed in low position 
o Call light within reach 
o RN notified 
o Seated EOB · Compliance with Program/Exercises: Will assess as treatment progresses · Recommendations/Intent for next treatment session: \"Next visit will focus on advancements to more challenging activities and reduction in assistance provided\". Total Treatment Duration: PT Patient Time In/Time Out Time In: 1010 Time Out: 1035 Afshin Leon, PTA

## 2019-01-09 NOTE — PROGRESS NOTES
Chart screened by  for discharge planning. During IDT rounds per therapies no anticipated needs noted. Please consult  if any new issues arise.

## 2019-01-09 NOTE — PROGRESS NOTES
's visit requested by Ms. Atif Palmer. She is known to me from a prior admission. She is hopeful for hospital discharge tomorrow. Radha Man MDiv Board Certified [employer]

## 2019-01-09 NOTE — PROGRESS NOTES
Problem: Mobility Impaired (Adult and Pediatric) Goal: *Acute Goals and Plan of Care (Insert Text) STG: 
(1.)Ms. Chris Berrios will move from supine to sit and sit to supine , scoot up and down and roll side to side with SUPERVISION within 3 treatment day(s). (2.)Ms. Chris Berrios will transfer from bed to chair and chair to bed with STAND BY ASSIST using the least restrictive device within 3 treatment day(s). (3.)Ms. Chris Berrios will ambulate with STAND BY ASSIST for 250 feet with the least restrictive device within 3 treatment day(s). (4.)Ms. Clifford Huynh will perform standing static and dynamic balance activities x 15 minutes with STAND BY ASSIST to improve safety within 3 day(s). (5.)Ms. Chris Berrios will maintain spinal precautions throughout all functional mobility within 3 days with 0 verbal cues. (6.)Ms. Chris Berrios will don spinal brace independently within 3 days to improve independence with functional mobility. LTG: 
(1.)Ms. Chris Berrios will move from supine to sit and sit to supine , scoot up and down and roll side to side in bed with MODIFIED INDEPENDENCE within 7 treatment day(s). (2.)Ms. Chris Berrios will transfer from bed to chair and chair to bed with MODIFIED INDEPENDENCE using the least restrictive device within 7 treatment day(s). (3.)Ms. Chris Berrios will ambulate with MODIFIED INDEPENDENCE for 500 feet with the least restrictive device within 7 treatment day(s). (4.)Ms. Chris Berrios will perform standing static and dynamic balance activities x 15 minutes with MODIFIED INDEPENDENCE to improve safety within 7 day(s). (5.)Ms. Chris Berrios will ascend and descend 2+ stairs using 0-1 hand rail(s) with MODIFIED INDEPENDENCE to improve functional mobility and safety within 7 day(s). ________________________________________________________________________________________________ PHYSICAL THERAPY: Daily Note, Treatment Day: 1st, PM 1/9/2019INPATIENT: Hospital Day: 3 [place] / Product Type: Medicare /  
Spinal Brace when ambulating or sitting up NAME/AGE/GENDER: Sherif Dang is a 70 y.o. female PRIMARY DIAGNOSIS: Lumbar stenosis with neurogenic claudication [M48.062] Spondylolisthesis of lumbar region [M43.16] Spinal stenosis of lumbar region with neurogenic claudication Spinal stenosis of lumbar region with neurogenic claudication Procedure(s) (LRB): 
L3-L5 LAMI FUSION SPINE TRANSFORAMINAL LUMBAR INTERBODY FUSION (TLIF) NEED EXTRA TECH (N/A) 2 Days Post-Op ICD-10: Treatment Diagnosis: · Difficulty in walking, Not elsewhere classified (R26.2) Precaution/Allergies: 
Latex, natural rubber; Celebrex [celecoxib]; and Mirtazapine ASSESSMENT:  
Ms. Cat Perez is a 70 y.o. female admitted s/p above surgery. She lives with spouse in a 2 story home on the main level (spouse unable to assist with mobility for patient due to [place] issues. \") She is typically independent with ambulation, ADLs, driving, and recently had cervical surgery in late 2018. She plans to hire a home care aide to assist her with driving and feeding her cats at discharge. Patient was supine upon contact and agreeable to PT. Patient performs supine to sit with mod I and HOB elevated. Patient reports she is alternating between lying in the bed and sitting EOB. Patient dons lumbar corset with mod assist and cues for technique. Patient was then able to increase gait distance to 700' with use of rolling walker, CGA-SBA and no LOB noted. Patient demonstrates good use of rolling walker and able to verbalize 3/3 spinal precautions. Returns to EOB and to supine with mod I. Overall good progress towards physical therapy goals. No goals have been met thus far. Will continue efforts. This section established at most recent assessment PROBLEM LIST (Impairments causing functional limitations): 1. Decreased Strength 2. Decreased ADL/Functional Activities 3. Decreased Transfer Abilities 4. Decreased Ambulation Ability/Technique 5. Decreased Balance 6. Increased Pain 7. Decreased Activity Tolerance 8. Decreased Knowledge of Precautions 9. Decreased [place] with Home Exercise Program 
 INTERVENTIONS PLANNED: (Benefits and precautions of physical therapy have been discussed with the patient.) 1. Balance Exercise 2. Bed Mobility 3. Family Education 4. Gait Training 5. Group Therapy 6. Home Exercise Program (HEP) 7. Therapeutic Activites 8. Therapeutic Exercise/Strengthening 9. Transfer Training 10. Patient Education TREATMENT PLAN: Frequency/Duration: twice daily for duration of hospital stay Rehabilitation Potential For Stated Goals: Excellent RECOMMENDED REHABILITATION/EQUIPMENT: (at time of discharge pending progress): Due to the probability of continued deficits (see above) this patient will not likely need continued skilled physical therapy after discharge. Equipment:  
? None at this time HISTORY:  
History of Present Injury/Illness (Reason for Referral): Recheck. The patient is 70years-old. She had an old MRI scan that showed problems in her back with stenosis and also spondylolisthesis. It was over a year old so we got a new one and I am reviewing it with her. She has pretty significant spinal stenosis at L3-4 and L4-5 and spondylolisthesis L4-5 and a severely degenerated L3-4 disc. Up above, it looks pretty good. She has a little disc bulging at L5-S1, but I do not think that is of significance. She has claudication symptoms, back and leg pain. It hurts when she stands and walks; gets better sitting. She has failed injections that used to work for her. Past Medical History/Comorbidities: Ms. Marianne Rodriguez  has a past medical history of Abnormal Papanicolaou smear of cervix, Anemia, Arthritis, Bowel trouble, Chronic pain, Depression, Elective , Fatigue, GERD (gastroesophageal reflux disease), Hypertension, Menopause, Miscarriage, Neck pain, Pain in breast, Pelvis fracture, right ([address] Psychiatric disorder, Reflux esophagitis, and Sinus congestion. Ms. Rachel Cade  has a past surgical history that includes us guided core breast biopsy (); hx colonoscopy (2009); hx endoscopy; hx orthopaedic (Left, ); hx cataract removal (Bilateral, ); pr neurological procedure unlisted (10/17/2018); L3-L5 LAMI FUSION SPINE TRANSFORAMINAL LUMBAR INTERBODY FUSION (TLIF) NEED EXTRA TECH (N/A, 2019); SPINE ANTERIOR CERVICAL DISCECTOMY WITH FUSION C6-7  SSEP NEURO MONITOR (N/A, 10/17/2018); and SPINE POSTERIOR CERVICAL LAMINECTOMY, FUSION WITH ALLOGRAFT AND INSTRUMENTATION C3-4 (N/A, 10/17/2018). Social History/Living Environment:  
Home Environment: Private residence # Steps to Enter: 2 Rails to Enter: No 
One/Two Story Residence: Two story, live on 1st floor Living Alone: No 
Support Systems: Spouse/Significant Other/Partner Patient Expects to be Discharged to[de-identified] Private residence Current DME Used/Available at Home: Walker, rolling, Brace/Splint Prior Level of Function/Work/Activity: She lives with spouse in a 2 story home on the main level (spouse unable to assist with mobility for patient due to [place] issues. \") She is typically independent with ambulation, ADLs, driving, and recently had cervical surgery in late 2018. She plans to hire a home care aide to assist her with driving and feeding her cats at discharge. Number of Personal Factors/Comorbidities that affect the Plan of Care: 3+: HIGH COMPLEXITY EXAMINATION:  
Most Recent Physical Functioning:  
Gross Assessment: 
  
         
  
Posture: 
  
Balance: 
Sitting: Intact Standing: Impaired Standing - Static: Good Standing - Dynamic : Fair Bed Mobility: 
Supine to Sit: Modified independent(HOB elevated) Sit to Supine: Modified independent Wheelchair Mobility: Transfers: 
Sit to Stand: Supervision Stand to Sit: Supervision Gait: 
  
Base of Support: Narrowed Speed/Erma: Slow Step Length: Right shortened;Left shortened Gait Abnormalities: Decreased step clearance; Path deviations Distance (ft): 700 Feet (ft) Assistive Device: Walker, rolling;Brace/Splint Ambulation - Level of Assistance: Stand-by assistance;Contact guard assistance Interventions: Safety awareness training; Tactile cues; Verbal cues Body Structures Involved: 1. Nerves 2. Bones 3. Joints 4. Muscles 5. Ligaments Body Functions Affected: 1. Sensory/Pain 2. Neuromusculoskeletal 
3. Movement Related Activities and Participation Affected: 1. Mobility 2. Self Care 3. Domestic Life 4. Interpersonal Interactions and Relationships 5. Community, Social and [place] Number of elements that affect the Plan of Care: 4+: HIGH COMPLEXITY CLINICAL PRESENTATION:  
Presentation: Stable and uncomplicated: LOW COMPLEXITY CLINICAL DECISION MAKING:  
[facility] AM-PAC 6 Clicks Basic Mobility Inpatient Short Form How much difficulty does the patient currently have. .. Unable A Lot A Little None 1. Turning over in bed (including adjusting bedclothes, sheets and blankets)? [] 1   [] 2   [] 3   [x] 4  
2. Sitting down on and standing up from a chair with arms ( e.g., wheelchair, bedside commode, etc.)   [] 1   [] 2   [x] 3   [] 4  
3. Moving from lying on back to sitting on the side of the bed? [] 1   [] 2   [] 3   [x] 4 How much help from another person does the patient currently need. .. Total A Lot A Little None 4. Moving to and from a bed to a chair (including a wheelchair)? [] 1   [] 2   [x] 3   [] 4  
5. Need to walk in hospital room? [] 1   [x] 2   [] 3   [] 4  
6. Climbing 3-5 steps with a railing? [] 1   [x] 2   [] 3   [] 4  
© 2007, Trustees of [facility], under license to [employer]. All rights reserved Score:  Initial: 18 Most Recent: X (Date: -- ) Interpretation of Tool:  Represents activities that are increasingly more difficult (i.e. Bed mobility, Transfers, Gait). Score 24 23 22-20 19-15 14-10 9-7 6 Modifier CH CI CJ CK CL CM CN   
 
? Mobility - Walking and Moving Around:  
  - CURRENT STATUS: CK - 40%-59% impaired, limited or restricted  - GOAL STATUS: CI - 1%-19% impaired, limited or restricted  - D/C STATUS:  ---------------To be determined--------------- [place] / [place] B / Product Type: Medicare /   
 
Medical Necessity:    
· Patient demonstrates excellent rehab potential due to higher previous functional level. Reason for Services/Other Comments: 
· Patient continues to require modification of therapeutic interventions to increase complexity of exercises. Use of outcome tool(s) and clinical judgement create a POC that gives a: Clear prediction of patient's progress: LOW COMPLEXITY  
  
 
 
 
TREATMENT:  
(In addition to Assessment/Re-Assessment sessions the following treatments were rendered) Pre-treatment Symptoms/Complaints:  none Pain: Initial:  
Pain Intensity 1: 0  Post Session:  6/10 Therapeutic Activity: (    23 minutes): Therapeutic activities including bed mobility training, transfer training, static/dynamic standing balance training, review of spinal precautions, instruction in sequencing with rolling walker, Ambulation on level ground and patient education to improve mobility, strength, balance and activity tolerance. Required minimal Safety awareness training; Tactile cues; Verbal cues to promote static and dynamic balance in standing, promote coordination of bilateral, lower extremity(s) and to promote improved ability to maintain spinal precautions through functional movements. Braces/Orthotics/Lines/Etc:  
· drain hemovac Treatment/Session Assessment:   
· Response to Treatment:  None · Interdisciplinary Collaboration:  
o Physical Therapy Assistant 
o Registered Nurse · After treatment position/precautions:  
o Supine in bed 
o Bed/Chair-wheels locked 
o Bed in low position 
o Call light within reach 
o RN notified · Compliance with Program/Exercises: Will assess as treatment progresses · Recommendations/Intent for next treatment session: \"Next visit will focus on advancements to more challenging activities and reduction in assistance provided\". Total Treatment Duration: PT Patient Time In/Time Out Time In: 4407 Time Out: 3316 Zak Leon, ANJU

## 2019-01-09 NOTE — PROGRESS NOTES
Problem: Falls - Risk of 
Goal: *Absence of Falls Document Jenny Agostos Fall Risk and appropriate interventions in the flowsheet. Outcome: Progressing Towards Goal 
Fall Risk Interventions: 
Mobility Interventions: Bed/chair exit alarm, Communicate number of staff needed for ambulation/transfer, OT consult for ADLs, Patient to call before getting OOB, PT Consult for mobility concerns, PT Consult for assist device competence, Strengthening exercises (ROM-active/passive) Medication Interventions: Bed/chair exit alarm, Evaluate medications/consider consulting pharmacy, Patient to call before getting OOB Elimination Interventions: Call light in reach, Patient to call for help with toileting needs, Toilet paper/wipes in reach, Toileting schedule/hourly rounds

## 2019-01-10 VITALS
SYSTOLIC BLOOD PRESSURE: 123 MMHG | OXYGEN SATURATION: 97 % | RESPIRATION RATE: 18 BRPM | DIASTOLIC BLOOD PRESSURE: 70 MMHG | TEMPERATURE: 98.6 F | BODY MASS INDEX: 23.79 KG/M2 | HEART RATE: 87 BPM | WEIGHT: 121.19 LBS | HEIGHT: 60 IN

## 2019-01-10 LAB
ERYTHROCYTE [DISTWIDTH] IN BLOOD BY AUTOMATED COUNT: 15.2 % (ref 11.9–14.6)
HCT VFR BLD AUTO: 26.9 % (ref 35.8–46.3)
HGB BLD-MCNC: 8.8 G/DL (ref 11.7–15.4)
MCH RBC QN AUTO: 30.2 PG (ref 26.1–32.9)
MCHC RBC AUTO-ENTMCNC: 32.7 G/DL (ref 31.4–35)
MCV RBC AUTO: 92.4 FL (ref 79.6–97.8)
NRBC # BLD: 0 K/UL (ref 0–0.2)
PLATELET # BLD AUTO: 287 K/UL (ref 150–450)
PMV BLD AUTO: 10.4 FL (ref 9.4–12.3)
RBC # BLD AUTO: 2.91 M/UL (ref 4.05–5.2)
WBC # BLD AUTO: 11.3 K/UL (ref 4.3–11.1)

## 2019-01-10 PROCEDURE — 85027 COMPLETE CBC AUTOMATED: CPT

## 2019-01-10 PROCEDURE — 97530 THERAPEUTIC ACTIVITIES: CPT

## 2019-01-10 PROCEDURE — 74011250637 HC RX REV CODE- 250/637: Performed by: ORTHOPAEDIC SURGERY

## 2019-01-10 PROCEDURE — 36415 COLL VENOUS BLD VENIPUNCTURE: CPT

## 2019-01-10 RX ORDER — HYDROCODONE BITARTRATE AND ACETAMINOPHEN 7.5; 325 MG/1; MG/1
1 TABLET ORAL
Qty: 50 TAB | Refills: 0 | Status: SHIPPED | OUTPATIENT
Start: 2019-01-10 | End: 2019-05-15

## 2019-01-10 RX ADMIN — Medication 10 ML: at 06:04

## 2019-01-10 RX ADMIN — CYCLOBENZAPRINE HYDROCHLORIDE 5 MG: 10 TABLET, FILM COATED ORAL at 03:49

## 2019-01-10 RX ADMIN — HYDROCODONE BITARTRATE AND ACETAMINOPHEN 1 TABLET: 7.5; 325 TABLET ORAL at 08:46

## 2019-01-10 RX ADMIN — MEDROXYPROGESTERONE ACETATE 2.5 MG: 10 TABLET ORAL at 08:47

## 2019-01-10 RX ADMIN — AMLODIPINE BESYLATE 5 MG: 5 TABLET ORAL at 08:47

## 2019-01-10 RX ADMIN — MAGNESIUM HYDROXIDE 30 ML: 400 SUSPENSION ORAL at 08:47

## 2019-01-10 RX ADMIN — PANTOPRAZOLE SODIUM 40 MG: 40 TABLET, DELAYED RELEASE ORAL at 06:03

## 2019-01-10 RX ADMIN — HYDROCODONE BITARTRATE AND ACETAMINOPHEN 1 TABLET: 7.5; 325 TABLET ORAL at 03:49

## 2019-01-10 RX ADMIN — MELOXICAM 7.5 MG: 7.5 TABLET ORAL at 08:47

## 2019-01-10 RX ADMIN — VITAMIN D, TAB 1000IU (100/BT) 5000 UNITS: 25 TAB at 08:46

## 2019-01-10 RX ADMIN — VALSARTAN 160 MG: 160 TABLET, FILM COATED ORAL at 08:47

## 2019-01-10 RX ADMIN — BUPROPION HYDROCHLORIDE 150 MG: 150 TABLET, EXTENDED RELEASE ORAL at 08:46

## 2019-01-10 RX ADMIN — GABAPENTIN 100 MG: 100 CAPSULE ORAL at 06:03

## 2019-01-10 RX ADMIN — Medication 1 AMPULE: at 08:47

## 2019-01-10 RX ADMIN — ESTROGENS, CONJUGATED 1.25 MG: 0.62 TABLET, FILM COATED ORAL at 08:47

## 2019-01-10 RX ADMIN — FAMOTIDINE 20 MG: 20 TABLET ORAL at 08:47

## 2019-01-10 NOTE — DISCHARGE INSTRUCTIONS
Discharge Instructions - Lumbar Fusion    Incision  Please have someone check your incisions daily. If any of the following should occur, please call the office:   Drainage from incision site   Opening of incisions   Fevers greater than 101 degrees   Flu-like symptoms   Increased redness and/or tenderness  If you have staples or sutures instead of tape on your incision, they may be removed 10-14 days following your surgery. This may be done by a visiting nurse, rehab physician, or at your first follow up visit at our office. Otherwise, if your wound is covered with tape strips, they will typically fall of with showering. Brace  If a brace is prescribed, wear it at all times except for sleeping and showering. Always wear a t-shirt under your brace so that it is not directly in contact with your bare skin. The brace may cause you to sweat and you may feel warm. This can irritate your skin so pay special attention to the incision. Showering  If your surgeon allows, you may shower as normal once the large, bulky bandages are removed from your incision. If they are not removed before your discharge from the hospital, you may remove them 36-48 hours after your surgery. Hair washing is permissible while in the shower. No tub baths, hot tubs or whirlpools until seen in the office. You may remove your brace for showering. Exercise  Lift only objects weighing less than 10-15 lbs. Do not bend or twist at the waist. Always bend with your knees! Limit your sitting to 20-30 minute intervals. You should lie down or walk in between sitting periods. There are no limitations for sitting in a recliner chair. Walk as much as possible and let discomfort. Pain  Take pain medicine as prescribed. As your pain level decreases, you may begin to take over-the-counter Extra-Strength Tylenol. Do NOT take any anti-inflammatory (Advil, Aleve, Motrin) medications for 10 weeks after surgery.  Do not resume taking Fosamax for eight weeks after your fusion surgery. Driving  You may NOT drive a car until told otherwise by your physician. You may be a passenger for short distances (about 20-30 minutes). If you must take a longer trip, be sure to make several pit stops so that you can walk and stretch your legs. Reclining in the passenger seat seems to be the most comfortable position for most patients. Follow-Up Appointments  When you are discharged from the hospital, a follow up appointment will be made for 2-3 weeks from your surgery date. Call [phone] to confirm your appointment. If you have additional questions or concerns, call our office ([address] [place] [address]) [phone]. DISCHARGE SUMMARY from Nurse    PATIENT INSTRUCTIONS:    After general anesthesia or intravenous sedation, for 24 hours or while taking prescription Narcotics:  · Limit your activities  · Do not drive and operate hazardous machinery  · Do not make important personal or business decisions  · Do  not drink alcoholic beverages  · If you have not urinated within 8 hours after discharge, please contact your surgeon on call. Report the following to your surgeon:  · Excessive pain, swelling, redness or odor of or around the surgical area  · Temperature over 100.5  · Nausea and vomiting lasting longer than 4 hours or if unable to take medications  · Any signs of decreased circulation or nerve impairment to extremity: change in color, persistent  numbness, tingling, coldness or increase pain  · Any questions    What to do at Home:  Recommended activity: Per MD and PT instructions    If you experience any of the following symptoms fever > 100.5, nausea, vomiting, pain, chest pain and/or shortness of breath to ER please follow up with MD.    *  Please give a list of your current medications to your Primary Care Provider.     *  Please update this list whenever your medications are discontinued, doses are      changed, or new medications (including over-the-counter products) are added. *  Please carry medication information at all times in case of emergency situations. These are general instructions for a healthy lifestyle:    No smoking/ No tobacco products/ Avoid exposure to second hand smoke  Surgeon General's Warning:  Quitting smoking now greatly reduces serious risk to your health. Obesity, smoking, and sedentary lifestyle greatly increases your risk for illness    A healthy diet, regular physical exercise & weight monitoring are important for maintaining a healthy lifestyle    You may be retaining fluid if you have a history of heart failure or if you experience any of the following symptoms:  Weight gain of 3 pounds or more overnight or 5 pounds in a week, increased swelling in our hands or feet or shortness of breath while lying flat in bed. Please call your doctor as soon as you notice any of these symptoms; do not wait until your next office visit. Recognize signs and symptoms of STROKE:    F-face looks uneven    A-arms unable to move or move unevenly    S-speech slurred or non-existent    T-time-call 911 as soon as signs and symptoms begin-DO NOT go       Back to bed or wait to see if you get better-TIME IS BRAIN. Warning Signs of HEART ATTACK     Call 911 if you have these symptoms:   Chest discomfort. Most heart attacks involve discomfort in the center of the chest that lasts more than a few minutes, or that goes away and comes back. It can feel like uncomfortable pressure, squeezing, fullness, or pain.  Discomfort in other areas of the upper body. Symptoms can include pain or discomfort in one or both arms, the back, neck, jaw, or stomach.  Shortness of breath with or without chest discomfort.  Other signs may include breaking out in a cold sweat, nausea, or lightheadedness. Don't wait more than five minutes to call 911 - MINUTES MATTER! Fast action can save your life.  Calling 911 is almost always the fastest way to get lifesaving treatment. Emergency Medical Services staff can begin treatment when they arrive -- up to an hour sooner than if someone gets to the hospital by car. The discharge information has been reviewed with the patient. The patient verbalized understanding. Discharge medications reviewed with the patient and appropriate educational materials and side effects teaching were provided.   ___________________________________________________________________________________________________________________________________

## 2019-01-10 NOTE — PROGRESS NOTES
POD 3 Hgb - 8.8 Drain - 80cc 
 
AF,VSS Patient feeling better, walking well, voiding Dressing with large bloody portion Neuro intact Improving, will do PT this morning and discharge after. Patient was transfused 2 units of PRBC post operatively secondary to acute blood loss anemia

## 2019-01-10 NOTE — PROGRESS NOTES
Pt's D/C instructions completed. Verbalized understanding of all instructions including diet, activity, s/sx to alert MD, medications, wound care, and f/u appointment. Family to be here at 12:00 pm for .

## 2019-01-10 NOTE — PROGRESS NOTES
Problem: Mobility Impaired (Adult and Pediatric) Goal: *Acute Goals and Plan of Care (Insert Text) STG: 
(1.)Ms. Yaz Reynolds will move from supine to sit and sit to supine , scoot up and down and roll side to side with SUPERVISION within 3 treatment day(s). (2.)Ms. Yaz Reynolds will transfer from bed to chair and chair to bed with STAND BY ASSIST using the least restrictive device within 3 treatment day(s). (3.)Ms. Yaz Reynolds will ambulate with STAND BY ASSIST for 250 feet with the least restrictive device within 3 treatment day(s). (4.)Ms. Demetrius Holt will perform standing static and dynamic balance activities x 15 minutes with STAND BY ASSIST to improve safety within 3 day(s). (5.)Ms. Yaz Reynolds will maintain spinal precautions throughout all functional mobility within 3 days with 0 verbal cues. (6.)Ms. Yaz Reynolds will don spinal brace independently within 3 days to improve independence with functional mobility. LTG: 
(1.)Ms. Yaz Reynolds will move from supine to sit and sit to supine , scoot up and down and roll side to side in bed with MODIFIED INDEPENDENCE within 7 treatment day(s). (2.)Ms. Yaz Reynolds will transfer from bed to chair and chair to bed with MODIFIED INDEPENDENCE using the least restrictive device within 7 treatment day(s). (3.)Ms. Yaz Reynolds will ambulate with MODIFIED INDEPENDENCE for 500 feet with the least restrictive device within 7 treatment day(s). (4.)Ms. Yaz Reynolds will perform standing static and dynamic balance activities x 15 minutes with MODIFIED INDEPENDENCE to improve safety within 7 day(s). (5.)Ms. Yaz Reynolds will ascend and descend 2+ stairs using 0-1 hand rail(s) with MODIFIED INDEPENDENCE to improve functional mobility and safety within 7 day(s). ________________________________________________________________________________________________ PHYSICAL THERAPY: Daily Note, Treatment Day: 2nd, AM 1/10/2019INPATIENT: Hospital Day: 4 [place] / Product Type: Medicare /  
Spinal Brace when ambulating or sitting up NAME/AGE/GENDER: Adele Denny is a 70 y.o. female PRIMARY DIAGNOSIS: Lumbar stenosis with neurogenic claudication [M48.062] Spondylolisthesis of lumbar region [M43.16] Spinal stenosis of lumbar region with neurogenic claudication Spinal stenosis of lumbar region with neurogenic claudication Procedure(s) (LRB): 
L3-L5 LAMI FUSION SPINE TRANSFORAMINAL LUMBAR INTERBODY FUSION (TLIF) NEED EXTRA TECH (N/A) 3 Days Post-Op ICD-10: Treatment Diagnosis: · Difficulty in walking, Not elsewhere classified (R26.2) Precaution/Allergies: 
Latex, natural rubber; Celebrex [celecoxib]; and Mirtazapine ASSESSMENT:  
Ms. Rachel Cade is a 70 y.o. female admitted s/p above surgery. She lives with spouse in a 2 story home on the main level (spouse unable to assist with mobility for patient due to [place] issues. \") She is typically independent with ambulation, ADLs, driving, and recently had cervical surgery in late 2018. She plans to hire a home care aide to assist her with driving and feeding her cats at discharge. Patient was supine upon contact and agreeable to PT. Patient performs supine to sit with mod I and HOB elevated. Patient dons lumbar corset with SBA and cues for technique. Patient was then able to ambulate 700' with use of rolling walker, SBA and no LOB noted. Patient demonstrates good use of rolling walker and able to verbalize 3/3 spinal precautions. Patient also performs stair training x 3 stairs x 4 reps with cues for technique demonstrating good ability to perform. Returns to room where she performs functional mobility within room/bathroom while performing ADL activity demonstrating good static standing balance and fair+ dynamic standing balance. Patient returns to EOB and to supine with mod I. Overall good progress towards physical therapy goals.  No goals have been met thus far. Will continue efforts. This section established at most recent assessment PROBLEM LIST (Impairments causing functional limitations): 1. Decreased Strength 2. Decreased ADL/Functional Activities 3. Decreased Transfer Abilities 4. Decreased Ambulation Ability/Technique 5. Decreased Balance 6. Increased Pain 7. Decreased Activity Tolerance 8. Decreased Knowledge of Precautions 9. Decreased [place] with Home Exercise Program 
 INTERVENTIONS PLANNED: (Benefits and precautions of physical therapy have been discussed with the patient.) 1. Balance Exercise 2. Bed Mobility 3. Family Education 4. Gait Training 5. Group Therapy 6. Home Exercise Program (HEP) 7. Therapeutic Activites 8. Therapeutic Exercise/Strengthening 9. Transfer Training 10. Patient Education TREATMENT PLAN: Frequency/Duration: twice daily for duration of hospital stay Rehabilitation Potential For Stated Goals: Excellent RECOMMENDED REHABILITATION/EQUIPMENT: (at time of discharge pending progress): Due to the probability of continued deficits (see above) this patient will not likely need continued skilled physical therapy after discharge. Equipment:  
? None at this time HISTORY:  
History of Present Injury/Illness (Reason for Referral): Recheck. The patient is 70years-old. She had an old MRI scan that showed problems in her back with stenosis and also spondylolisthesis. It was over a year old so we got a new one and I am reviewing it with her. She has pretty significant spinal stenosis at L3-4 and L4-5 and spondylolisthesis L4-5 and a severely degenerated L3-4 disc. Up above, it looks pretty good. She has a little disc bulging at L5-S1, but I do not think that is of significance. She has claudication symptoms, back and leg pain. It hurts when she stands and walks; gets better sitting. She has failed injections that used to work for her. Past Medical History/Comorbidities: Ms. Marianne Rodriguez  has a past medical history of Abnormal Papanicolaou smear of cervix, Anemia, Arthritis, Bowel trouble, Chronic pain, Depression, Elective , Fatigue, GERD (gastroesophageal reflux disease), Hypertension, Menopause, Miscarriage, Neck pain, Pain in breast, Pelvis fracture, right ([address] Psychiatric disorder, Reflux esophagitis, and Sinus congestion. Ms. Marianne Rodriguez  has a past surgical history that includes us guided core breast biopsy (); hx colonoscopy (2009); hx endoscopy; hx orthopaedic (Left, ); hx cataract removal (Bilateral, ); pr neurological procedure unlisted (10/17/2018); L3-L5 LAMI FUSION SPINE TRANSFORAMINAL LUMBAR INTERBODY FUSION (TLIF) NEED EXTRA TECH (N/A, 2019); SPINE ANTERIOR CERVICAL DISCECTOMY WITH FUSION C6-7  SSEP NEURO MONITOR (N/A, 10/17/2018); and SPINE POSTERIOR CERVICAL LAMINECTOMY, FUSION WITH ALLOGRAFT AND INSTRUMENTATION C3-4 (N/A, 10/17/2018). Social History/Living Environment:  
Home Environment: Private residence # Steps to Enter: 2 Rails to Enter: No 
One/Two Story Residence: Two story, live on 1st floor Living Alone: No 
Support Systems: Spouse/Significant Other/Partner Patient Expects to be Discharged to[de-identified] Private residence Current DME Used/Available at Home: Walker, rolling, Brace/Splint Prior Level of Function/Work/Activity: She lives with spouse in a 2 story home on the main level (spouse unable to assist with mobility for patient due to [place] issues. \") She is typically independent with ambulation, ADLs, driving, and recently had cervical surgery in late 2018. She plans to hire a home care aide to assist her with driving and feeding her cats at discharge. Number of Personal Factors/Comorbidities that affect the Plan of Care: 3+: HIGH COMPLEXITY EXAMINATION:  
Most Recent Physical Functioning:  
Gross Assessment: 
  
         
  
Posture: 
  
Balance: 
Sitting: Intact Standing: Impaired Standing - Static: Good Standing - Dynamic : Fair Bed Mobility: 
Supine to Sit: Modified independent Sit to Supine: Modified independent Wheelchair Mobility: 
  
Transfers: 
Sit to Stand: Modified independent Stand to Sit: Modified independent Gait: 
  
Base of Support: Narrowed Speed/Erma: Slow Step Length: Right shortened;Left shortened Gait Abnormalities: Decreased step clearance; Path deviations Distance (ft): 700 Feet (ft) Assistive Device: Walker, rolling;Brace/Splint Ambulation - Level of Assistance: Stand-by assistance;Supervision Number of Stairs Trained: (3' x 4) Stairs - Level of Assistance: Stand-by assistance Rail Use: Both Interventions: Safety awareness training;Verbal cues; Tactile cues Body Structures Involved: 1. Nerves 2. Bones 3. Joints 4. Muscles 5. Ligaments Body Functions Affected: 1. Sensory/Pain 2. Neuromusculoskeletal 
3. Movement Related Activities and Participation Affected: 1. Mobility 2. Self Care 3. Domestic Life 4. Interpersonal Interactions and Relationships 5. Community, Social and [place] Number of elements that affect the Plan of Care: 4+: HIGH COMPLEXITY CLINICAL PRESENTATION:  
Presentation: Stable and uncomplicated: LOW COMPLEXITY CLINICAL DECISION MAKIN[address] [place] [address] 6 Clicks Basic Mobility Inpatient Short Form How much difficulty does the patient currently have. .. Unable A Lot A Little None 1. Turning over in bed (including adjusting bedclothes, sheets and blankets)? [] 1   [] 2   [] 3   [x] 4  
2. Sitting down on and standing up from a chair with arms ( e.g., wheelchair, bedside commode, etc.)   [] 1   [] 2   [x] 3   [] 4  
3. Moving from lying on back to sitting on the side of the bed? [] 1   [] 2   [] 3   [x] 4 How much help from another person does the patient currently need. .. Total A Lot A Little None 4. Moving to and from a bed to a chair (including a wheelchair)?    [] 1   [] 2   [x] 3   [] 4  
 5.  Need to walk in hospital room? [] 1   [x] 2   [] 3   [] 4  
6. Climbing 3-5 steps with a railing? [] 1   [x] 2   [] 3   [] 4  
© 2007, Trustees of [facility] [employer], under license to [email or web address]. All rights reserved Score:  Initial: 18 Most Recent: X (Date: -- ) Interpretation of Tool:  Represents activities that are increasingly more difficult (i.e. Bed mobility, Transfers, Gait). Score 24 23 22-20 19-15 14-10 9-7 6 Modifier CH CI CJ CK CL CM CN   
 
? Mobility - Walking and Moving Around:  
  - CURRENT STATUS: CK - 40%-59% impaired, limited or restricted  - GOAL STATUS: CI - 1%-19% impaired, limited or restricted  - D/C STATUS:  ---------------To be determined--------------- [place] / [place] / Product Type: Medicare /   
 
Medical Necessity:    
· Patient demonstrates excellent rehab potential due to higher previous functional level. Reason for Services/Other Comments: 
· Patient continues to require modification of therapeutic interventions to increase complexity of exercises. Use of outcome tool(s) and clinical judgement create a POC that gives a: Clear prediction of patient's progress: LOW COMPLEXITY  
  
 
 
 
TREATMENT:  
(In addition to Assessment/Re-Assessment sessions the following treatments were rendered) Pre-treatment Symptoms/Complaints:  none Pain: Initial:  
Pain Intensity 1: 0  Post Session:  6/10 Therapeutic Activity: (    25 minutes): Therapeutic activities including bed mobility training, transfer training from various surface heights, static/dynamic standing balance training, stair training, review of spinal precautions, instruction in sequencing with rolling walker, Ambulation on level ground and patient education to improve mobility, strength, balance and activity tolerance. Required minimal Safety awareness training;Verbal cues; Tactile cues to promote static and dynamic balance in standing, promote coordination of bilateral, lower extremity(s) and to promote improved ability to maintain spinal precautions through functional movements. Braces/Orthotics/Lines/Etc:  
· drain hemovac Treatment/Session Assessment:   
· Response to Treatment:  None · Interdisciplinary Collaboration:  
o Physical Therapy Assistant 
o Registered Nurse · After treatment position/precautions:  
o Supine in bed 
o Bed/Chair-wheels locked 
o Bed in low position 
o Call light within reach 
o RN notified · Compliance with Program/Exercises: Will assess as treatment progresses · Recommendations/Intent for next treatment session: \"Next visit will focus on advancements to more challenging activities and reduction in assistance provided\". Total Treatment Duration: PT Patient Time In/Time Out Time In: 9316 Time Out: 10[address] [place], ANJU

## 2019-01-11 ENCOUNTER — PATIENT OUTREACH (OUTPATIENT)
Dept: CASE MANAGEMENT | Age: 64
End: 2019-01-11

## 2019-01-14 ENCOUNTER — PATIENT OUTREACH (OUTPATIENT)
Dept: CASE MANAGEMENT | Age: 64
End: 2019-01-14

## 2019-01-14 NOTE — PROGRESS NOTES
This note will not be viewable in [address] 2nd attempt to contact patient for [facility] call, no answer, left  for returned call. Will attempt 3rd call within 5 business days.

## 2019-01-15 NOTE — DISCHARGE SUMMARY
[address] Teresa  MR#: [id number]  : 1955  ACCOUNT #: [de-identified]   ADMIT DATE: 2019  DISCHARGE DATE: 01/10/2019    PRIMARY DIAGNOSES:  Multilevel lumbar spinal stenosis with L3-4 and L4-L5 spondylolisthesis    PROCEDURES PERFORMED:  L2-L5 bilateral laminectomy, but only an L3-5 interbody fusion with interbody device and posterolateral fusion with segmental spinal instrumentation. HISTORY OF PRESENT ILLNESS:  The patient is a 40-year-old female who had spinal stenosis, spondylolisthesis, who had insidious onset of pain that progressively worsened that was not responsive to epidural blocks, pain medication, activity restrictions, so she is brought for surgery. HOSPITAL COURSE:  She was admitted on 2019, taken to the operating room where she underwent her L2-5 laminectomy and her L3-5 interbody and posterolateral fusion with interbody device and posterior instrumentation. She tolerated this well and postoperatively she was taken to the floor. She did have postsurgical blood loss anemia and was transfused 2 units of packed red blood cells and by the date of discharge, her hemoglobin was 8.8. She remained neurologically intact. She was able to void once her Anthony was removed. She was able to ambulate with physical therapy and by postop day #3, she was independent and felt to be ready for discharge. She had a Hemovac drain, which was removed on postoperative day 2 without complication. She was sent home and her dressing was changed prior to leaving. She is supposed to leave it in place for 5-6 days and then she can remove it as long as there is no ongoing drainage. If there is, she should replace it. She may take a shower. No tub baths. She is to wear a brace whenever she is out of bed. She is to avoid any strenuous activity, to walk as much as possible and she was given a prescription for Norco for pain.   She will be followed up in the office in 2 weeks' time. If there are problems prior, she will give us a call.     DISPOSITION:  See above      Shoaib Reynoso III, MD SEL/CORINNE  D: 01/14/2019 14:02     T: 01/15/2019 05:24  JOB #: [id number]  CC: Siddhartha Clemens MD

## 2019-01-23 ENCOUNTER — PATIENT OUTREACH (OUTPATIENT)
Dept: CASE MANAGEMENT | Age: 64
End: 2019-01-23

## 2019-01-23 NOTE — PROGRESS NOTES
This note will not be viewable in [address] 3rd attempt to contact patient for [facility] Call. No answer, left  for returned call. Episode will be closed at this time as [address] has been unsuccessful in reaching the patient. Monica Denis

## 2019-02-20 ENCOUNTER — HOSPITAL ENCOUNTER (OUTPATIENT)
Dept: PHYSICAL THERAPY | Age: 64
Discharge: HOME | End: 2019-02-20
Payer: MEDICARE

## 2019-02-20 PROCEDURE — 97162 PT EVAL MOD COMPLEX 30 MIN: CPT

## 2019-02-20 NOTE — THERAPY EVALUATION
Scott Gamino  : 1955      [place] / [place] / Product Type: Medicare /  Aguilar Haste at [address] [place] [address]. [facility], [address], [facility], [address] [place] [address]  [phone]   [phone]       OUTPATIENT PHYSICAL THERAPY:Initial Assessment 2019    ICD-10: Treatment Diagnosis:    Low back pain (M54.5)               Difficulty walking (M62.81)          Muscle weakness (R26.2)           Precautions/Allergies:   Latex, natural rubber; Celebrex [celecoxib]; and Mirtazapine   Fall Risk Score: 7 (? 5 = High Risk)  MD Orders: Eval and Treat  MEDICAL/REFERRING DIAGNOSIS:  Arthrodesis status [Z98.1]   DATE OF ONSET: 2019  REFERRING PHYSICIAN: Graham Soto MD  RETURN PHYSICIAN APPOINTMENT: 2019     INITIAL ASSESSMENT:  Ms. Scott Gamino presents to physical therapy with decreased postural and hip/core strength, ROM, joint mobility, flexibility, functional mobility, and increased pain. These S/S are consistent with chronic low back pain and post op laminectomy and fusion. Scott Gamino will benefit from skilled physical therapy (medically necessary) to address above deficits affecting participation in basic ADLs and functional mobility/tolerance. Patient will benefit from pain science education, manual therapeutic techniques (stretching,  soft tissue mobilization/myofascial release), therapeutic exercises and activities, postural strengthening/education, and comprehensive home exercises program to address current impairments and functional limitations. PROBLEM LIST (Impacting functional limitations):  1. Decreased Strength  2. Decreased ADL/Functional Activities  3. Decreased Transfer Abilities  4. Decreased Ambulation Ability/Technique  5. Decreased Balance  6. Increased Pain  7. Decreased Activity Tolerance  8. Increased Fatigue  9. Decreased Flexibility/Joint Mobility  10.  Decreased [place] with Home Exercise Program INTERVENTIONS PLANNED:  1. Balance Exercise  2. Patient education  3. Home Exercise Program (HEP)  4. Manual Therapy (soft tissue mobilization)  5. Neuromuscular Re-education/Strengthening  6. Range of Motion (ROM)  7. Therapeutic Activites  8. Therapeutic Exercise/Strengthening  9. Transfer Training     TREATMENT PLAN:  Effective Dates: 2/20/19 TO 4/21/2019 (60 days). Frequency/Duration: 2 times a week for 60 Days  GOALS: (Goals have been discussed and agreed upon with patient.)  Short Term Goals 4 weeks   1. Jose Washington will be independent with HEP  2. Jose Washington will participate in LE stretching program to increase flexibility  3. Jose Washington will participate in core stabilization exercises to help with stabilization during ADLs  4. Jose Washington will participate in LE strengthening program with weights/resistance as appropriate to help with gait and elevations  5. Jose Washington will participate in static and dynamic balance activities to decrease the risk for falls   6. Jose Washington will tolerate manual therapy/soft tissue mobilization to increase ROM and decrease pain  7. Long Term Goals 8 weeks   1. Jose Washington will demonstrate a 10 point improvement on the Oswestry to show improvement in function  2. Jose Washington will report 3/10 pain at rest and during ADLs  3. Jose Washington will demonstrate 5/5 LE strength on manual muscle testing  4. Jose Washington will have good understanding of pain science principles in order to self manage her chronic pain symptoms  Rehabilitation Potential For Stated Goals: Good  Regarding [place] therapy, I certify that the treatment plan above will be carried out by a therapist or under their direction.   Thank you for this referral,  Espinoza Whitney PT T     Referring Physician Signature: Ricky Car MD              Date                    HISTORY:   History of Present Injury/Illness (Reason for Referral):  Client states that she has had chronic back pain since fracturing her pelvis about 4 years ago. She states that the back pain got much worse after she had a cervical fusion in 2018. Prior to lumbar fusion she had severe left sided sciatica. -Present symptoms/complaints (on day of evaluation) stiffness, sorness, fatique  Pain Scale:  · Current: 7/10  · Best: 3/10 lying down  · Worst: 7/10 standing      · Aggravating factors: prolonged standing, walking, bending, stress   · Relieving factors: rest     Ambulatory/Rehab Services H2 Model Falls Risk Assessment    Risk Factors:       (2)  Symptomatic Depression       (5)  History of Recent Falls [w/in 3 months] Ability to Rise from Chair:       (0)  Ability to rise in a single movement    Falls Prevention Plan:       No modifications necessary   Total: (5 or greater = High Risk): 7    © [facility] of [employer] All Rights Reserved. [place] [employer] Patent #[id number]. Federal Law prohibits the replication, distribution or use without written permission from [facility] [employer]         Past Medical History/Comorbidities:   Ms. Ai Tate  has a past medical history of Abnormal Papanicolaou smear of cervix, Anemia, Arthritis, Bowel trouble, Chronic pain, Depression, Elective , Fatigue (2012), GERD (gastroesophageal reflux disease), Hypertension, Menopause, Miscarriage, Neck pain (2012), Pain in breast, Pelvis fracture, right ([address] Psychiatric disorder, Reflux esophagitis (2012), and Sinus congestion (2014). Ms. Ai Tate  has a past surgical history that includes us guided core breast biopsy (); hx colonoscopy (2009); hx endoscopy; hx orthopaedic (Left, ); hx cataract removal (Bilateral, ); and pr neurological procedure unlisted (10/17/2018). Social History/Living Environment:    Ms Ai Tate reports that her  is seriously ill, and needs a heart transplant. She has a significant amount of stress at home.       Social History Socioeconomic History    Marital status:      Spouse name: Not on file    Number of children: Not on file    Years of education: Not on file    Highest education level: Not on file   Social Needs    Financial resource strain: Not on file    Food insecurity - worry: Not on file    Food insecurity - inability: Not on file    Transportation needs - medical: Not on file    Transportation needs - non-medical: Not on file   Occupational History    Not on file   Tobacco Use    Smoking status: Never Smoker    Smokeless tobacco: Never Used   Substance and Sexual Activity    Alcohol use: No    Drug use: No    Sexual activity: Yes     Partners: Male     Birth control/protection: None     Comment: PM   Other Topics Concern    Not on file   Social History Narrative    Not on file     Prior Level of Function/Work/Activity:    Previous Treatment Approach  Pain management   Dominant Side right    Active Ambulatory Problems     Diagnosis Date Noted    Hypertension 08/25/2012    Fatigue 08/25/2012    Reflux esophagitis 08/25/2012    Neck pain 08/25/2012    Sinus congestion 12/23/2014    Microhematuria 07/13/2015    B12 deficiency 08/11/2015    Renal cyst, congenital, right 08/25/2015    Depression with anxiety 05/31/2016    Hx of fracture of pelvis 05/31/2016    Back pain 05/31/2016    Post-viral cough syndrome 10/24/2017    Chronic narcotic use 12/05/2017    Other chronic pain 04/17/2018    Spinal stenosis of lumbar region with neurogenic claudication 08/28/2018    Radiculopathy affecting upper extremity 08/28/2018    Stenosis of cervical spine with myelopathy 10/17/2018    Spinal stenosis 01/07/2019    Acute blood loss as cause of postoperative anemia 01/09/2019     Resolved Ambulatory Problems     Diagnosis Date Noted    Chronic right shoulder pain 11/08/2017     Past Medical History:   Diagnosis Date    Abnormal Papanicolaou smear of cervix     Anemia     Arthritis     Bowel trouble     Chronic pain     Depression     Elective      Fatigue 2012    GERD (gastroesophageal reflux disease)     Hypertension     Menopause     Miscarriage     Neck pain 2012    Pain in breast     Pelvis fracture, right (HCC)     Psychiatric disorder     Reflux esophagitis 2012    Sinus congestion 2014     Note: Patient denies any increase of symptoms with cough, sneeze or valsalva. Patient denies any saddle paresthesia or bowel/bladder deficits. Current Medications:    Current Outpatient Medications:     zolpidem (AMBIEN) 10 mg tablet, 1 tablet QHS, Disp: 30 Tab, Rfl: 2    HYDROcodone-acetaminophen (NORCO) 7.5-325 mg per tablet, Take 1 Tab by mouth every four (4) hours as needed. Max Daily Amount: 6 Tabs., Disp: 50 Tab, Rfl: 0    medroxyPROGESTERone (PROVERA) 2.5 mg tablet, Take 2.5 mg by mouth daily. , Disp: , Rfl:     diphenhydrAMINE (BENADRYL ALLERGY) 25 mg tablet, Take 25 mg by mouth two (2) times a day., Disp: , Rfl:     LORazepam (ATIVAN) 1 mg tablet, Take 1 Tab by mouth two (2) times daily as needed for Anxiety. Max Daily Amount: 2 mg., Disp: 60 Tab, Rfl: 0    gabapentin (NEURONTIN) 100 mg capsule, 1 PO QAM and midday. 2 PO QHS. , Disp: 120 Cap, Rfl: 0    conjugated estrogens (PREMARIN) 1.25 mg tablet, TAKE 1 TABLET BY MOUTH DAILY, Disp: 90 Tab, Rfl: 3    amLODIPine (NORVASC) 5 mg tablet, TAKE ONE TABLET BY MOUTH ONE TIME DAILY, Disp: 90 Tab, Rfl: 3    pantoprazole (PROTONIX) 40 mg tablet, TAKE 1 TABLET BY MOUTH DAILY, Disp: 90 Tab, Rfl: 3    valsartan (DIOVAN) 160 mg tablet, TAKE ONE TABLET BY MOUTH ONE TIME DAILY, Disp: 90 Tab, Rfl: 3    buPROPion XL (WELLBUTRIN XL) 300 mg XL tablet, TAKE ONE TABLET BY MOUTH ONE TIME DAILY, Disp: 90 Tab, Rfl: 3    meloxicam (MOBIC) 7.5 mg tablet, Take 7.5 mg by mouth daily. , Disp: , Rfl:     cholecalciferol (VITAMIN D3) 1,000 unit tablet, Take 5,000 Units by mouth daily. , Disp: , Rfl:     vitamin e (E GEMS) 1,000 unit capsule, Take 1,000 Units by mouth daily. , Disp: , Rfl:      Date Last Reviewed:  2/20/2019   Number of Personal Factors/Comorbidities that affect the Plan of Care: 1-2: MODERATE COMPLEXITY   EXAMINATION:   Observation/Orthostatic Postural Assessment:          Normal gait pattern without assistive device  Palpation:          Immature surgical scar lumbar spine residual swelling localized superior  scar  ROM:            AROM/PROM         Joint: Eval Date:  2/20/19  Re-Assess Date:  Re-Assess Date:    Active ROM RIGHT LEFT RIGHT LEFT RIGHT LEFT   Knee Extension WNL WNL       Knee Flexion WNL WNL       Hip Flexion WNL WNL       Hip Abduction WNL WNL       Lumbar Flexion 40        Lumbar Extension 10        Lumbar Side-bending 15 10       Lumbar Rotation           Strength:     Eval Date:  Re-Assess Date:  Re-Assess Date:      RIGHT LEFT RIGHT LEFT RIGHT LEFT   Knee Flexion (L5-S2) 5-/5 5-/5       Knee Extension (L3, L4) 5-/5 4/5       Hip Flexion (L1, L2) 4/5 4/5       Hip Extension         Hip Abduction (L5, S1) 3+/5 3+/5       Ankle Dorsiflexion  5/5 3/5       Great Toe Extension (L5) 5 5       Ankle Plantar Flexion (S1-S2) 5 5           Single leg stance right/left: 20 sec + bilateral              Deep squat: not tested    Ham 90/90:   RIGHT LE: not assessed today   LEFT LE: NA    Special Tests:   · TUG TEST: 9.56 sec  · 30 second sit to stand test: 12 reps    Functional Mobility:  Affecting participation in basic ADLs and functional tasks. Balance:     Single Leg Stance: R= 20/ L= 20   Body Structures Involved:  1. Bones  2. Joints  3. Muscles   Body Functions Affected:  1. Sensory/Pain  2. Neuromusculoskeletal  3. Movement Related Activities and Participation Affected:  1. Mobility  2.  Self Care   Number of elements that affect the Plan of Care: 3: MODERATE COMPLEXITY   CLINICAL PRESENTATION:   Presentation: Evolving clinical presentation with changing clinical characteristics: MODERATE COMPLEXITY   CLINICAL DECISION MAKING:   Outcome Measure: Tool Used: Modified Oswestry Low Back Pain Questionnaire  Score:  Initial: 26/50  Most Recent: X/50 (Date: -- )   Interpretation of Score: Each section is scored on a 0-5 scale, 5 representing the greatest disability. The scores of each section are added together for a total score of 50. Score 0 1-10 11-20 21-30 31-40 41-49 50   Modifier CH CI CJ CK CL CM CN     ? Mobility - Walking and Moving Around:     - CURRENT STATUS: CK - 40%-59% impaired, limited or restricted    - GOAL STATUS: CJ - 20%-39% impaired, limited or restricted    - D/C STATUS:  ---------------To be determined---------------    Medical Necessity:   · Skilled intervention continues to be required due to above deficits affecting participation in basic ADLs and overall functional tolerance. Reason for Services/Other Comments:  · Patient continues to require skilled intervention due to  above deficits affecting participation in basic ADLs and overall functional tolerance. Use of outcome tool(s) and clinical judgement create a POC that gives a: Questionable prediction of patient's progress: MODERATE COMPLEXITY   TREATMENT:   (In addition to Assessment/Re-Assessment sessions the following treatments were rendered)    · Pre-Treatment Pain/ Symptoms: See above report in Initial Assessment   7/10     THERAPEUTIC EXERCISE: (15 minutes):  Exercises per progress note to improve mobility, strength and balance. Required minimal visual and verbal cues to promote proper body alignment and promote proper body posture. Progressed resistance, range and complexity of movement as indicated. [employer] Portal      Treatment/Session Assessment:  Dayron Card verbalized understanding of role of PT and POC. · Post Treatment Pain/ Symptoms: 6  6/10 ·   Compliance with Program/Exercises: Will assess as treatment progresses. · Recommendations/Intent for next treatment session:  \"Next visit will focus on advancements to more challenging activities\".     Future Appointments   Date Time Provider Carolann Tracy   3/18/2019  2:30 PM Joy Frost MD [address] [facility] 10 [facility]       Total Treatment Duration:       Anupam Vazquez, RT          remember to save as eval note

## 2019-02-27 ENCOUNTER — HOSPITAL ENCOUNTER (OUTPATIENT)
Dept: PHYSICAL THERAPY | Age: 64
Discharge: HOME | End: 2019-02-27
Payer: MEDICARE

## 2019-02-27 PROCEDURE — 97530 THERAPEUTIC ACTIVITIES: CPT

## 2019-02-27 NOTE — PROGRESS NOTES
Jose Guadalupe Narvaez  : 1955  [place] / Product Type: Medicare /  [facility] at [address] [place] [address]. [address] [facility] [address] [place] [address], [address]  [phone]   [phone]                                                                                                                                               Liban Foreman MD      OUTPATIENT PHYSICAL THERAPY: Daily Treatment Note 2019 Visit Count:  2    Pre-treatment Symptoms/Complaints:  I hurt all over, back feels really stiff. But I don't stop moving, can't sit still. Pain: Initial:   5-610 pain, Stress level 10/10. Patient states that her  was rejected from an organ transplant list and is extremely ill. He needs a new heart and kidney. Post Session:  4/10   Medications Last Reviewed:  19  Updated Objective Findings:  client very agitated, difficulty focusing, note shallow breathing pattern, constantly changing position in chair. TREATMENT:   THERAPEUTIC EXERCISE: (0 minutes):  Exercises per grid below to improve mobility, strength and balance. Required minimal visual, verbal and manual cues to promote proper body alignment and promote proper body posture. Progressed resistance and complexity of movement as indicated. Date:   Date:   Date:     Activity/Exercise Parameters Parameters Parameters                                               Therapeutic Activity (    40): Therapeutic activities per grid below to improve mobility. Required moderate verbal cues to promote muscle relaxation, stress and pain reduction. Date:  19 Date:   Date:     Activity/Exercise Parameters Parameters Parameters   Patient education Pain science principles, relationship between stress and pain, benefits of ex. Breathing tech/relaxation tech Mindfulness meditation with focus on breathing                                     .     MANUAL THERAPY: (0 minutes): Joint mobilization was utilized and necessary because of the patient's restricted joint motion and painful spasm. [email or web address] Portal  Treatment/Session Summary:    · Response to Treatment:  note decrease in pain, improved breathing tech, decrease in muscle guarding and overall agitiation. Patient seems to understand relationship between pain and stress. · Communication/Consultation:  patient has been instructed to down load meditation david, begin ideally with 20 min a day broken into 2 10 min sessions. She also is continue daily walking  · Equipment provided today:  None today  Recommendations/Intent for next treatment session: Next visit will focus on pain science instruction, relaxation tech, begin AROM ex for spine and extremities. Treatment Plan of Care Effective Dates: 2/20/19 TO 3/30/2019 (30 days).   Frequency/Duration: 2 times a week for 30 Days  Total Treatment Billable Duration:  40  PT Patient Time In/Time Out  Time In: 1518  Time Out: RT Barbara    Future Appointments   Date Time Provider Carolann Molina   3/6/2019  1:45 PM Lupis Vazquez Daily, RT [facility]   3/18/2019  2:30 PM Celena Resendiz MD [facility]

## 2019-03-06 ENCOUNTER — APPOINTMENT (OUTPATIENT)
Dept: PHYSICAL THERAPY | Age: 64
End: 2019-03-06

## 2019-04-16 NOTE — THERAPY DISCHARGE
Briana Hdez  : 1955      [place] / Product Type: Medicare /  [facility] [address] [address] at [address]. [facility], [address], [place], [address] [place] [address] Road  [phone]   [phone]       OUTPATIENT PHYSICAL THERAPY:Discharge 2019    ICD-10: Treatment Diagnosis:    Low back pain (M54.5)               Difficulty walking (M62.81)          Muscle weakness (R26.2)           Precautions/Allergies:   Latex, natural rubber; Celebrex [celecoxib]; and Mirtazapine   Fall Risk Score: 7 (? 5 = High Risk)  MD Orders: Eval and Treat  MEDICAL/REFERRING DIAGNOSIS:  Arthrodesis status [Z98.1]   DATE OF ONSET: 2019  REFERRING PHYSICIAN: Michaela Gordon MD  RETURN PHYSICIAN APPOINTMENT: 2019     FINAL ASSESSMENT:  Ms. Briana Hdez presented to physical therapy with decreased postural and hip/core strength, ROM, joint mobility, flexibility, functional mobility, and increased pain. These S/S were consistent with chronic low back pain and post op laminectomy and fusion. She was seen for 2 visits total including the initial evaluation.       Briana Hdez failed to show for her scheduled appointments and was discharged due to non-compliance with program.    Sylvia Lambert PT CHT

## 2019-11-22 ENCOUNTER — HOSPITAL ENCOUNTER (OUTPATIENT)
Dept: NUCLEAR MEDICINE | Age: 64
Discharge: HOME | End: 2019-11-22
Attending: ORTHOPAEDIC SURGERY
Payer: MEDICARE

## 2019-11-22 DIAGNOSIS — R10.2 PELVIC PAIN: ICD-10-CM

## 2019-11-22 DIAGNOSIS — M54.50 LOW BACK PAIN: ICD-10-CM

## 2019-11-22 DIAGNOSIS — M53.3 SACROILIAC JOINT PAIN: ICD-10-CM

## 2019-11-22 PROCEDURE — 78306 BONE IMAGING WHOLE BODY: CPT

## 2019-12-09 ENCOUNTER — HOSPITAL ENCOUNTER (OUTPATIENT)
Dept: GENERAL RADIOLOGY | Age: 64
Discharge: HOME | End: 2019-12-09
Payer: MEDICARE

## 2019-12-09 DIAGNOSIS — R52 PAIN: ICD-10-CM

## 2019-12-09 PROCEDURE — 73552 X-RAY EXAM OF FEMUR 2/>: CPT

## 2019-12-16 NOTE — PROGRESS NOTES
Spoke with pt she explained that  called her directly with results. Pt will be in office tomorrow to see Geno Bartlett and will review with her at that time.

## 2020-02-11 ENCOUNTER — APPOINTMENT (OUTPATIENT)
Dept: CT IMAGING | Age: 65
End: 2020-02-11
Attending: EMERGENCY MEDICINE
Payer: MEDICARE

## 2020-02-11 ENCOUNTER — HOSPITAL ENCOUNTER (EMERGENCY)
Age: 65
Discharge: HOME | End: 2020-02-11
Attending: EMERGENCY MEDICINE
Payer: MEDICARE

## 2020-02-11 VITALS
BODY MASS INDEX: 25.13 KG/M2 | SYSTOLIC BLOOD PRESSURE: 125 MMHG | RESPIRATION RATE: 18 BRPM | WEIGHT: 128 LBS | HEART RATE: 98 BPM | HEIGHT: 60 IN | OXYGEN SATURATION: 99 % | DIASTOLIC BLOOD PRESSURE: 74 MMHG

## 2020-02-11 DIAGNOSIS — K52.9 COLITIS: Primary | ICD-10-CM

## 2020-02-11 LAB
ALBUMIN SERPL-MCNC: 3.1 G/DL (ref 3.2–4.6)
ALBUMIN/GLOB SERPL: 0.8 {RATIO} (ref 1.2–3.5)
ALP SERPL-CCNC: 61 U/L (ref 50–136)
ALT SERPL-CCNC: 15 U/L (ref 12–65)
ANION GAP SERPL CALC-SCNC: 6 MMOL/L (ref 7–16)
AST SERPL-CCNC: 12 U/L (ref 15–37)
BASOPHILS # BLD: 0 K/UL (ref 0–0.2)
BASOPHILS NFR BLD: 0 % (ref 0–2)
BILIRUB SERPL-MCNC: 0.5 MG/DL (ref 0.2–1.1)
BUN SERPL-MCNC: 21 MG/DL (ref 8–23)
CALCIUM SERPL-MCNC: 8.8 MG/DL (ref 8.3–10.4)
CHLORIDE SERPL-SCNC: 106 MMOL/L (ref 98–107)
CO2 SERPL-SCNC: 27 MMOL/L (ref 21–32)
CREAT SERPL-MCNC: 0.77 MG/DL (ref 0.6–1)
DIFFERENTIAL METHOD BLD: ABNORMAL
EOSINOPHIL # BLD: 0 K/UL (ref 0–0.8)
EOSINOPHIL NFR BLD: 0 % (ref 0.5–7.8)
ERYTHROCYTE [DISTWIDTH] IN BLOOD BY AUTOMATED COUNT: 17.9 % (ref 11.9–14.6)
GLOBULIN SER CALC-MCNC: 3.9 G/DL (ref 2.3–3.5)
GLUCOSE SERPL-MCNC: 116 MG/DL (ref 65–100)
HCT VFR BLD AUTO: 34.9 % (ref 35.8–46.3)
HGB BLD-MCNC: 10.6 G/DL (ref 11.7–15.4)
IMM GRANULOCYTES # BLD AUTO: 0.1 K/UL (ref 0–0.5)
IMM GRANULOCYTES NFR BLD AUTO: 0 % (ref 0–5)
LYMPHOCYTES # BLD: 0.2 K/UL (ref 0.5–4.6)
LYMPHOCYTES NFR BLD: 1 % (ref 13–44)
MAGNESIUM SERPL-MCNC: 1.8 MG/DL (ref 1.8–2.4)
MCH RBC QN AUTO: 23.9 PG (ref 26.1–32.9)
MCHC RBC AUTO-ENTMCNC: 30.4 G/DL (ref 31.4–35)
MCV RBC AUTO: 78.8 FL (ref 79.6–97.8)
MONOCYTES # BLD: 0.3 K/UL (ref 0.1–1.3)
MONOCYTES NFR BLD: 2 % (ref 4–12)
NEUTS SEG # BLD: 13.6 K/UL (ref 1.7–8.2)
NEUTS SEG NFR BLD: 96 % (ref 43–78)
NRBC # BLD: 0 K/UL (ref 0–0.2)
PLATELET # BLD AUTO: 442 K/UL (ref 150–450)
PMV BLD AUTO: 10.2 FL (ref 9.4–12.3)
POTASSIUM SERPL-SCNC: 4.1 MMOL/L (ref 3.5–5.1)
PROT SERPL-MCNC: 7 G/DL (ref 6.3–8.2)
RBC # BLD AUTO: 4.43 M/UL (ref 4.05–5.2)
SODIUM SERPL-SCNC: 139 MMOL/L (ref 136–145)
WBC # BLD AUTO: 14.2 K/UL (ref 4.3–11.1)

## 2020-02-11 PROCEDURE — 96361 HYDRATE IV INFUSION ADD-ON: CPT

## 2020-02-11 PROCEDURE — 85025 COMPLETE CBC W/AUTO DIFF WBC: CPT

## 2020-02-11 PROCEDURE — 74011000258 HC RX REV CODE- 258: Performed by: EMERGENCY MEDICINE

## 2020-02-11 PROCEDURE — 96374 THER/PROPH/DIAG INJ IV PUSH: CPT

## 2020-02-11 PROCEDURE — 99284 EMERGENCY DEPT VISIT MOD MDM: CPT

## 2020-02-11 PROCEDURE — 74177 CT ABD & PELVIS W/CONTRAST: CPT

## 2020-02-11 PROCEDURE — 74011250636 HC RX REV CODE- 250/636: Performed by: EMERGENCY MEDICINE

## 2020-02-11 PROCEDURE — 74011636320 HC RX REV CODE- 636/320: Performed by: EMERGENCY MEDICINE

## 2020-02-11 PROCEDURE — 80053 COMPREHEN METABOLIC PANEL: CPT

## 2020-02-11 PROCEDURE — 83735 ASSAY OF MAGNESIUM: CPT

## 2020-02-11 PROCEDURE — 74011250637 HC RX REV CODE- 250/637: Performed by: EMERGENCY MEDICINE

## 2020-02-11 RX ORDER — ONDANSETRON 4 MG/1
4 TABLET, ORALLY DISINTEGRATING ORAL
Qty: 15 TAB | Refills: 0 | Status: SHIPPED | OUTPATIENT
Start: 2020-02-11 | End: 2020-03-04 | Stop reason: SDUPTHER

## 2020-02-11 RX ORDER — ONDANSETRON 2 MG/ML
4 INJECTION INTRAMUSCULAR; INTRAVENOUS
Status: COMPLETED | OUTPATIENT
Start: 2020-02-11 | End: 2020-02-11

## 2020-02-11 RX ORDER — HYOSCYAMINE SULFATE 0.12 MG/1
0.12 TABLET SUBLINGUAL
Status: COMPLETED | OUTPATIENT
Start: 2020-02-11 | End: 2020-02-11

## 2020-02-11 RX ORDER — METRONIDAZOLE 500 MG/1
500 TABLET ORAL 2 TIMES DAILY
Qty: 14 TAB | Refills: 0 | Status: SHIPPED | OUTPATIENT
Start: 2020-02-11 | End: 2020-02-18

## 2020-02-11 RX ORDER — CIPROFLOXACIN 500 MG/1
500 TABLET ORAL 2 TIMES DAILY
Qty: 14 TAB | Refills: 0 | Status: SHIPPED | OUTPATIENT
Start: 2020-02-11 | End: 2020-02-18

## 2020-02-11 RX ORDER — SODIUM CHLORIDE 0.9 % (FLUSH) 0.9 %
10 SYRINGE (ML) INJECTION
Status: COMPLETED | OUTPATIENT
Start: 2020-02-11 | End: 2020-02-11

## 2020-02-11 RX ORDER — HYOSCYAMINE SULFATE 0.12 MG/1
0.12 TABLET SUBLINGUAL
Qty: 20 TAB | Refills: 0 | Status: SHIPPED | OUTPATIENT
Start: 2020-02-11 | End: 2020-02-18

## 2020-02-11 RX ORDER — SODIUM CHLORIDE 9 MG/ML
1000 INJECTION, SOLUTION INTRAVENOUS ONCE
Status: COMPLETED | OUTPATIENT
Start: 2020-02-11 | End: 2020-02-11

## 2020-02-11 RX ADMIN — IOPAMIDOL 100 ML: 755 INJECTION, SOLUTION INTRAVENOUS at 18:53

## 2020-02-11 RX ADMIN — SODIUM CHLORIDE 100 ML: 900 INJECTION, SOLUTION INTRAVENOUS at 18:53

## 2020-02-11 RX ADMIN — SODIUM CHLORIDE 1000 ML: 900 INJECTION, SOLUTION INTRAVENOUS at 16:54

## 2020-02-11 RX ADMIN — HYOSCYAMINE SULFATE 0.12 MG: 0.12 TABLET ORAL at 16:10

## 2020-02-11 RX ADMIN — ONDANSETRON 4 MG: 2 INJECTION INTRAMUSCULAR; INTRAVENOUS at 16:10

## 2020-02-11 RX ADMIN — Medication 10 ML: at 18:53

## 2020-02-11 NOTE — ED TRIAGE NOTES
Patient reports nausea and diarrhea since 0400 this morning. Brought in via EMS from home. No medications given en route.

## 2020-02-11 NOTE — ED PROVIDER NOTES
[address] [place] [address] Emergency Department  Arrival Date/Time: No admission date for patient encounter. Olivier Miller  MRN: [id number]   YOB: 1955   75 y.o. female   [facility] EMERGENCY DEPT Room/bed info not found  Seen on 2/11/2020 @ 4:10 PM     Today's Chief Complaint: Patient presents with:  Diarrhea      TRIAGE Provider NOTE:  Gastroenteritis sx's onset about 01:00 today. Multiple vomiting and diarrhea with subjective fever. No ill contacts or recent abx use. No hematemesis or hematachezia. Diffuse abdominal pain. EXAM: abdomen soft/nt; mucus membranes dry. Mela Phalen, DO; 2/11/2020 @4:10 PM============================    Olivier Miller is a 75 y.o. female seen on 2/11/2020 at 4:10 PM in the [facility] EMERGENCY DEPT   HPI:   [unfilled]    Review of Systems: @ROSPingboardAGE@    Past Medical History: Primary Care Doctor: Cynthia Beltran MD  Meds, PMH, PSHx, SocHx at end of this note    Allergies:  -- Latex, Natural Rubber -- Itching   -- Celebrex (Celecoxib) -- Other (comments)    --  constipation   -- Mirtazapine -- Drowsiness    --  Greater than expected reaction   Key Anti-Platelet Anticoagulant Meds     The patient is on no antiplatelet meds or anticoagulants. Physical Exam:  Nursing documentation reviewed. --------------------            02/11/20               1607     --------------------   BP:       123/79     Pulse:   (!) 108     Resp:       17       SpO2:      99%      -------------------- Vital signs were reviewed. [facility]    MEDICAL DECISION MAKING:  Differential Diagnosis:   @[facility]@     Data:     Lab findings during this visit: No results found for this or any previous visit (from the past 24 hour(s)).      Radiology studies during this visit: No orders to display     Medications given in the ED: Medications  0.9% sodium chloride infusion 1,000 mL (has no administration in time range)   ondansetron (ZOFRAN) injection 4 mg (has no administration in time range)  hyoscyamine SL (LEVSIN/SL) tablet 0.125 mg (has no administration in time range)     Procedure Documentation: [unfilled]    Assessment and Plan:     Other ED Course Notes:       Past Medical History:   Past Medical History:  No date: Abnormal Papanicolaou smear of cervix      Comment:  ASCUS  No date: Anemia  No date: Arthritis      Comment:  pt says if she has it it's mild  No date: Bowel trouble  No date: Chronic pain      Comment:  lumbar/cervical  No date: Depression  No date: Ear problems  No date: Elective   2012: Fatigue  No date: GERD (gastroesophageal reflux disease)      Comment:  controlled with med  2019: History of lumbar laminectomy      Comment:  L3-5  No date: Hypertension      Comment:  controlled with med  No date: Menopause  No date: Miscarriage  2012: Neck pain  No date: Pain in breast      Comment:  RIGHT  No date: Pelvis fracture, right ([address]      Comment:  onset age 62  No date: Psychiatric disorder      Comment:  depression and anxiety  2012: Reflux esophagitis  2014: Sinus congestion  Past Surgical History:  2017: HX CATARACT REMOVAL; Bilateral      Comment:  with lens replacement  2009: HX COLONOSCOPY  No date: HX ENDOSCOPY  1998: HX ORTHOPAEDIC; Left      Comment:  FOOT- pin in great toe for not being able to move it  10/17/2018: NEUROLOGICAL PROCEDURE UNLISTED      Comment:  cervical fusion, anterior and posterior  2016: US GUIDED CORE BREAST BIOPSY      Comment:  negative  Social History    Tobacco Use      Smoking status: Never Smoker      Smokeless tobacco: Never Used    Alcohol use: No    Drug use: No    Home Medication: This SmartLink can only be used in locations that support formatted text. 6:32 PM  Pt states >5 <10 episodes of n/v/d. Has had abd pain for some time prior to. The history is provided by the patient. Diarrhea    This is a new problem. Episode onset: 400.  The problem occurs constantly. The problem has not changed since onset. Associated symptoms include a fever (subj), diarrhea, nausea and vomiting. Nothing worsens the pain. The pain is relieved by nothing. Her past medical history is significant for irritable bowel syndrome. The patient's surgical history non-contributory.        Past Medical History:   Diagnosis Date    Abnormal Papanicolaou smear of cervix     ASCUS    Anemia     Arthritis     pt says if she has it it's mild    Bowel trouble     Chronic pain     lumbar/cervical    Depression     Ear problems     Elective      Fatigue 2012    GERD (gastroesophageal reflux disease)     controlled with med    History of lumbar laminectomy 2019    L3-5    Hypertension     controlled with med    Menopause     Miscarriage     Neck pain 2012    Pain in breast     RIGHT    Pelvis fracture, right [facility])     onset age 62    Psychiatric disorder     depression and anxiety    Reflux esophagitis 2012    Sinus congestion 2014       Past Surgical History:   Procedure Laterality Date    HX CATARACT REMOVAL Bilateral 2017    with lens replacement    HX COLONOSCOPY  2009    HX ENDOSCOPY      HX ORTHOPAEDIC Left     FOOT- pin in great toe for not being able to move it    NEUROLOGICAL PROCEDURE UNLISTED  10/17/2018    cervical fusion, anterior and posterior    US GUIDED CORE BREAST BIOPSY  2016    negative         Family History:   Problem Relation Age of Onset    Breast Cancer Mother 80    Heart Disease Mother     Hypertension Mother    Ricky Chinchilla Arthritis-rheumatoid Brother     Breast Cancer Sister 62        HAD BILATERAL MASTECTOMY 2013    Cancer Sister         breast    Anemia Father         Pernicious anemia    Heart Disease Father        Social History     Socioeconomic History    Marital status:      Spouse name: Not on file    Number of children: Not on file    Years of education: Not on file    Highest education level: Not on file   Occupational History    Not on file   Social Needs    Financial resource strain: Not on file    Food insecurity:     Worry: Not on file     Inability: Not on file    Transportation needs:     Medical: Not on file     Non-medical: Not on file   Tobacco Use    Smoking status: Never Smoker    Smokeless tobacco: Never Used   Substance and Sexual Activity    Alcohol use: No    Drug use: No    Sexual activity: Yes     Partners: Male     Birth control/protection: None     Comment: PM   Lifestyle    Physical activity:     Days per week: Not on file     Minutes per session: Not on file    Stress: Not on file   Relationships    Social connections:     Talks on phone: Not on file     Gets together: Not on file     Attends Mu-ism service: Not on file     Active member of club or organization: Not on file     Attends meetings of clubs or organizations: Not on file     Relationship status: Not on file    Intimate partner violence:     Fear of current or ex partner: Not on file     Emotionally abused: Not on file     Physically abused: Not on file     Forced sexual activity: Not on file   Other Topics Concern    Not on file   Social History Narrative    Not on file         ALLERGIES: Latex, natural rubber; Celebrex [celecoxib]; and Mirtazapine    Review of Systems   Constitutional: Positive for fever (subj). Negative for chills. Gastrointestinal: Positive for diarrhea, nausea and vomiting. All other systems reviewed and are negative. Vitals:    02/11/20 1607   BP: 123/79   Pulse: (!) 108   Resp: 17   SpO2: 99%   Weight: 58.1 kg (128 lb)   Height: 5' (1.524 m)            Physical Exam  Vitals signs and nursing note reviewed. Constitutional:       General: She is not in acute distress. Appearance: Normal appearance. She is not ill-appearing. HENT:      Head: Normocephalic and atraumatic. Mouth/Throat:      Mouth: Mucous membranes are dry.    Neck:      Musculoskeletal: Normal range of motion. Cardiovascular:      Rate and Rhythm: Normal rate and regular rhythm. Pulmonary:      Effort: Pulmonary effort is normal.      Breath sounds: Normal breath sounds. Abdominal:      General: Bowel sounds are normal.      Palpations: Abdomen is soft. Tenderness: There is no abdominal tenderness. There is no guarding or rebound. Musculoskeletal: Normal range of motion. General: No swelling. Skin:     General: Skin is warm and dry. Neurological:      General: No focal deficit present. Mental Status: She is alert and oriented to person, place, and time.           MDM         Procedures

## 2020-02-12 NOTE — ED NOTES
I have reviewed discharge instructions with the patient. The patient verbalized understanding. Patient left ED via Discharge Method: ambulatory to Home with frriend). Opportunity for questions and clarification provided. Patient given 4 scripts. To continue your aftercare when you leave the hospital, you may receive an automated call from our care team to check in on how you are doing. This is a free service and part of our promise to provide the best care and service to meet your aftercare needs.  If you have questions, or wish to unsubscribe from this service please call [phone]. Thank you for Choosing our [facility] Emergency Department.

## 2020-02-12 NOTE — ED NOTES
I have reviewed discharge instructions with the patient and caregiver. The patient and caregiver verbalized understanding. Patient left ED via Discharge Method: wheelchair to Home with (insert name of family/friend, self, transport family). Opportunity for questions and clarification provided. Patient given 4 scripts. To continue your aftercare when you leave the hospital, you may receive an automated call from our care team to check in on how you are doing. This is a free service and part of our promise to provide the best care and service to meet your aftercare needs.  If you have questions, or wish to unsubscribe from this service please call [phone]. Thank you for Choosing our [facility] Emergency Department.

## 2020-02-12 NOTE — DISCHARGE INSTRUCTIONS
Patient Education     Colitis: Care Instructions  Your Care Instructions  Colitis is the medical term for swelling (inflammation) of the intestine. It can be caused by different things, such as an infection or loss of blood flow in the intestine. Other causes are problems like Crohn's disease or ulcerative colitis. Symptoms may include fever, diarrhea that may be bloody, or belly pain. Sometimes symptoms go away without treatment. But you may need treatment or more tests, such as blood tests or a stool test. Or you may need imaging tests like a CT scan or a colonoscopy. In some cases, the doctor may want to test a sample of tissue from the intestine. This test is called a biopsy. The doctor has checked you carefully, but problems can develop later. If you notice any problems or new symptoms, get medical treatment right away. Follow-up care is a key part of your treatment and safety. Be sure to make and go to all appointments, and call your doctor if you are having problems. It's also a good idea to know your test results and keep a list of the medicines you take. How can you care for yourself at home? · Rest until you feel better. · Your doctor may recommend that you eat bland foods. These include rice, dry toast or crackers, bananas, and applesauce. · To prevent dehydration, drink plenty of fluids. Choose water and other caffeine-free clear liquids until you feel better. If you have kidney, heart, or liver disease and have to limit fluids, talk with your doctor before you increase the amount of fluids you drink. · Be safe with medicines. Take your medicines exactly as prescribed. Call your doctor if you think you are having a problem with your medicine. You will get more details on the specific medicines your doctor prescribes. When should you call for help? Call 911 anytime you think you may need emergency care. For example, call if:  · You passed out (lost consciousness).   · You vomit blood or what looks like coffee grounds. · Your stools are maroon or very bloody. Call your doctor now or seek immediate medical care if:  · You have new or worse pain. · You have a new or higher fever. · You have new or worse symptoms. · You cannot keep fluids or medicines down. Watch closely for changes in your health, and be sure to contact your doctor if:  · You do not get better as expected. Where can you learn more? Go to [employer]  Enter [id number] in the search box to learn more about \"Colitis: Care Instructions. \"   © 4156-4818 Healthwise, Incorporated. Care instructions adapted under license by [facility] (which disclaims liability or warranty for this information). This care instruction is for use with your licensed healthcare professional. If you have questions about a medical condition or this instruction, always ask your healthcare professional. [address] any warranty or liability for your use of this information.   Content Version: 37.6.683242; Current as of: November 20, 2015

## 2020-02-17 PROBLEM — G99.2 STENOSIS OF CERVICAL SPINE WITH MYELOPATHY (HCC): Status: RESOLVED | Noted: 2018-10-17 | Resolved: 2020-02-17

## 2020-02-17 PROBLEM — M79.642 PAIN IN BOTH HANDS: Status: ACTIVE | Noted: 2018-10-03

## 2020-02-17 PROBLEM — I73.81 ERYTHROMELALGIA (HCC): Status: ACTIVE | Noted: 2018-10-03

## 2020-02-17 PROBLEM — M48.02 STENOSIS OF CERVICAL SPINE WITH MYELOPATHY (HCC): Status: RESOLVED | Noted: 2018-10-17 | Resolved: 2020-02-17

## 2020-02-17 PROBLEM — M51.36 DDD (DEGENERATIVE DISC DISEASE), LUMBAR: Status: ACTIVE | Noted: 2018-04-25

## 2020-02-17 PROBLEM — M54.12 CERVICAL RADICULOPATHY AT C5: Status: ACTIVE | Noted: 2018-03-14

## 2020-02-17 PROBLEM — R10.2 CHRONIC PELVIC PAIN IN FEMALE: Status: ACTIVE | Noted: 2017-11-08

## 2020-02-17 PROBLEM — R20.2 NUMBNESS AND TINGLING: Status: ACTIVE | Noted: 2018-05-15

## 2020-02-17 PROBLEM — M48.00 SPINAL STENOSIS: Status: RESOLVED | Noted: 2019-01-07 | Resolved: 2020-02-17

## 2020-02-17 PROBLEM — R20.0 NUMBNESS AND TINGLING: Status: ACTIVE | Noted: 2018-05-15

## 2020-02-17 PROBLEM — M79.641 PAIN IN BOTH HANDS: Status: ACTIVE | Noted: 2018-10-03

## 2020-02-17 PROBLEM — M48.02 CERVICAL SPINAL STENOSIS: Status: ACTIVE | Noted: 2018-05-15

## 2020-07-20 PROBLEM — I73.81 ERYTHROMELALGIA (HCC): Status: RESOLVED | Noted: 2018-10-03 | Resolved: 2020-07-20

## 2020-08-25 ENCOUNTER — HOSPITAL ENCOUNTER (OUTPATIENT)
Dept: PHYSICAL THERAPY | Age: 65
Discharge: HOME | End: 2020-08-25
Payer: MEDICARE

## 2020-08-25 PROCEDURE — 97110 THERAPEUTIC EXERCISES: CPT

## 2020-08-25 PROCEDURE — 97162 PT EVAL MOD COMPLEX 30 MIN: CPT

## 2020-08-25 NOTE — PROGRESS NOTES
Brooke Andrews  : 1955  [place]  Secondary: Yehuda Miner at [address], [address] [place] [address],[address], [place] [address].  [phone]   [phone]         OUTPATIENT PHYSICAL THERAPY: Daily Treatment Note 2020  Visit Count:  1    ICD-10: Treatment Diagnosis: Sacroiliitis, not elsewhere classified (M46.1); Precautions/Allergies:   Latex, natural rubber; Celecoxib; and Mirtazapine   TREATMENT PLAN:  Effective Dates: 2020 TO 2020 (90 days). Frequency/Duration: 2 times a week for 90 Day(s)    Pre-treatment Symptoms/Complaints:  LBP, Bilateral Hip Pain  Pain: Initial:   8/10 Post Session:  8/10   Medications Last Reviewed:  2020  Updated Objective Findings:  See evaluation note from today  TREATMENT:     THERAPEUTIC EXERCISE: (10 minutes):  Exercises per grid below to improve mobility and strength. Required minimal verbal cues to promote proper body alignment and promote proper body posture. Progressed resistance and repetitions as indicated. Date:  20 Date:   Date:     Activity/Exercise Parameters Parameters Parameters   SKTC 3 reps  20 sec holds  (HEP)     Active Hamstring Stretch 3 reps  20 sec holds  (HEP)     Pelvic Tilts 10 reps  5 sec holds  (HEP)                                 [facility] Portal  Treatment/Session Summary:    · Response to Treatment:  Pt tolerated all treatments well today with no c/o. .  · Communication/Consultation:  None today  · Equipment provided today:  None today  · Recommendations/Intent for next treatment session: Next visit will focus on progression of stretching/strengthening as tolerable, manual therapy.     Total Treatment Billable Duration:  10 minutes  PT Patient Time In/Time Out  Time In: 5729  Time Out: 1600  Gilma Hernadez PT    Future Appointments   Date Time Provider Carolann Molina   2020  1:00 PM Gregory Cuevas PT [facility]   2020 1:00 PM Shaji Norman, PT [facility]   9/9/2020  1:00 PM Lorraine Whitaker, PT [facility]   9/11/2020 10:30 AM Alcira Dan MD [facility]   9/11/2020  1:45 PM Tiffany Fitzpatrick, PT [facility]   9/15/2020  1:00 PM Lorraine Whitaker, PT [facility]   9/17/2020  1:45 PM Lorraine Whitaker, PT [facility]   9/22/2020  1:00 PM Shaji Norman, PT [facility]   9/24/2020  1:00 PM Shaji Norman, PT [facility]   9/29/2020  1:00 PM Shaji Norman, PT [facility]

## 2020-08-25 NOTE — THERAPY EVALUATION
Emma De Leon  : 1955  [place]  Secondary: Yehuda Miner at [address], [address] [place] [address],[address], [place] [address].  [phone]   [phone]           OUTPATIENT PHYSICAL THERAPY:Initial Assessment 2020   ICD-10: Treatment Diagnosis: Sacroiliitis, not elsewhere classified (M46.1); Pain in right hip (M25.551); Pain in left hip (M25.552)  Precautions/Allergies:   Latex, natural rubber; Celecoxib; and Mirtazapine   TREATMENT PLAN:  Effective Dates: 2020 TO 2020 (90 days). Frequency/Duration: 2 times a week for 90 Day(s) MEDICAL/REFERRING DIAGNOSIS:  Sacroiliitis, not elsewhere classified [M46.1]   DATE OF ONSET: Chronic LBP and bilateral hip pain  REFERRING PHYSICIAN: Jarret Catherine MD MD Orders: Evaluate and Treat  Return MD Appointment: Unknown at this time     INITIAL ASSESSMENT:  Ms. Kodak Joyce presents with decreased lumbar ROM, decreased bilateral hip strength and increased pain leading to decreased functional status. Pt would benefit from skilled physical therapy services to address the above deficits and help patient return to prior level of function. PROBLEM LIST (Impacting functional limitations):  1. Decreased Strength  2. Decreased ADL/Functional Activities  3. Increased Pain  4. Decreased Flexibility/Joint Mobility  5. Decreased [place] with Home Exercise Program INTERVENTIONS PLANNED: (Treatment may consist of any combination of the following)  1. Balance Exercise  2. Cold  3. Cryotherapy  4. Electrical Stimulation  5. Gait Training  6. Heat  7. Home Exercise Program (HEP)  8. Manual Therapy  9. Range of Motion (ROM)  10. Therapeutic Exercise/Strengthening  11. Ultrasound (US)  12. Aquatic Therapy     GOALS: (Goals have been discussed and agreed upon with patient.)  Short-Term Functional Goals: Time Frame: 4 weeks  1.  Pt will increase lumbar ROM flexion = 60 degrees, side bending = 25 degrees bilaterally to assist with household ADL's  2. Pt will increase strength bilateral hips 4+/5 to assist with household ADL's  3. Pt will be independent with HEP  Discharge Goals: Time Frame: 12 weeks  1. Pt will increase strength bilateral hips 5/5 to assist with household ADL's  2. Pt will increase SLR 60 degrees bilaterally to assist with household ADL's  3. Pt will perform 20 minutes household cleaning activities independently with min to no c/o LBP or hip pain    OUTCOME MEASURE:   Tool Used: Lower Extremity Functional Scale (LEFS)  Score:  Initial: 10/80 Most Recent: X/80 (Date: -- )   Interpretation of Score: 20 questions each scored on a 5 point scale with 0 representing \"extreme difficulty or unable to perform\" and 4 representing \"no difficulty\". The lower the score, the greater the functional disability. 80/80 represents no disability. Minimal detectable change is 9 points. MEDICAL NECESSITY:   · Patient is expected to demonstrate progress in strength, range of motion and functional technique to increase independence with household ADL's. · Patient demonstrates good rehab potential due to higher previous functional level. REASON FOR SERVICES/OTHER COMMENTS:  · Patient continues to require skilled intervention due to decreased lumbar ROM, decreased hip strength and increased pain leading to decreased functional status. Total Duration:  PT Patient Time In/Time Out  Time In: 1515  Time Out: 1600    Rehabilitation Potential For Stated Goals: Good  Regarding Bobbi Licona's therapy, I certify that the treatment plan above will be carried out by a therapist or under their direction.   Thank you for this referral,  Lizzie Herndon PT     Referring Physician Signature: Michael Mejia MD _______________________________ Date _____________     PAIN/SUBJECTIVE:   Initial:   8/10 Post Session:  8/10   HISTORY:   History of Injury/Illness (Reason for Referral):  Pt reports low back and bilateral hip pain of approximately 1 year duration. She states she fell down some stairs and broke her pelvis 7 years ago which she thinks may have led to her current symptoms. She reports having a cervical fusion in 2018 by Dr Florencia Deleon. She then had a lumbar fusion 2019 by Dr Iain Slater. She states the surgery helped for 6-7 months, but she then started having pain in her bilateral buttocks. She states MD told her he did everything he could for her and referred her to pain management. She has had 9 injections in her back with relief for a few days only. Pt had some physical therapy (approximately 3 sessions) including dry needling with worsening of her symptoms. She is seeing Dr Holley Pena for her R knee (she states MD told her that her knee is \"worn out). She is also seeing Dr Rafael Siegel for her R foot (bone spur), Dr Araceli Roy for her R shoulder (bursitis and tenditinitis), and Dr Christine Jovel for her R wrist/hand (chronic nerve damage and carpal tunnel syndrome). Pt rates her current low back and bilateral hip pain 8/10 sitting at rest.  Aggravating factors include lifting, standing after lying down, squatting and vacuuming/cleaning her house. She also reports difficulties getting in/out of the bathtub and changing her cat's litter box. She has had no recent diagnostic testing done of her back per patient. Pt also reports numbness/tingling into her R LE that comes and goes. Past Medical History/Comorbidities:   Ms. Vaughn Abdi  has a past medical history of Abnormal Papanicolaou smear of cervix, Anemia, Arthritis, Bowel trouble, Chronic pain, Depression, Ear problems, Elective , Fatigue (2012), GERD (gastroesophageal reflux disease), History of lumbar laminectomy (2019), Hypertension, Menopause, Miscarriage, Neck pain (2012), Pain in breast, Pelvis fracture, right ([address] Psychiatric disorder, Reflux esophagitis (2012), and Sinus congestion (2014).   Ms. Vaughn Abdi  has a past surgical history that includes us guided core breast biopsy (2016); hx colonoscopy (02/05/2009); hx endoscopy; hx orthopaedic (Left, 1998); hx cataract removal (Bilateral, 2017); neurological procedure unlisted (10/17/2018); and hx back surgery (01/2019). Social History/Living Environment:     Pt lives in a two-story house. She reports difficulties ascending/descending stairs due to her LBP and bilateral hip pain. Prior Level of Function/Work/Activity:  Pt is retired  Dominant Side:         RIGHT  Other Clinical Tests:          Pt has had no recent diagnostic testing done of her back per patient  Personal Factors:          Sex:  female        Age:  77 y.o. Profession:  Pt is retired   Ambulatory/Rehab [address] [place] [address] Risk Assessment   Risk Factors:       No Risk Factors Identified Ability to Rise from Chair:       (1)  Pushes up, successful in one attempt   [address]:       No modifications necessary   Total: (5 or greater = High Risk): 1   ©2010 [facility] of [place] [address]. [facility] States Patent #[id number]. Federal Law prohibits the replication, distribution or use without written permission from [facility] of [address] [place] [address]   Current Medications:       Current Outpatient Medications:     pantoprazole (PROTONIX) 40 mg tablet, TAKE ONE TABLET BY MOUTH ONE TIME DAILY, Disp: 90 Tab, Rfl: 0    [START ON 8/28/2020] LORazepam (ATIVAN) 1 mg tablet, TAKE ONE TABLET BY MOUTH TWICE A DAY AS NEEDED FOR ANXIETY MAX DAILY AMOUNT: 2 MG, Disp: 60 Tab, Rfl: 0    pregabalin (LYRICA) 100 mg capsule, Take 100 mg by mouth three (3) times daily. , Disp: , Rfl:     zolpidem (AMBIEN) 10 mg tablet, TAKE ONE TABLET BY MOUTH AT BEDTIME, Disp: 30 Tab, Rfl: 3    medroxyPROGESTERone (PROVERA) 2.5 mg tablet, Take 1 Tab by mouth daily. , Disp: 30 Tab, Rfl: 5    medroxyPROGESTERone (PROVERA) 2.5 mg tablet, TAKE ONE TABLET BY MOUTH ONE TIME DAILY, Disp: 30 Tab, Rfl: 5    ondansetron (ZOFRAN ODT) 4 mg disintegrating tablet, Take 1 Tab by mouth every eight (8) hours as needed for Nausea or Vomiting., Disp: 15 Tab, Rfl: 0    estradiol (ESTRACE) 0.01 % (0.1 mg/gram) vaginal cream, Apply a fingertip amount externally prn, Disp: 42.5 g, Rfl: 5    amLODIPine (NORVASC) 5 mg tablet, TAKE ONE TABLET BY MOUTH ONE TIME DAILY, Disp: 90 Tab, Rfl: 3    RESTASIS 0.05 % dpet, , Disp: , Rfl:     Flaxseed Oil oil, by Does Not Apply route., Disp: , Rfl:     OTHER,NON-FORMULARY,, Hydro eye, Disp: , Rfl:     valsartan (DIOVAN) 160 mg tablet, TAKE ONE TABLET BY MOUTH ONE TIME DAILY, Disp: 90 Tab, Rfl: 3    conjugated estrogens (PREMARIN) 1.25 mg tablet, TAKE 1 TABLET BY MOUTH DAILY, Disp: 90 Tab, Rfl: 3    buPROPion XL (WELLBUTRIN XL) 300 mg XL tablet, TAKE ONE TABLET BY MOUTH ONE TIME DAILY, Disp: 90 Tab, Rfl: 3    meloxicam (MOBIC) 7.5 mg tablet, Take 1 Tab by mouth daily. , Disp: 90 Tab, Rfl: 3    busPIRone (BUSPAR) 15 mg tablet, Take 1 Tab by mouth two (2) times a day., Disp: 60 Tab, Rfl: 5    cholecalciferol (VITAMIN D3) 1,000 unit tablet, Take 5,000 Units by mouth daily. , Disp: , Rfl:     vitamin e (E GEMS) 1,000 unit capsule, Take 1,000 Units by mouth daily. , Disp: , Rfl:    Date Last Reviewed:  08-24-20   Number of Personal Factors/Comorbidities that affect the Plan of Care: 1-2: MODERATE COMPLEXITY   EXAMINATION:   Observation/Orthostatic Postural Assessment:           Forward head, rounded shoulders  Palpation:          Tenderness bilateral lumbar paraspinals, piriformis, gluteals and SI joints  ROM:    Lumbar Flexion = 50 degrees  Lumbar Extension = 10 degrees (feels \"tight\")  Lumbar Side Bending R = 15 degrees (increased pain R low back)  Lumbar Side Bending L = 15 degrees (increased pain L low back)    Strength:    R hip flexion = 4/5  R hip abduction = 4/5  R hip adduction = 5/5  R knee extension = 5/5  R knee flexion = 5/5  R ankle dorsiflexion = 5/5  R ankle plantarflexion = 5/5    L hip flexion = 4/5  L hip abduction = 4/5  L hip adduction = 5/5  L knee extension = 5/5  L knee flexion = 5/5  L ankle dorsiflexion = 5/5  L ankle plantarflexion = 5/5    Special Tests:          SLR 45 degrees with hamstring tightness noted bilaterally; No leg length discrepancy noted    Neurological Screen:        Myotomes:  Minimal weakness bilateral hips        Dermatomes:  Sensation intact to light touch bilateral LE's        Reflexes:  DTR's 2+ to bilateral patellar and achilles  Functional Mobility:         Gait/Ambulation:  No significant deficits noted        Transfers:  Pt able to transition sit to supine and supine to sit independently    Body Structures Involved:  1. Nerves  2. Bones  3. Joints  4. Muscles Body Functions Affected:  1. Sensory/Pain  2. Neuromusculoskeletal Activities and Participation Affected:  1. Mobility  2.  Domestic Life   Number of elements (examined above) that affect the Plan of Care: 3: MODERATE COMPLEXITY   CLINICAL PRESENTATION:   Presentation: Evolving clinical presentation with changing clinical characteristics: MODERATE COMPLEXITY   CLINICAL DECISION MAKING:   Use of outcome tool(s) and clinical judgement create a POC that gives a: Questionable prediction of patient's progress: MODERATE COMPLEXITY

## 2020-09-02 ENCOUNTER — HOSPITAL ENCOUNTER (OUTPATIENT)
Dept: PHYSICAL THERAPY | Age: 65
Discharge: HOME | End: 2020-09-02
Payer: MEDICARE

## 2020-09-02 PROCEDURE — 97110 THERAPEUTIC EXERCISES: CPT

## 2020-09-02 NOTE — PROGRESS NOTES
Rosette Easton  : 1955  [place]  Secondary: Yehuda Miner at [place] [id number] [facility], [address] [place] [address],[address], [place] [address].  [phone]   [phone]         OUTPATIENT PHYSICAL THERAPY: Daily Treatment Note 2020  Visit Count:  2    ICD-10: Treatment Diagnosis: Sacroiliitis, not elsewhere classified (M46.1); Precautions/Allergies:   Latex, natural rubber; Celecoxib; and Mirtazapine   TREATMENT PLAN:  Effective Dates: 2020 TO 2020 (90 days). Frequency/Duration: 2 times a week for 90 Day(s)    Pre-treatment Symptoms/Complaints:  LBP, Bilateral Hip Pain; Pt states she is sore today. Pain: Initial:   8/10 Post Session:  8/10   Medications Last Reviewed:  2020  Updated Objective Findings:  None Today  TREATMENT:     THERAPEUTIC EXERCISE: (40 minutes):  Exercises per grid below to improve mobility and strength. Required minimal verbal cues to promote proper body alignment and promote proper body posture. Progressed resistance and repetitions as indicated. MODALITIES: (10 minutes):      Cold pack to low back in supine x10 minutes to decrease soreness. Skin clear afterwards. Date:  20 Date:  20 Date:     Activity/Exercise Parameters Parameters Parameters   SKTC 3 reps  20 sec holds  (HEP) 3 reps  20 sec holds  With towel for assist    Active Hamstring Stretch 3 reps  20 sec holds  (HEP) 3 reps  20 sec holds  With towel for assist    Pelvic Tilts 10 reps  5 sec holds  (HEP) 15 reps  5 sec holds    NuStep  Level 2  6 minutes    Supine Lumbar Rotation Stretch  10 reps  5 sec holds    Supine Marching  20 reps  B LE's    T-band Hip Abduction  LATEX-FREE   Red T-band  20 reps    SLR Flexion with TA  10 reps  5 sec holds  B LE's        MedBridge Portal  Treatment/Session Summary:    · Response to Treatment:  Pt tolerated all treatments well today with no c/o.   Added T-band hip abd and Supine Marching to [facility]. · Communication/Consultation:  None today  · Equipment provided today:  None today  · Recommendations/Intent for next treatment session: Next visit will focus on progression of stretching/strengthening as tolerable, manual therapy.     Total Treatment Billable Duration:  40 minutes  PT Patient Time In/Time Out  Time In: 1300  Time Out: [place], PT    Future Appointments   Date Time Provider Carolann Molina   9/4/2020  7:00 PM Mason Hedrick PT [facility] SFE   9/11/2020 10:30 AM Rupinder Ortega MD [facility] BS[facility]   9/15/2020  1:00 PM Garrett Brooks PT [facility]   9/17/2020  1:45 PM Garrett Brooks PT [facility]   9/22/2020  1:00 PM Mason Hedrick PT [facility]   9/24/2020  1:00 PM Mason Hedrick, PT [facility]

## 2020-09-04 ENCOUNTER — HOSPITAL ENCOUNTER (OUTPATIENT)
Dept: PHYSICAL THERAPY | Age: 65
Discharge: HOME | End: 2020-09-04
Payer: MEDICARE

## 2020-09-09 ENCOUNTER — APPOINTMENT (OUTPATIENT)
Dept: PHYSICAL THERAPY | Age: 65
End: 2020-09-09
Payer: MEDICARE

## 2020-09-11 ENCOUNTER — APPOINTMENT (OUTPATIENT)
Dept: PHYSICAL THERAPY | Age: 65
End: 2020-09-11
Payer: MEDICARE

## 2020-09-15 ENCOUNTER — HOSPITAL ENCOUNTER (OUTPATIENT)
Dept: PHYSICAL THERAPY | Age: 65
Discharge: HOME | End: 2020-09-15
Payer: MEDICARE

## 2020-09-15 PROCEDURE — 97140 MANUAL THERAPY 1/> REGIONS: CPT

## 2020-09-15 PROCEDURE — 97110 THERAPEUTIC EXERCISES: CPT

## 2020-09-15 NOTE — PROGRESS NOTES
Shahana Espinoza  : 1955  [place]  Secondary: Yehuda Pandya [address] at [address], [address] [facility], [place] [address].  [phone]   [phone]         OUTPATIENT PHYSICAL THERAPY: Daily Treatment Note 9/15/2020  Visit Count:  3    ICD-10: Treatment Diagnosis: Sacroiliitis, not elsewhere classified (M46.1); Precautions/Allergies:   Latex, natural rubber; Celecoxib; and Mirtazapine; **Pt t IS ALLERGIC TO LATEX**   TREATMENT PLAN:  Effective Dates: 2020 TO 2020 (90 days). Frequency/Duration: 2 times a week for 90 Day(s)    Pre-treatment Symptoms/Complaints:  LBP, Bilateral Hip Pain; Pt states the theraband hip abduction exercise aggravated her pelvis. She states she received an injection in her shoulder by Dr Vladimir Badillo last week which helped some. She is having an injection in her knee tomorrow. Pain: Initial:   6-10 Post Session:  6-7/10   Medications Last Reviewed:  9/15/2020  Updated Objective Findings:  None Today  TREATMENT:     THERAPEUTIC EXERCISE: (35 minutes):  Exercises per grid below to improve mobility and strength. Required minimal verbal cues to promote proper body alignment and promote proper body posture. Progressed resistance and repetitions as indicated. MANUAL THERAPY: (10 minutes): Soft tissue mob to bilateral lumbar paraspinals and upper gluteals x10 minutes was utilized and necessary because of the patient's restricted motion of soft tissue.       Date:  20 Date:  20 Date:  09-15-20   Activity/Exercise Parameters Parameters Parameters   SKTC 3 reps  20 sec holds  (HEP) 3 reps  20 sec holds  With towel for assist 3 reps  20 sec holds  With towel for assist   Active Hamstring Stretch 3 reps  20 sec holds  (HEP) 3 reps  20 sec holds  With towel for assist 3 reps  20 sec holds  With towel for assist   Pelvic Tilts 10 reps  5 sec holds  (HEP) 15 reps  5 sec holds 15 reps  5 sec holds   NuStep  Level 2  6 minutes Level 3  6 minutes   Supine Lumbar Rotation Stretch  10 reps  5 sec holds 10 reps  5 sec holds   Supine Marching  20 reps  B LE's 20 reps  B LE's   T-band Hip Abduction  LATEX-FREE   Red T-band  20 reps HELD   SLR Flexion with TA  10 reps  5 sec holds  B LE's 10 reps  5 sec holds  B LE's    T-band Rows and Straight Arm Pulldowns   Red T-band  15 reps each       MedBridge Portal  Treatment/Session Summary:    · Response to Treatment:  Pt tolerated all treatments well today with no c/o. Pt had decreased pain following manual therapy. · Communication/Consultation:  None today  · Equipment provided today:  None today  · Recommendations/Intent for next treatment session: Next visit will focus on progression of stretching/strengthening as tolerable, manual therapy.     Total Treatment Billable Duration:  45 minutes  PT Patient Time In/Time Out  Time In: 1300  Time Out: Cory Bergeron PT    Future Appointments   Date Time Provider Carolann Molnia   9/17/2020  1:45 PM [facility]   9/22/2020  1:00 PM Nafisa Oneill, PT [facility]   9/24/2020  1:00 PM Nafisa Oneill PT [facility]   11/17/2020  2:30 PM Srikanth Wilhelm MD [facility]

## 2020-09-17 ENCOUNTER — HOSPITAL ENCOUNTER (OUTPATIENT)
Dept: PHYSICAL THERAPY | Age: 65
Discharge: HOME | End: 2020-09-17
Payer: MEDICARE

## 2020-09-17 NOTE — PROGRESS NOTES
Pt cancelled her physical therapy appt for this afternoon due to inclement weather.     Kade Carrillo PT [phone]

## 2020-09-22 ENCOUNTER — HOSPITAL ENCOUNTER (OUTPATIENT)
Dept: PHYSICAL THERAPY | Age: 65
Discharge: HOME | End: 2020-09-22
Payer: MEDICARE

## 2020-09-22 PROCEDURE — 97110 THERAPEUTIC EXERCISES: CPT

## 2020-09-22 PROCEDURE — 97140 MANUAL THERAPY 1/> REGIONS: CPT

## 2020-09-22 NOTE — PROGRESS NOTES
Randy Bagley  : 1955  [place]  Secondary: Yehuda Miner at [place] [id number] [facility], [address], [place] [address].  [phone]   [phone]         OUTPATIENT PHYSICAL THERAPY: Daily Treatment Note 2020  Visit Count:  4    ICD-10: Treatment Diagnosis: Sacroiliitis, not elsewhere classified (M46.1); Precautions/Allergies:   Latex, natural rubber; Celecoxib; and Mirtazapine; **Pt t IS ALLERGIC TO LATEX**   TREATMENT PLAN:  Effective Dates: 2020 TO 2020 (90 days). Frequency/Duration: 2 times a week for 90 Day(s)    Pre-treatment Symptoms/Complaints:  LBP, Bilateral Hip Pain; 2020: Patient reports she is doing pretty good today. Pain: Initial:   6-10 Post Session:  6-7/10   Medications Last Reviewed:  2020  Updated Objective Findings:  None Today  TREATMENT:     THERAPEUTIC EXERCISE: (35 minutes):  Exercises per grid below to improve mobility and strength. Required minimal verbal cues to promote proper body alignment and promote proper body posture. Progressed resistance and repetitions as indicated.    Date:  20 Date:  20 Date:  09-15-20 Date:  2020   Activity/Exercise Parameters Parameters Parameters Parameters   SKTC 3 reps  20 sec holds  (HEP) 3 reps  20 sec holds  With towel for assist 3 reps  20 sec holds  With towel for assist 3 reps  20 second hold with towel for assist   Active Hamstring Stretch 3 reps  20 sec holds  (HEP) 3 reps  20 sec holds  With towel for assist 3 reps  20 sec holds  With towel for assist 3 reps  20 second hold with towel for assist   Pelvic Tilts 10 reps  5 sec holds  (HEP) 15 reps  5 sec holds 15 reps  5 sec holds 15 reps  5 second hold   NuStep  Level 2  6 minutes Level 3  6 minutes 6 minutes  Level 3   Supine Lumbar Rotation Stretch  10 reps  5 sec holds 10 reps  5 sec holds 10 reps  5 second hold   Supine Marching  20 reps  B LE's 20 reps  B LE's 20 reps  B LE   T-band Hip Abduction  LATEX-FREE   Red T-band  20 reps HELD    SLR Flexion with TA  10 reps  5 sec holds  B LE's 10 reps  5 sec holds  B LE's  10 reps  5 second hold  B LE   T-band Rows and Straight Arm Pulldowns   Red T-band  15 reps each Red t-band  15 reps each     MANUAL THERAPY: (10 minutes): Soft tissue mobilization to bilateral lumbar parspinal and upper trapezius region was utilized and necessary because of the patient's restricted motion of soft tissue. MedBridge Portal  Treatment/Session Summary:    · Response to Treatment:  Patient tolerated treatment well with improving mobility and pain noted after treatment. · Communication/Consultation:  None today  · Equipment provided today:  None today  · Recommendations/Intent for next treatment session: Next visit will focus on progression of stretching/strengthening as tolerable, manual therapy.     Total Treatment Billable Duration:  45 minutes  PT Patient Time In/Time Out  Time In: 1300  Time Out: [address], PT    Future Appointments   Date Time Provider Carolann Molina   9/22/2020  1:00 PM Graciela Montelongo [facility] SFE   9/24/2020  1:00 PM Shaji Norman, [place] [facility] SFE   11/17/2020  2:30 PM Alcira Dan MD [facility] BSNE

## 2020-09-24 ENCOUNTER — HOSPITAL ENCOUNTER (OUTPATIENT)
Dept: PHYSICAL THERAPY | Age: 65
Discharge: HOME | End: 2020-09-24
Payer: MEDICARE

## 2020-09-24 PROCEDURE — 97110 THERAPEUTIC EXERCISES: CPT

## 2020-09-24 PROCEDURE — 97140 MANUAL THERAPY 1/> REGIONS: CPT

## 2020-09-24 NOTE — PROGRESS NOTES
Sharon Holly  : 1955  [place]  Secondary: Yehuda Miner at [address], [address] [place] [address],[address], [place] [address].  [phone]   [phone]         OUTPATIENT PHYSICAL THERAPY: Daily Treatment Note 2020  Visit Count:  5    ICD-10: Treatment Diagnosis: Sacroiliitis, not elsewhere classified (M46.1); Precautions/Allergies:   Latex, natural rubber; Celecoxib; and Mirtazapine; **Pt t IS ALLERGIC TO LATEX**   TREATMENT PLAN:  Effective Dates: 2020 TO 2020 (90 days). Frequency/Duration: 2 times a week for 90 Day(s)    Pre-treatment Symptoms/Complaints:  LBP, Bilateral Hip Pain; 2020: Patient reports she is doing pretty good today. Pain: Initial:   6-7/10 Post Session:  6-7/10   Medications Last Reviewed:  2020  Updated Objective Findings:  None Today  TREATMENT:     THERAPEUTIC EXERCISE: (35 minutes):  Exercises per grid below to improve mobility and strength. Required minimal verbal cues to promote proper body alignment and promote proper body posture. Progressed resistance and repetitions as indicated.    Date:  09-15-20 Date:  2020 Date:  2020   Activity/Exercise Parameters Parameters Parameters   SKTC 3 reps  20 sec holds  With towel for assist 3 reps  20 second hold with towel for assist 3 reps  20 second hold with towel for assist   Active Hamstring Stretch 3 reps  20 sec holds  With towel for assist 3 reps  20 second hold with towel for assist 3 reps  20 second hold with towel for assist   Pelvic Tilts 15 reps  5 sec holds 15 reps  5 second hold 15 reps  5 second hold   NuStep Level 3  6 minutes 6 minutes  Level 3 6 minutes  Level 3   Supine Lumbar Rotation Stretch 10 reps  5 sec holds 10 reps  5 second hold 10 reps  5 second hold   Supine Marching 20 reps  B LE's 20 reps  B LE 20 reps  B LE   T-band Hip Abduction HELD     SLR Flexion with TA 10 reps  5 sec holds  B LE's 10 reps  5 second hold  B LE 10 reps  5 second hold  B LE   T-band Rows and Straight Arm Pulldowns Red T-band  15 reps each Red t-band  15 reps each Red t-band  15 reps each     MANUAL THERAPY: (10 minutes): Soft tissue mobilization to bilateral lumbar parspinal and upper trapezius region was utilized and necessary because of the patient's restricted motion of soft tissue. Treatment/Session Summary:    · Response to Treatment:  Patient tolerated treatment well with improved mobility after treatment. · Communication/Consultation:  None today  · Equipment provided today:  None today  · Recommendations/Intent for next treatment session: Next visit will focus on progression of stretching/strengthening as tolerable, manual therapy.     Total Treatment Billable Duration:  45 minutes  PT Patient Time In/Time Out  Time In: 1300  Time Out: 75 Sharlene Sandoval PT    Future Appointments   Date Time Provider Carolann Molina   9/24/2020  1:00 PM Patricia Reina [facility] SFE   11/17/2020  2:30 PM Rosalva Zuniga MD [facility] BSNE

## 2020-09-29 ENCOUNTER — APPOINTMENT (OUTPATIENT)
Dept: PHYSICAL THERAPY | Age: 65
End: 2020-09-29
Payer: MEDICARE

## 2020-11-17 PROBLEM — R29.898 WEAKNESS OF BOTH LEGS: Status: ACTIVE | Noted: 2020-11-17

## 2020-11-17 PROBLEM — R29.3 POSTURAL IMBALANCE: Status: ACTIVE | Noted: 2020-11-17

## 2020-11-17 PROBLEM — R20.0 NUMBNESS AND TINGLING OF BOTH FEET: Status: ACTIVE | Noted: 2020-11-17

## 2020-11-17 PROBLEM — G62.9 NEUROPATHY: Status: ACTIVE | Noted: 2020-11-17

## 2020-11-17 PROBLEM — R20.2 NUMBNESS AND TINGLING OF BOTH FEET: Status: ACTIVE | Noted: 2020-11-17

## 2021-01-04 NOTE — THERAPY DISCHARGE
Shahana Espinoza  : 1955  [place]  Secondary: Yehuda Miner at [address], [address] [place] [address],[address] [facility], [place] [address].  [phone]   [phone]           OUTPATIENT PHYSICAL THERAPY:Discontinuation Summary 2021   ICD-10: Treatment Diagnosis: Sacroiliitis, not elsewhere classified (M46.1); Pain in right hip (M25.551); Pain in left hip (M25.552)  Precautions/Allergies:   Latex, natural rubber; Celecoxib; and Mirtazapine   TREATMENT PLAN:  Effective Dates: 2020 TO 2020 (90 days). Frequency/Duration: 2 times a week for 90 Day(s) MEDICAL/REFERRING DIAGNOSIS:  Sacroiliitis, not elsewhere classified [M46.1]   DATE OF ONSET: Chronic LBP and bilateral hip pain  REFERRING PHYSICIAN: Claudine Donald MD MD Orders: Evaluate and Treat  Return MD Appointment: Unknown at this time     INITIAL ASSESSMENT:  Ms. Deandre Salas was last seen for PT on 20. She was doing well on that visit and did not attend any further PT after that date. Discharge from PT to independent HEP. PROBLEM LIST (Impacting functional limitations):  1. Decreased Strength  2. Decreased ADL/Functional Activities  3. Increased Pain  4. Decreased Flexibility/Joint Mobility  5. Decreased [place] with Home Exercise Program INTERVENTIONS PLANNED: (Treatment may consist of any combination of the following)  1. Balance Exercise  2. Cold  3. Cryotherapy  4. Electrical Stimulation  5. Gait Training  6. Heat  7. Home Exercise Program (HEP)  8. Manual Therapy  9. Range of Motion (ROM)  10. Therapeutic Exercise/Strengthening  11. Ultrasound (US)  12. Aquatic Therapy     GOALS: (Goals have been discussed and agreed upon with patient.)  Short-Term Functional Goals: Time Frame: 4 weeks  1. Pt will increase lumbar ROM flexion = 60 degrees, side bending = 25 degrees bilaterally to assist with household ADL's  2.  Pt will increase strength bilateral hips 4+/5 to assist with household ADL's  3. Pt will be independent with HEP  Discharge Goals: Time Frame: 12 weeks  1. Pt will increase strength bilateral hips 5/5 to assist with household ADL's  2. Pt will increase SLR 60 degrees bilaterally to assist with household ADL's  3. Pt will perform 20 minutes household cleaning activities independently with min to no c/o LBP or hip pain    OUTCOME MEASURE:   Tool Used: Lower Extremity Functional Scale (LEFS)  Score:  Initial: 10/80 Most Recent: X/80 (Date: -- )   Interpretation of Score: 20 questions each scored on a 5 point scale with 0 representing \"extreme difficulty or unable to perform\" and 4 representing \"no difficulty\". The lower the score, the greater the functional disability. 80/80 represents no disability. Minimal detectable change is 9 points. MEDICAL NECESSITY:   · Patient is expected to demonstrate progress in strength, range of motion and functional technique to increase independence with household ADL's. · Patient demonstrates good rehab potential due to higher previous functional level. REASON FOR SERVICES/OTHER COMMENTS:  · Patient continues to require skilled intervention due to decreased lumbar ROM, decreased hip strength and increased pain leading to decreased functional status. Total Duration:  PT Patient Time In/Time Out  Time In: 1300  Time Out: 1345    Rehabilitation Potential For Stated Goals: Good  Regarding Agueda Licona's therapy, I certify that the treatment plan above will be carried out by a therapist or under their direction. Thank you for this referral,  Karon Cristina, PT     Referring Physician Signature: Ilda Blue MD _______________________________ Date _____________     PAIN/SUBJECTIVE:   Initial:   8/10 Post Session:  8/10   HISTORY:   History of Injury/Illness (Reason for Referral):  Pt reports low back and bilateral hip pain of approximately 1 year duration.   She states she fell down some stairs and broke her pelvis 7 years ago which she thinks may have led to her current symptoms. She reports having a cervical fusion in 2018 by Dr Evita Forbes. She then had a lumbar fusion 2019 by Dr Maribel Benites. She states the surgery helped for 6-7 months, but she then started having pain in her bilateral buttocks. She states MD told her he did everything he could for her and referred her to pain management. She has had 9 injections in her back with relief for a few days only. Pt had some physical therapy (approximately 3 sessions) including dry needling with worsening of her symptoms. She is seeing Dr Ramo Rivas for her R knee (she states MD told her that her knee is \"worn out). She is also seeing Dr Ruben Stewart for her R foot (bone spur), Dr Mateo Gamble for her R shoulder (bursitis and tenditinitis), and Dr Fermin Hathaway for her R wrist/hand (chronic nerve damage and carpal tunnel syndrome). Pt rates her current low back and bilateral hip pain 8/10 sitting at rest.  Aggravating factors include lifting, standing after lying down, squatting and vacuuming/cleaning her house. She also reports difficulties getting in/out of the bathtub and changing her cat's litter box. She has had no recent diagnostic testing done of her back per patient. Pt also reports numbness/tingling into her R LE that comes and goes. Past Medical History/Comorbidities:   Ms. Inga Ly  has a past medical history of Abnormal Papanicolaou smear of cervix, Anemia, Arthritis, Bowel trouble, Chronic pain, Depression, Ear problems, Elective , Fatigue (2012), GERD (gastroesophageal reflux disease), History of lumbar laminectomy (2019), Hypertension, Menopause, Miscarriage, Neck pain (2012), Pain in breast, Pelvis fracture, right ([address] Psychiatric disorder, Reflux esophagitis (2012), and Sinus congestion (2014).   Ms. Inga Ly  has a past surgical history that includes us guided core breast biopsy (); hx colonoscopy (2009); hx endoscopy; hx orthopaedic (Left, 1998); hx cataract removal (Bilateral, 2017); neurological procedure unlisted (10/17/2018); and hx back surgery (01/2019). Social History/Living Environment:     Pt lives in a two-story house. She reports difficulties ascending/descending stairs due to her LBP and bilateral hip pain. Prior Level of Function/Work/Activity:  Pt is retired  Dominant Side:         RIGHT  Other Clinical Tests:          Pt has had no recent diagnostic testing done of her back per patient  Personal Factors:          Sex:  female        Age:  77 y.o. Profession:  Pt is retired   Ambulatory/Rehab [address] [place] [address] Risk Assessment   Risk Factors:       No Risk Factors Identified Ability to Rise from Chair:       (1)  Pushes up, successful in one attempt   [address]:       No modifications necessary   Total: (5 or greater = High Risk): 1   ©2010 [facility]. [facility] Patent #[id number]. Federal Law prohibits the replication, distribution or use without written permission from [facility]   Current Medications:       Current Outpatient Medications:     triamcinolone acetonide (KENALOG) 0.1 % dental paste, by Dental route two (2) times a day., Disp: 5 g, Rfl: 0    LORazepam (ATIVAN) 1 mg tablet, TAKE ONE TABLET BY MOUTH TWICE A DAY AS NEEDED FOR ANXIETY, Disp: 60 Tab, Rfl: 2    predniSONE (DELTASONE) 20 mg tablet, Take 20 mg by mouth daily (with breakfast). , Disp: 5 Tab, Rfl: 0    pantoprazole (PROTONIX) 40 mg tablet, TAKE ONE TABLET BY MOUTH ONE TIME DAILY, Disp: 90 Tab, Rfl: 0    pregabalin (LYRICA) 100 mg capsule, Take 100 mg by mouth three (3) times daily. , Disp: , Rfl:     zolpidem (AMBIEN) 10 mg tablet, TAKE ONE TABLET BY MOUTH AT BEDTIME, Disp: 30 Tab, Rfl: 3    medroxyPROGESTERone (PROVERA) 2.5 mg tablet, Take 1 Tab by mouth daily. , Disp: 30 Tab, Rfl: 5    ondansetron (ZOFRAN ODT) 4 mg disintegrating tablet, Take 1 Tab by mouth every eight (8) hours as needed for Nausea or Vomiting., Disp: 15 Tab, Rfl: 0    estradiol (ESTRACE) 0.01 % (0.1 mg/gram) vaginal cream, Apply a fingertip amount externally prn, Disp: 42.5 g, Rfl: 5    amLODIPine (NORVASC) 5 mg tablet, TAKE ONE TABLET BY MOUTH ONE TIME DAILY, Disp: 90 Tab, Rfl: 3    RESTASIS 0.05 % dpet, , Disp: , Rfl:     Flaxseed Oil oil, by Does Not Apply route., Disp: , Rfl:     OTHER,NON-FORMULARY,, Hydro eye, Disp: , Rfl:     valsartan (DIOVAN) 160 mg tablet, TAKE ONE TABLET BY MOUTH ONE TIME DAILY, Disp: 90 Tab, Rfl: 3    conjugated estrogens (PREMARIN) 1.25 mg tablet, TAKE 1 TABLET BY MOUTH DAILY, Disp: 90 Tab, Rfl: 3    buPROPion XL (WELLBUTRIN XL) 300 mg XL tablet, TAKE ONE TABLET BY MOUTH ONE TIME DAILY, Disp: 90 Tab, Rfl: 3    meloxicam (MOBIC) 7.5 mg tablet, Take 1 Tab by mouth daily. , Disp: 90 Tab, Rfl: 3    cholecalciferol (VITAMIN D3) 1,000 unit tablet, Take 5,000 Units by mouth daily. , Disp: , Rfl:     vitamin e (E GEMS) 1,000 unit capsule, Take 1,000 Units by mouth daily. , Disp: , Rfl:    Date Last Reviewed:  08-24-20   Number of Personal Factors/Comorbidities that affect the Plan of Care: 1-2: MODERATE COMPLEXITY   EXAMINATION:   Observation/Orthostatic Postural Assessment:           Forward head, rounded shoulders  Palpation:          Tenderness bilateral lumbar paraspinals, piriformis, gluteals and SI joints  ROM:    Lumbar Flexion = 50 degrees  Lumbar Extension = 10 degrees (feels \"tight\")  Lumbar Side Bending R = 15 degrees (increased pain R low back)  Lumbar Side Bending L = 15 degrees (increased pain L low back)    Strength:    R hip flexion = 4/5  R hip abduction = 4/5  R hip adduction = 5/5  R knee extension = 5/5  R knee flexion = 5/5  R ankle dorsiflexion = 5/5  R ankle plantarflexion = 5/5    L hip flexion = 4/5  L hip abduction = 4/5  L hip adduction = 5/5  L knee extension = 5/5  L knee flexion = 5/5  L ankle dorsiflexion = 5/5  L ankle plantarflexion = 5/5    Special Tests:          SLR 45 degrees with hamstring tightness noted bilaterally; No leg length discrepancy noted    Neurological Screen:        Myotomes:  Minimal weakness bilateral hips        Dermatomes:  Sensation intact to light touch bilateral LE's        Reflexes:  DTR's 2+ to bilateral patellar and achilles  Functional Mobility:         Gait/Ambulation:  No significant deficits noted        Transfers:  Pt able to transition sit to supine and supine to sit independently    Body Structures Involved:  1. Nerves  2. Bones  3. Joints  4. Muscles Body Functions Affected:  1. Sensory/Pain  2. Neuromusculoskeletal Activities and Participation Affected:  1. Mobility  2.  Domestic Life   Number of elements (examined above) that affect the Plan of Care: 3: MODERATE COMPLEXITY   CLINICAL PRESENTATION:   Presentation: Evolving clinical presentation with changing clinical characteristics: MODERATE COMPLEXITY   CLINICAL DECISION MAKING:   Use of outcome tool(s) and clinical judgement create a POC that gives a: Questionable prediction of patient's progress: MODERATE COMPLEXITY

## 2021-01-08 NOTE — PROGRESS NOTES
Problem: Falls - Risk of 
Goal: *Absence of Falls Document Mathieu Dust Fall Risk and appropriate interventions in the flowsheet. Outcome: Progressing Towards Goal 
Fall Risk Interventions: 
Mobility Interventions: Bed/chair exit alarm, Patient to call before getting OOB, Communicate number of staff needed for ambulation/transfer Medication Interventions: Bed/chair exit alarm, Patient to call before getting OOB Elimination Interventions: Bed/chair exit alarm, Call light in reach, Patient to call for help with toileting needs Niacinamide Pregnancy And Lactation Text: These medications are considered safe during pregnancy.

## 2021-03-30 ENCOUNTER — HOSPITAL ENCOUNTER (OUTPATIENT)
Dept: INFUSION THERAPY | Age: 66
Discharge: HOME | End: 2021-03-30
Payer: MEDICARE

## 2021-03-30 VITALS
SYSTOLIC BLOOD PRESSURE: 134 MMHG | RESPIRATION RATE: 18 BRPM | TEMPERATURE: 98.1 F | OXYGEN SATURATION: 100 % | HEART RATE: 85 BPM | DIASTOLIC BLOOD PRESSURE: 75 MMHG

## 2021-03-30 PROCEDURE — 74011250636 HC RX REV CODE- 250/636: Performed by: PHYSICIAN ASSISTANT

## 2021-03-30 PROCEDURE — 96365 THER/PROPH/DIAG IV INF INIT: CPT

## 2021-03-30 PROCEDURE — 74011250636 HC RX REV CODE- 250/636: Performed by: INTERNAL MEDICINE

## 2021-03-30 RX ORDER — DIPHENHYDRAMINE HYDROCHLORIDE 50 MG/ML
50 INJECTION, SOLUTION INTRAMUSCULAR; INTRAVENOUS AS NEEDED
Status: DISCONTINUED | OUTPATIENT
Start: 2021-03-30 | End: 2021-03-31 | Stop reason: HOSPADM

## 2021-03-30 RX ORDER — SODIUM CHLORIDE 9 MG/ML
25 INJECTION, SOLUTION INTRAVENOUS CONTINUOUS
Status: DISCONTINUED | OUTPATIENT
Start: 2021-03-30 | End: 2021-04-01 | Stop reason: HOSPADM

## 2021-03-30 RX ORDER — TOPIRAMATE 25 MG/1
25 TABLET ORAL
COMMUNITY
End: 2021-05-12

## 2021-03-30 RX ORDER — EPINEPHRINE 1 MG/ML
0.3 INJECTION, SOLUTION, CONCENTRATE INTRAVENOUS AS NEEDED
Status: DISCONTINUED | OUTPATIENT
Start: 2021-03-30 | End: 2021-03-31 | Stop reason: HOSPADM

## 2021-03-30 RX ORDER — HYDROCORTISONE SODIUM SUCCINATE 100 MG/2ML
100 INJECTION, POWDER, FOR SOLUTION INTRAMUSCULAR; INTRAVENOUS AS NEEDED
Status: DISCONTINUED | OUTPATIENT
Start: 2021-03-30 | End: 2021-03-31 | Stop reason: HOSPADM

## 2021-03-30 RX ORDER — ACETAMINOPHEN 325 MG/1
650 TABLET ORAL AS NEEDED
Status: DISCONTINUED | OUTPATIENT
Start: 2021-03-30 | End: 2021-03-31 | Stop reason: HOSPADM

## 2021-03-30 RX ORDER — ONDANSETRON 2 MG/ML
8 INJECTION INTRAMUSCULAR; INTRAVENOUS AS NEEDED
Status: DISCONTINUED | OUTPATIENT
Start: 2021-03-30 | End: 2021-03-31 | Stop reason: HOSPADM

## 2021-03-30 RX ORDER — ALBUTEROL SULFATE 0.83 MG/ML
2.5 SOLUTION RESPIRATORY (INHALATION) AS NEEDED
Status: DISCONTINUED | OUTPATIENT
Start: 2021-03-30 | End: 2021-03-31 | Stop reason: HOSPADM

## 2021-03-30 RX ADMIN — FERRIC CARBOXYMALTOSE INJECTION 750 MG: 50 INJECTION, SOLUTION INTRAVENOUS at 16:01

## 2021-03-30 RX ADMIN — SODIUM CHLORIDE 25 ML/HR: 900 INJECTION, SOLUTION INTRAVENOUS at 15:45

## 2021-03-30 NOTE — PROGRESS NOTES
Arrived to the [facility]. Assessment completed Injectafer completed. Patient tolerated without problems. Patient stayed for 30 min observation post infusion with no problems noted  Any issues or concerns during appointment: None  Instructed to call provider with any side effects or concerns  Patient aware of next infusion appointment on 4/6/21(date) at 3 30 PM (time).   Discharged ambulatory

## 2021-04-06 ENCOUNTER — HOSPITAL ENCOUNTER (OUTPATIENT)
Dept: INFUSION THERAPY | Age: 66
Discharge: HOME | End: 2021-04-06
Payer: MEDICARE

## 2021-04-06 VITALS
RESPIRATION RATE: 16 BRPM | OXYGEN SATURATION: 100 % | DIASTOLIC BLOOD PRESSURE: 68 MMHG | HEART RATE: 98 BPM | TEMPERATURE: 98.3 F | SYSTOLIC BLOOD PRESSURE: 135 MMHG

## 2021-04-06 PROCEDURE — 74011250636 HC RX REV CODE- 250/636: Performed by: PHYSICIAN ASSISTANT

## 2021-04-06 PROCEDURE — 96365 THER/PROPH/DIAG IV INF INIT: CPT

## 2021-04-06 RX ORDER — SODIUM CHLORIDE 9 MG/ML
500 INJECTION, SOLUTION INTRAVENOUS CONTINUOUS
Status: DISCONTINUED | OUTPATIENT
Start: 2021-04-06 | End: 2021-04-08 | Stop reason: HOSPADM

## 2021-04-06 RX ORDER — SODIUM CHLORIDE 0.9 % (FLUSH) 0.9 %
5 SYRINGE (ML) INJECTION AS NEEDED
Status: DISCONTINUED | OUTPATIENT
Start: 2021-04-06 | End: 2021-04-08 | Stop reason: HOSPADM

## 2021-04-06 RX ADMIN — SODIUM CHLORIDE 500 ML: 900 INJECTION, SOLUTION INTRAVENOUS at 15:50

## 2021-04-06 RX ADMIN — Medication 5 ML: at 15:20

## 2021-04-06 RX ADMIN — FERRIC CARBOXYMALTOSE INJECTION 750 MG: 50 INJECTION, SOLUTION INTRAVENOUS at 15:30

## 2021-04-06 NOTE — PROGRESS NOTES
[facility] Assessment completed and labs reviewed. Injectafer infusion completed. Patient tolerated well. Any issues or concerns during appointment: none. Patient has no further infusion appointments at this time. Discharged ambulatory.

## 2021-05-06 ENCOUNTER — HOSPITAL ENCOUNTER (OUTPATIENT)
Dept: LAB | Age: 66
Discharge: HOME | End: 2021-05-06

## 2021-05-06 PROCEDURE — 88312 SPECIAL STAINS GROUP 1: CPT

## 2021-05-06 PROCEDURE — 88305 TISSUE EXAM BY PATHOLOGIST: CPT

## 2021-07-01 ENCOUNTER — HOSPITAL ENCOUNTER (OUTPATIENT)
Dept: PHYSICAL THERAPY | Age: 66
End: 2021-07-01
Payer: MEDICARE

## 2021-07-19 ENCOUNTER — HOSPITAL ENCOUNTER (OUTPATIENT)
Dept: PHYSICAL THERAPY | Age: 66
Discharge: HOME | End: 2021-07-19
Payer: MEDICARE

## 2021-07-19 PROCEDURE — 97140 MANUAL THERAPY 1/> REGIONS: CPT

## 2021-07-19 PROCEDURE — 97161 PT EVAL LOW COMPLEX 20 MIN: CPT

## 2021-07-19 NOTE — THERAPY EVALUATION
Nils Farooq  : 1955  [place]  Secondary: Yehuda Miner at . Derek Reinoso [address]  [address]. [address], [address]  [phone]   [phone]      OUTPATIENT PHYSICAL THERAPY:Initial Evaluation2021    ICD-10: Treatment Diagnosis:   Cervicalgia (M54.2)  Muscle Weakness (generalized) (M62.81)  Low back pain (M54.5)  Intervertebral disc disorders with radiculopathy, lumbar region (M51.16)  Abnormal posture (R29.3)  Pain in right hip (M25.551)  Stiffness of right hip, not elsewhere classified (M25.651)  Psoas tendinitis, right hip (M76.11)      Precautions/Allergies:   Latex, natural rubber; Celecoxib; and Mirtazapine     MD Orders: Eval and Treat MEDICAL/REFERRING DIAGNOSIS:  neck pain  lumbar radiculopathy  arthrodesis status   DATE OF ONSET: 1 year ago   REFERRING PHYSICIAN: Renaldo Pelayo MD  RETURN PHYSICIAN APPOINTMENT: TBD by patient      INITIAL ASSESSMENT:      Nils Farooq presents with functional limitations due to low back, hip, and neck pain . Physical Therapy evaluation reveals decreased strength core muscles and R hip, leg length discrepancy, point tenderness of right iliopsoas, poor postural habits, and decreased knowledge and independence of HEP . Nils Farooq will benefit from home exercise program, therapeutic and postural strengthening exercises, manual therapeutic techniques (ie. Distraction, SOR, myofascial release/soft tissue mobilization) as appropriate to address Selvin Erazo current condition. Nils Farooq will benefit from skilled PT (medically necessary) to address above deficits affecting participation in basic ADLs and overall functional tolerance. PROBLEM LIST (Impacting functional limitations):  1. Decreased Strength  2. Decreased ADL/Functional Activities  3. Decreased Transfer Abilities  4. Increased Pain  5. Decreased Activity Tolerance  6.  Increased Fatigue  7. Decreased Flexibility/Joint Mobility  8. Decreased Otho with Home Exercise Program INTERVENTIONS PLANNED:  1. Balance Exercise  2. Bed Mobility  3. Cold  4. Gait Training  5. Heat  6. Home Exercise Program (HEP)  7. Manual Therapy  8. Neuromuscular Re-education/Strengthening  9. Range of Motion (ROM)  10. Therapeutic Activites  11. Therapeutic Exercise/Strengthening  12. Transfer Training   TREATMENT PLAN:  Effective Dates: 7/19/2021 TO 9/17/2021 (60 days). Frequency/Duration: 2 times a week for 60 Days  GOALS: (Goals have been discussed and agreed upon with patient.)     Short-Term Goals~4 weeks  Goal Met   1. Kalpana Higgins will report <=3/10 pain of spine and hips while at rest.  1.  [] Date:   2. Kalpana Higgins will demonstrate improved Oswestry LBP questionnaire score by 8 points to show improved functional mobility. 2.  [] Date:   3. Kalpana Higgins to be independent with initial HEP for core strength and functional ROM. 3.  [] Date:   5. Kalpana Higgins will improve MMT to >=4+/5 to all LE strengthening to return to PLOF and improve functional tolerance. 5.  [] Date:    Kalpana Higgins  6. [] Date:         Long Term Goals~8 weeks Goal Met   1. Kalpana Higgins will report low back and hip pain is no longer constant. 1.  [] Date:   2. Kalpana Higgins will demonstrate improved Oswestry score by 15 points to show increased overall functional mobility. 2.  [] Date:   3. Kalpana Higgins to be independent with advanced HEP for core strengthening and functional ROM. 3.  [] Date:   4. Kalpana Higgins will be able to ambulate 1 flight of stairs safely, with mild use of one UE or less for balance. [] Date:    Kalpana Higgins will be able to walk or stand >30 min without limitation from low back and hip pain.    [] Date:    Kalpana Higgins will sit to stand without UE at least 12 times in 30 sec    [] Date:     Outcome Measure: Tool Used:  Tool Used: Modified Oswestry Low Back Pain Questionnaire  Score:  Initial: 17/50  Most Recent: X/50 (Date: -- )     Medical Necessity:   · Skilled intervention continues to be required due to address above deficits affecting participation in basic ADLs and overall functional tolerance. Reason for Services/Other Comments:  · Patient continues to require skilled intervention due to address above deficits affecting participation in basic ADLs and overall functional tolerance. Total Treatment Duration:  PT Patient Time In/Time Out  Time In: 1355  Time Out: 1455      Rehabilitation Potential For Stated Goals: GOOD  Regarding Komal Licona's therapy, I certify that the treatment plan above will be carried out by a therapist or under their direction. Thank you for this referral,  Corina Li, PT     Referring Physician Signature: Kamari oCol MD              Date                    HISTORY:   History of Present Injury/Illness (Reason for Referral:    Chronic spine pain for several years; cervical fusion 2018, lumbar fusion 2019. Lumbar and groin pain returned about 1 year ago. Injections to treat but unsuccessful. Symptoms affecting quality of life and has also noticed decreased balance. Recently symptoms of UE and imaging showing bulging disc above surgical site. Per pt, \" when I went to the doctor, he said he wanted to get my back taken care of first\" prior to addressing neck with surgery ---pt requested PT for low back to try to avoid or prolong need for surgery.       *Pt with right sided pelvic fx several years ago-- some current groin and low back pain symptoms mimic pain of this        Location:   Pain 1(P1):low back and right groin (chief c/o)--constant low back pain wrapping around to right groin, severe stiffness in AM;   denies numbness and tingling, or pain into BLE,      Pain 2(P2): cervical- B posterior neck pain intermittent radiating into B shoulders,        Pain Severity: low back and hip pain   Current:5/10     Best: 5/10  Worst: 9/10    Aggravating factors:  standing prolonged, walking > 20 min, sit to stand, stairs (blalance), stairs    · Relieving factors: sitting, laying down   · Irritability: Medium (Onset of pain is equal to alleviation)    Past Medical History/Comorbidities:   Ms. Jillian Horne  has a past medical history of Abnormal Papanicolaou smear of cervix, Anemia, Arthritis, Bowel trouble, Chronic pain, Depression, Ear problems, Elective , Fatigue (2012), GERD (gastroesophageal reflux disease), History of lumbar laminectomy (2019), Hypertension, Menopause, Miscarriage, Neck pain (2012), Pain in breast, Pelvis fracture, right ([address] Psychiatric disorder, Reflux esophagitis (2012), and Sinus congestion (2014). Ms. Jillian Horne  has a past surgical history that includes us guided core breast biopsy (); hx colonoscopy (2009); hx endoscopy; hx orthopaedic (Left, ); hx cataract removal (Bilateral, ); neurological procedure unlisted (10/17/2018); and hx back surgery (2019).     Active Ambulatory Problems     Diagnosis Date Noted    Hypertension 2012    Fatigue 2012    Gastro-esophageal reflux disease with esophagitis 2012    Neck pain 2012    Sinus congestion 2014    Microhematuria 2015    B12 deficiency 2015    Renal cyst, congenital, right 2015    Depression with anxiety 2016    Hx of fracture of pelvis 2016    Back pain 2016    Post-viral cough syndrome 10/24/2017    Chronic pelvic pain in female 2017    Chronic narcotic use 2017    Other chronic pain 2018    Cervical spinal stenosis 05/15/2018    Cervical radiculopathy at C5 2018    Acute blood loss as cause of postoperative anemia 2019    DDD (degenerative disc disease), lumbar 2018    Numbness and tingling 05/15/2018    Pain in both hands 10/03/2018    Neuropathy 2020    Weakness of both legs 2020    Postural imbalance 2020    Numbness and tingling of both feet 2020     Resolved Ambulatory Problems     Diagnosis Date Noted    Stenosis of cervical spine with myelopathy ([facility] 10/17/2018    Spinal stenosis 2019    Erythromelalgia ([facility] 10/03/2018     Past Medical History:   Diagnosis Date    Abnormal Papanicolaou smear of cervix     Anemia     Arthritis     Bowel trouble     Chronic pain     Depression     Ear problems     Elective      GERD (gastroesophageal reflux disease)     History of lumbar laminectomy 2019    Menopause     Miscarriage     Pain in breast     Pelvis fracture, right ([facility]     Psychiatric disorder     Reflux esophagitis 2012     Social History/Living Environment:   Lives alone     Social History     Socioeconomic History    Marital status:      Spouse name: Not on file    Number of children: Not on file    Years of education: Not on file    Highest education level: Not on file   Occupational History    Not on file   Tobacco Use    Smoking status: Never Smoker    Smokeless tobacco: Never Used   Substance and Sexual Activity    Alcohol use: No    Drug use: No    Sexual activity: Yes     Partners: Male     Birth control/protection: None     Comment: post-menopausal    Other Topics Concern     Service Not Asked    Blood Transfusions Not Asked    Caffeine Concern Not Asked    Occupational Exposure Not Asked    Hobby Hazards Not Asked    Sleep Concern Not Asked    Stress Concern Not Asked    Weight Concern Not Asked    Special Diet Not Asked    Back Care Not Asked    Exercise Not Asked    Bike Helmet Not Asked    Seat Belt Not Asked    Self-Exams Not Asked   Social History Narrative    Not on file     Social Determinants of Health     Financial Resource Strain:     Difficulty of Paying Living Expenses:    Food Insecurity:     Worried About Running Out of Food in the Last Year:     Ran Out of Food in the Last Year:    Transportation Needs:     Lack of Transportation (Medical):  Lack of Transportation (Non-Medical):    Physical Activity:     Days of Exercise per Week:     Minutes of Exercise per Session:    Stress:     Feeling of Stress :    Social Connections:     Frequency of Communication with Friends and Family:     Frequency of Social Gatherings with Friends and Family:     Attends Jew Services:     Active Member of Clubs or Organizations:     Attends Club or Organization Meetings:     Marital Status:    Intimate Partner Violence:     Fear of Current or Ex-Partner:     Emotionally Abused:     Physically Abused:     Sexually Abused:       Prior Level of Function/Work/Activity:  Not working     Other Clinical Tests:          none    Previous Treatment Approaches:          Previous Physical Therapy , Chiropractic Care and Injections  Current Medications:    Current Outpatient Medications:     ferrous sulfate (Iron) 325 mg (65 mg iron) tablet, Take  by mouth Daily (before breakfast). , Disp: , Rfl:     clotrimazole-betamethasone (LOTRISONE) topical cream, Apply  to affected area two (2) times a day., Disp: , Rfl:     HYDROcodone-acetaminophen (NORCO) 7.5-325 mg per tablet, Take 1 Tab by mouth every six (6) hours as needed. , Disp: , Rfl:     cyclobenzaprine (FLEXERIL) 10 mg tablet, Take 10 mg by mouth three (3) times daily. , Disp: , Rfl:     LORazepam (ATIVAN) 1 mg tablet, TAKE ONE TABLET BY MOUTH TWICE A DAY AS NEEDED FOR ANXIETY, Disp: 60 Tab, Rfl: 2    pantoprazole (PROTONIX) 40 mg tablet, TAKE ONE TABLET BY MOUTH ONE TIME DAILY, Disp: 90 Tab, Rfl: 0    pregabalin (LYRICA) 100 mg capsule, Take 50 mg by mouth three (3) times daily. , Disp: , Rfl:     zolpidem (AMBIEN) 10 mg tablet, TAKE ONE TABLET BY MOUTH AT BEDTIME, Disp: 30 Tab, Rfl: 3    medroxyPROGESTERone (PROVERA) 2.5 mg tablet, Take 1 Tab by mouth daily. , Disp: 30 Tab, Rfl: 5    amLODIPine (NORVASC) 5 mg tablet, TAKE ONE TABLET BY MOUTH ONE TIME DAILY, Disp: 90 Tab, Rfl: 3    valsartan (DIOVAN) 160 mg tablet, TAKE ONE TABLET BY MOUTH ONE TIME DAILY, Disp: 90 Tab, Rfl: 3    conjugated estrogens (PREMARIN) 1.25 mg tablet, TAKE 1 TABLET BY MOUTH DAILY, Disp: 90 Tab, Rfl: 3    buPROPion XL (WELLBUTRIN XL) 300 mg XL tablet, TAKE ONE TABLET BY MOUTH ONE TIME DAILY, Disp: 90 Tab, Rfl: 3    cholecalciferol (VITAMIN D3) 1,000 unit tablet, Take 5,000 Units by mouth daily. , Disp: , Rfl:     vitamin e (E GEMS) 1,000 unit capsule, Take 1,000 Units by mouth daily. , Disp: , Rfl:         Ambulatory/Rehab Services H2 Model Falls Risk Assessment    Risk Factors:       No Risk Factors Identified Ability to Rise from Chair:       (1)  Pushes up, successful in one attempt    Falls Prevention Plan:              No modifications necessary   Total: (5 or greater = High Risk): 1    ©2010 [facility] [employer]. All Rights Reserved. [facility] [employer] Patent #[id number].  Federal Law prohibits the replication, distribution or use without written permission from [facility] [employer]         Date Last Reviewed:  7/19/2021     0: LOW COMPLEXITY   EXAMINATION:   Observation/Orthostatic Postural Assessment:    · Standing: tends to stand with NBOS, hyperextended knees;     · Supine: Right LE longer than left   · Sitting: tends to cross right LE over left in rested seated position     Palpation:          Right iliopsoas tender and tight     ROM:           Joint: Eval Date: 7/19/2021     Active ROM          Cervical Extension/ Flexion WFL    Cervical rotation  WFL B              Lumbar Extension WNL WNL   Lumbar Flexion WNL WNL    RIGHT LEFT   Lumbar Sidebending 50% 50%   Lumbar Rotation [facility] WFL        Hip Flexion/extension  [facility]   Hip Rotation  ER and IR limited  WFL              Strength:     Eval Date: 7/19/2021     Lower quadrant   RIGHT LEFT   Hip  Flexion  4/5  4+/5   Hip Abduction   4+/5  4+/5   Knee flexion   4+/5  4+/5   Knee Extension   4+/5  4+/5   Ankle PF   4+/5  4+/5   Ankle DF  4+/5  4+/5   Upper quadrant       Shoulder  ER and flex limited  WNL                           Special Tests:    Lower limb tension test              Neg sciatic      Patient denies Diplopia, Drop attacks, , Dizziness or Dysphagia      Neurological Screen:   sensation, dermatomes, myotomes intact      Functional Mobility:    Sit to stand: mild use of UE's, NBOS, slow  Gait: NBOS, no LOB noted today     Balance: SLS right 5 sec                           Left < 15    Body Structures Involved:  1. Nerves  2. Joints  3. Muscles  4. Ligaments Body Functions Affected:  1. Sensory/Pain  2. Neuromusculoskeletal  3. Movement Related Activities and Participation Affected:  1. Mobility  2. Self Care     Number of elements that affect the Plan of Care: 1-2: LOW COMPLEXITY   CLINICAL PRESENTATION:   Presentation: Stable and uncomplicated: LOW COMPLEXITY   CLINICAL DECISION MAKING:      Use of outcome tool(s) and clinical judgement create a POC that gives a: Clear prediction of patient's progress: LOW COMPLEXITY   See treatment note for associated treatment provided today.       Future Appointments   Date Time Provider Carolann Molina   7/21/2021  2:30 PM Glo Kearney, PT [facility] AND [facility]   7/26/2021  2:30 PM Glo Kearney, PT [facility]   7/28/2021  2:30 PM Glo Kearney, PT [facility]   8/2/2021  2:30 PM Glo Kearney PT [facility]   8/4/2021  2:30 PM Tiffanie Buckley, PT [facility]   8/11/2021  2:30 PM Reji Dunn, PT [facility]         Laney Manriquez, PT

## 2021-07-19 NOTE — PROGRESS NOTES
Yue Pinzon  : 1955  [place] / Product Type: Medicare /  Ryan Newton at [facility]. [address] [place] [address] [address] [place] [address], [facility], [address] [place] [address]  [phone]   [phone]                                                          Calos Barrientos MD      OUTPATIENT PHYSICAL THERAPY: Daily Treatment Note 2021   Visit Count:  1    Tx Diagnosis:  Cervicalgia (M54.2)  Muscle Weakness (generalized) (M62.81)  Low back pain (M54.5)  Intervertebral disc disorders with radiculopathy, lumbar region (M51.16)  Abnormal posture (R29.3)  Pain in right hip (M25.551)  Stiffness of right hip, not elsewhere classified (M25.651)  Psoas tendinitis, right hip (M76.11)        Pre-treatment Symptoms/Complaints: See Initial Eval Dated 21 for more details. Pain: Initial:5/10  Medications Last Reviewed:  2021     Post Session: 5/10 (low back/gluteals)    \"my hip feels better! \"   Updated Objective Findings: See Initial Eval for more details. TREATMENT:   THERAPEUTIC EXERCISE: (5 minutes):  Exercises per grid below to improve mobility, strength and balance. Required minimal visual, verbal and manual cues to promote proper body alignment and promote proper body posture. Progressed resistance and complexity of movement as indicated. [employer] Portal Date:  2021 Date:   Date:     Activity/Exercise Parameters Parameters Parameters   Education HEP, POC, PT goals, anatomy/pathology     mulitf iso (hooklying -opp elbow& heel press) 5, 5 sec     Resisted hip strengthening  --NV--     Gluteal stretching  NV                             THERAPEUTIC ACTIVITY: ( 0 minutes): Activities per gid below to improve functional movement related mobility, strength and balance to improve neuro-muscular carryover to daily functional activities for improving patient's quality of life.  Required visual, verbal and manual cues to promote proper body alignment and promote proper body posture/mechanics. Progressed resistance and complexity of movement as indicated. Date:  7/19/2021 Date:   Date:     Activity/Exercise Parameters Parameters Parameters                                                                               MANUAL THERAPY: (20 minutes): Joint mobilization, Soft tissue mobilization was utilized and necessary because of the patient's restricted joint motion and restricted motion of soft tissue mobility. Date  7/19/2021    Technique Used Grade  Level # Time(s) Desired effect/clinical reasoning   DTM   Right iliospoas X 10 min Decreased abnormal tone    Joint mob: med/lat rotation   Right hip  5 bouts  Decreased joint stiffness    Long distraction   Right LE 3 bouts  Decreased joint stiffness                                  *included in HEP    Treatment/Session Summary:    Response to Treatment: Pt demonstrated understanding of POC and initial HEP. No increase in pain or adverse reactions. good release felt with iliopsoas treatment. relief reported. Educated on sitting and standing posture, encouraging symmetrical Wbing, no crossing legs. Pt will benefit from whole body stretching and strengthening HEP with manual intervention when needed    Communication/Consultation:  POC, HEP, PT goals, Faxed initial evaluation to MD.   Equipment provided today: none    Recommendations/Intent for next treatment session:   Next visit will focus on Manual Therapy Core Stability Hip strengthening soft tissue mobilization. Treatment Plan of Care Effective Dates: 7/19/2021 TO 9/17/2021 (60 days).   Frequency/Duration: 2 times a week for 60 Days         Total Treatment Billable Duration:   20  Rx plus Eval   PT Patient Time In/Time Out  Time In: 7235  Time Out: Lai Tovar PT    Future Appointments   Date Time Provider Carolann Molina   7/21/2021  2:30 PM Sg Vazquez PT [facility]   7/26/2021  2:30 PM Jama Summers, PT [facility]   7/28/2021  2:30 PM Jama Summers, PT [facility]   8/2/2021  2:30 PM [facility]   8/4/2021  2:30 PM Landry Burrell, PT [facility]   8/11/2021  2:30 PM Vanessa Babb, Santiago Lara, [facility]

## 2021-07-21 ENCOUNTER — HOSPITAL ENCOUNTER (OUTPATIENT)
Dept: PHYSICAL THERAPY | Age: 66
Discharge: HOME | End: 2021-07-21
Payer: MEDICARE

## 2021-07-21 PROCEDURE — 97140 MANUAL THERAPY 1/> REGIONS: CPT

## 2021-07-21 PROCEDURE — 97110 THERAPEUTIC EXERCISES: CPT

## 2021-07-21 NOTE — PROGRESS NOTES
Kathryn Sun  : 1955  [place] / Product Type: Medicare /  Madonna Martinez at [address] [place] [address]. [address] [facility] [address] [place], [id number] [place] [address]  [phone]   [phone]                                                          Kamari Cool MD      OUTPATIENT PHYSICAL THERAPY: Daily Treatment Note 2021   Visit Count:  2    Tx Diagnosis:  Cervicalgia (M54.2)  Muscle Weakness (generalized) (M62.81)  Low back pain (M54.5)  Intervertebral disc disorders with radiculopathy, lumbar region (M51.16)  Abnormal posture (R29.3)  Pain in right hip (M25.551)  Stiffness of right hip, not elsewhere classified (M25.651)  Psoas tendinitis, right hip (M76.11)        Pre-treatment Symptoms/Complaints: Pt states her R hip feels much better and was able to walk a lot more yesterday without it bothering her. Back is still bothering her. Reports compliance with HEP. Pain: Initial:5/10  Medications Last Reviewed:  2021     Post Session: 5/10 (low back/gluteals)      Updated Objective Findings: able to complete 43 minutes of ther ex        TREATMENT:   THERAPEUTIC EXERCISE: (28 minutes):  Exercises per grid below to improve mobility, strength and balance. Required minimal visual, verbal and manual cues to promote proper body alignment and promote proper body posture. Progressed resistance and complexity of movement as indicated.     [employer] Portal Date:  2021 Date:  2021     Activity/Exercise Parameters Parameters   Education HEP, POC, PT goals, anatomy/pathology    mulitf iso (hooklying -opp elbow& heel press) 5, 5 sec 5x/side, 5 sec hold with post pelvic tilt   Resisted hip strengthening  --NV--    Gluteal stretching  NV    Nustep  6 min   Open books  x15/side   Glute set  X10, 5 sec hold   Bridge  2x10   Supine march  2x10, up, up, down, down   Hooklying clam  2x10, YTB   Row, free motion  2x8, 13 lbs                   THERAPEUTIC ACTIVITY: ( 0 minutes): Activities per gid below to improve functional movement related mobility, strength and balance to improve neuro-muscular carryover to daily functional activities for improving patient's quality of life. Required visual, verbal and manual cues to promote proper body alignment and promote proper body posture/mechanics. Progressed resistance and complexity of movement as indicated. Date:  7/21/2021 Date:   Date:     Activity/Exercise Parameters Parameters Parameters                                                                               MANUAL THERAPY: (15 minutes): Joint mobilization, Soft tissue mobilization was utilized and necessary because of the patient's restricted joint motion and restricted motion of soft tissue mobility. Date  7/21/2021    Technique Used Grade Level # Time(s) Desired effect/clinical reasoning   DTM   Right iliospoas X 10 min Decreased abnormal tone    Long distraction   Right LE 5 min Decreased joint stiffness                                  *included in HEP    Treatment/Session Summary:    Response to Treatment: Pt demonstrated understanding of POC and initial HEP. No increase in pain or adverse reactions. Continued MT initiated at IE due to favorable response. Followed up with additional lumbopelvic stabilization exercises, with emphasis on abdominal and glute activation. Also addressed postural deficits with periscapular strengthening and thoracic mobility. Communication/Consultation:  Reviewed HEP   Equipment provided today: none    Recommendations/Intent for next treatment session:   Next visit will focus on Manual Therapy Core Stability Hip strengthening soft tissue mobilization. Treatment Plan of Care Effective Dates: 7/19/2021 TO 9/17/2021 (60 days).   Frequency/Duration: 2 times a week for 60 Days         Total Treatment Billable Duration:   45   PT Patient Time In/Time Out  Time In: 4428  Time Out: 3779 Treatment performed by Aura Meier, PT, DPT    Doreen Deleon, PT    Future Appointments   Date Time Provider Carolann Azuli   7/26/2021  2:30 PM Lauri Urbina, PT [facility]   7/28/2021  2:30 PM Lauri Urbina, PT [facility]   8/2/2021  2:30 PM Lauri Urbina, PT [facility]   8/4/2021  2:30 PM Elin Wolfe, PT [facility]   8/11/2021  2:30 PM Cady Stevens, PT [facility]

## 2021-07-26 ENCOUNTER — APPOINTMENT (OUTPATIENT)
Dept: PHYSICAL THERAPY | Age: 66
End: 2021-07-26
Payer: MEDICARE

## 2021-07-28 ENCOUNTER — APPOINTMENT (OUTPATIENT)
Dept: PHYSICAL THERAPY | Age: 66
End: 2021-07-28
Payer: MEDICARE

## 2021-08-02 ENCOUNTER — HOSPITAL ENCOUNTER (OUTPATIENT)
Dept: PHYSICAL THERAPY | Age: 66
Discharge: HOME | End: 2021-08-02
Payer: MEDICARE

## 2021-08-02 PROCEDURE — 97110 THERAPEUTIC EXERCISES: CPT

## 2021-08-02 NOTE — PROGRESS NOTES
Kalpana Higgins  : 1955  [place] / Product Type: Medicare /  [facility] at [address] [place] [address]. Cameron Salinas., [address], [address] [place] [address]  [phone]   [phone]                                                          Glean Skiff, MD      OUTPATIENT PHYSICAL THERAPY: Daily Treatment Note 2021   Visit Count:  3    Tx Diagnosis:  Cervicalgia (M54.2)  Muscle Weakness (generalized) (M62.81)  Low back pain (M54.5)  Intervertebral disc disorders with radiculopathy, lumbar region (M51.16)  Abnormal posture (R29.3)  Pain in right hip (M25.551)  Stiffness of right hip, not elsewhere classified (M25.651)  Psoas tendinitis, right hip (M76.11)        Pre-treatment Symptoms/Complaints: Pt rpeorts mild pain in her back and increased soreness after last session. Pain: Initial:10  Medications Last Reviewed:  2021     Post Session: 10 (low back/gluteals)      Updated Objective Findings: able to complete 43 minutes of ther ex        TREATMENT:   THERAPEUTIC EXERCISE: (40 minutes):  Exercises per grid below to improve mobility, strength and balance. Required minimal visual, verbal and manual cues to promote proper body alignment and promote proper body posture. Progressed resistance and complexity of movement as indicated. [employer] Date:  2021 Date:  2021   Date  2021   Activity/Exercise Parameters Parameters    Education HEP, POC, PT goals, anatomy/pathology  Pt had a long talk on sensitivity  And back pain.     mulitf iso (hooklying -opp elbow& heel press) 5, 5 sec 5x/side, 5 sec hold with post pelvic tilt    Resisted hip strengthening  --NV--     Gluteal stretching  NV     Nustep  6 min    Open books  x15/side    Glute set  X10, 5 sec hold    Bridge  2x10    Supine march  2x10, up, up, down, down    Hooklying clam  2x10, YTB    Row, free motion  2x8, 13 lbs    Walking    10 min    L-Stretch   2 min Single Knee    10xs   Child spose                  THERAPEUTIC ACTIVITY: ( 0 minutes): Activities per gid below to improve functional movement related mobility, strength and balance to improve neuro-muscular carryover to daily functional activities for improving patient's quality of life. Required visual, verbal and manual cues to promote proper body alignment and promote proper body posture/mechanics. Progressed resistance and complexity of movement as indicated. Date:  8/2/2021 Date:   Date:     Activity/Exercise Parameters Parameters Parameters                                                                               MANUAL THERAPY: (0 minutes): Joint mobilization, Soft tissue mobilization was utilized and necessary because of the patient's restricted joint motion and restricted motion of soft tissue mobility. Date  8/2/2021    Technique Used Grade Level # Time(s) Desired effect/clinical reasoning                                               *included in HEP    Treatment/Session Summary:    Response to Treatment: Pt had limiations on where we could treat her today. the focus was to work on a few exercises to ease pain while she was at home    Communication/Consultation:  Reviewed HEP   Equipment provided today: none    Recommendations/Intent for next treatment session:   Next visit will focus on Manual Therapy Core Stability Hip strengthening soft tissue mobilization. Treatment Plan of Care Effective Dates: 7/19/2021 TO 9/17/2021 (60 days).   Frequency/Duration: 2 times a week for 60 Days         Total Treatment Billable Duration:   40     Time in/ Time out   Time in: 1430   Time out:1510    Trung Carlos PT    Future Appointments   Date Time Provider Carolann Molina   8/4/2021  2:30 PM Juvenal Huddleston PT [facility] AND [facility]   8/11/2021  2:30 PM Fito Hernández, PT [facility]

## 2021-08-04 ENCOUNTER — HOSPITAL ENCOUNTER (OUTPATIENT)
Dept: PHYSICAL THERAPY | Age: 66
Discharge: HOME | End: 2021-08-04
Payer: MEDICARE

## 2021-08-04 PROCEDURE — 97530 THERAPEUTIC ACTIVITIES: CPT

## 2021-08-04 NOTE — PROGRESS NOTES
Arti Schrader  : 1955  [place] / Product Type: Medicare /  Gracie Rivas at [address] [place] [address]. Christina Patel, [address], [id number] [place] [address]  [phone]   [phone]                                                          Francisco Coughlin MD      OUTPATIENT PHYSICAL THERAPY: Daily Treatment Note 2021   Visit Count:  4    Tx Diagnosis:  Cervicalgia (M54.2)  Muscle Weakness (generalized) (M62.81)  Low back pain (M54.5)  Intervertebral disc disorders with radiculopathy, lumbar region (M51.16)  Abnormal posture (R29.3)  Pain in right hip (M25.551)  Stiffness of right hip, not elsewhere classified (M25.651)  Psoas tendinitis, right hip (M76.11)        Pre-treatment Symptoms/Complaints: \" I think those exercises last few times are too much for me . ..everything in my body hurts\"   Pain: Initial:7/10  Medications Last Reviewed:  2021     Post Session:  min /10      Updated Objective Findings: Increased lumbar lordosis         TREATMENT:   THERAPEUTIC EXERCISE: (0 minutes):  Exercises per grid below to improve mobility, strength and balance. Required minimal visual, verbal and manual cues to promote proper body alignment and promote proper body posture. Progressed resistance and complexity of movement as indicated. [employer] Portal Date:  2021 Date:  2021   Date  2021    Activity/Exercise Parameters Parameters     Education HEP, POC, PT goals, anatomy/pathology  Pt had a long talk on sensitivity  And back pain.      mulitf iso (hooklying -opp elbow& heel press) 5, 5 sec 5x/side, 5 sec hold with post pelvic tilt     Resisted hip strengthening  --NV--      Gluteal stretching  NV      Nustep  6 min     Open books  x15/side     Glute set  X10, 5 sec hold     Bridge  2x10     Supine march  2x10, up, up, down, down     Hooklying clam  2x10, YTB     Row, free motion  2x8, 13 lbs     Walking    10 min     L-Stretch   2 min Single Knee    10xs    Child spose                    THERAPEUTIC ACTIVITY: ( 40 minutes): Activities per gid below to improve functional movement related mobility, strength and balance to improve neuro-muscular carryover to daily functional activities for improving patient's quality of life. Required visual, verbal and manual cues to promote proper body alignment and promote proper body posture/mechanics. Progressed resistance and complexity of movement as indicated. Date:  8/4/2021 Date:   Date:     Activity/Exercise Parameters Parameters Parameters   TrA brace  Instruction W/ stabilizer x  20 reps       TrA brace with march  X 10 min       TrA brace knee fall  Out  X 10 min                                                      MANUAL THERAPY: (0 minutes): Joint mobilization, Soft tissue mobilization was utilized and necessary because of the patient's restricted joint motion and restricted motion of soft tissue mobility. Date  8/4/2021    Technique Used Grade Level # Time(s) Desired effect/clinical reasoning                                               *included in HEP    Treatment/Session Summary:    Response to Treatment: TrA work was focus of today's session to improve lumbo pelvic stability and decrease back strain. Pt required guidance and instruction of above activities but able to improve TrA contraction and control. difficulty with left march/TrA especially. Limited to TrA brace to add to HEP and will f/u next visit        Communication/Consultation:  instruction and education on role of abdominal and pelvic floor muscles on lumbar stability and strain. Equipment provided today: none    Recommendations/Intent for next treatment session:   Next visit will focus on Manual Therapy Core Stability Hip strengthening soft tissue mobilization. Treatment Plan of Care Effective Dates: 7/19/2021 TO 9/17/2021 (60 days).   Frequency/Duration: 2 times a week for 60 Days Total Treatment Billable Duration:   40 min     Time in/Time out:   Time in: 1430   Time out:1510    Faizan Harris PT    Future Appointments   Date Time Provider Carolann Molina   8/11/2021  2:30 PM Candie Cruz PT [facility] AND [facility]

## 2021-08-12 ENCOUNTER — HOSPITAL ENCOUNTER (OUTPATIENT)
Dept: PHYSICAL THERAPY | Age: 66
Discharge: HOME | End: 2021-08-11
Payer: MEDICARE

## 2021-11-03 ENCOUNTER — TRANSCRIBE ORDER (OUTPATIENT)
Dept: SCHEDULING | Age: 66
End: 2021-11-03

## 2021-11-03 DIAGNOSIS — M54.12 CERVICAL RADICULOPATHY: Primary | ICD-10-CM

## 2021-11-11 ENCOUNTER — HOSPITAL ENCOUNTER (OUTPATIENT)
Dept: CT IMAGING | Age: 66
Discharge: HOME | End: 2021-11-11
Attending: ANESTHESIOLOGY
Payer: MEDICARE

## 2021-11-11 DIAGNOSIS — M54.12 CERVICAL RADICULOPATHY: ICD-10-CM

## 2021-11-11 PROCEDURE — 72125 CT NECK SPINE W/O DYE: CPT

## 2021-11-29 ENCOUNTER — TRANSCRIBE ORDER (OUTPATIENT)
Dept: SCHEDULING | Age: 66
End: 2021-11-29

## 2021-11-29 DIAGNOSIS — Z12.31 SCREENING MAMMOGRAM FOR HIGH-RISK PATIENT: Primary | ICD-10-CM

## 2021-12-06 ENCOUNTER — HOSPITAL ENCOUNTER (OUTPATIENT)
Dept: MAMMOGRAPHY | Age: 66
Discharge: HOME | End: 2021-12-06
Attending: INTERNAL MEDICINE
Payer: MEDICARE

## 2021-12-06 DIAGNOSIS — Z12.31 SCREENING MAMMOGRAM FOR HIGH-RISK PATIENT: ICD-10-CM

## 2021-12-06 PROCEDURE — 77063 BREAST TOMOSYNTHESIS BI: CPT

## 2022-02-23 ENCOUNTER — HOSPITAL ENCOUNTER (OUTPATIENT)
Dept: SURGERY | Age: 67
Discharge: HOME | End: 2022-02-23
Attending: ORTHOPAEDIC SURGERY
Payer: MEDICARE

## 2022-02-23 VITALS
SYSTOLIC BLOOD PRESSURE: 137 MMHG | DIASTOLIC BLOOD PRESSURE: 58 MMHG | OXYGEN SATURATION: 97 % | WEIGHT: 138.2 LBS | RESPIRATION RATE: 16 BRPM | HEART RATE: 76 BPM | HEIGHT: 63 IN | TEMPERATURE: 97.7 F | BODY MASS INDEX: 24.49 KG/M2

## 2022-02-23 LAB
ANION GAP SERPL CALC-SCNC: 4 MMOL/L (ref 7–16)
APPEARANCE UR: CLEAR
BACTERIA SPEC CULT: NORMAL
BACTERIA URNS QL MICRO: 0 /HPF
BASOPHILS # BLD: 0.1 K/UL (ref 0–0.2)
BASOPHILS NFR BLD: 1 % (ref 0–2)
BILIRUB UR QL: NEGATIVE
BUN SERPL-MCNC: 15 MG/DL (ref 8–23)
CALCIUM SERPL-MCNC: 8.9 MG/DL (ref 8.3–10.4)
CASTS URNS QL MICRO: ABNORMAL /LPF
CHLORIDE SERPL-SCNC: 105 MMOL/L (ref 98–107)
CO2 SERPL-SCNC: 28 MMOL/L (ref 21–32)
COLOR UR: YELLOW
CREAT SERPL-MCNC: 0.66 MG/DL (ref 0.6–1)
DIFFERENTIAL METHOD BLD: ABNORMAL
EOSINOPHIL # BLD: 0.2 K/UL (ref 0–0.8)
EOSINOPHIL NFR BLD: 3 % (ref 0.5–7.8)
EPI CELLS #/AREA URNS HPF: 0 /HPF
ERYTHROCYTE [DISTWIDTH] IN BLOOD BY AUTOMATED COUNT: 12.3 % (ref 11.9–14.6)
GLUCOSE SERPL-MCNC: 83 MG/DL (ref 65–100)
GLUCOSE UR STRIP.AUTO-MCNC: NEGATIVE MG/DL
HCT VFR BLD AUTO: 37 % (ref 35.8–46.3)
HGB BLD-MCNC: 12.2 G/DL (ref 11.7–15.4)
HGB UR QL STRIP: ABNORMAL
IMM GRANULOCYTES # BLD AUTO: 0 K/UL (ref 0–0.5)
IMM GRANULOCYTES NFR BLD AUTO: 0 % (ref 0–5)
KETONES UR QL STRIP.AUTO: NEGATIVE MG/DL
LEUKOCYTE ESTERASE UR QL STRIP.AUTO: NEGATIVE
LYMPHOCYTES # BLD: 2.7 K/UL (ref 0.5–4.6)
LYMPHOCYTES NFR BLD: 42 % (ref 13–44)
MCH RBC QN AUTO: 31.1 PG (ref 26.1–32.9)
MCHC RBC AUTO-ENTMCNC: 33 G/DL (ref 31.4–35)
MCV RBC AUTO: 94.4 FL (ref 79.6–97.8)
MONOCYTES # BLD: 0.6 K/UL (ref 0.1–1.3)
MONOCYTES NFR BLD: 9 % (ref 4–12)
NEUTS SEG # BLD: 2.9 K/UL (ref 1.7–8.2)
NEUTS SEG NFR BLD: 45 % (ref 43–78)
NITRITE UR QL STRIP.AUTO: NEGATIVE
NRBC # BLD: 0 K/UL (ref 0–0.2)
PH UR STRIP: 5.5 [PH] (ref 5–9)
PLATELET # BLD AUTO: 283 K/UL (ref 150–450)
PMV BLD AUTO: 9.7 FL (ref 9.4–12.3)
POTASSIUM SERPL-SCNC: 4.5 MMOL/L (ref 3.5–5.1)
PROT UR STRIP-MCNC: NEGATIVE MG/DL
RBC # BLD AUTO: 3.92 M/UL (ref 4.05–5.2)
RBC #/AREA URNS HPF: ABNORMAL /HPF
SERVICE CMNT-IMP: NORMAL
SODIUM SERPL-SCNC: 137 MMOL/L (ref 136–145)
SP GR UR REFRACTOMETRY: 1.01 (ref 1–1.02)
UROBILINOGEN UR QL STRIP.AUTO: 0.2 EU/DL (ref 0.2–1)
WBC # BLD AUTO: 6.5 K/UL (ref 4.3–11.1)
WBC URNS QL MICRO: 0 /HPF

## 2022-02-23 PROCEDURE — 87641 MR-STAPH DNA AMP PROBE: CPT

## 2022-02-23 PROCEDURE — 77030027138 HC INCENT SPIROMETER -A

## 2022-02-23 PROCEDURE — 80048 BASIC METABOLIC PNL TOTAL CA: CPT

## 2022-02-23 PROCEDURE — 81001 URINALYSIS AUTO W/SCOPE: CPT

## 2022-02-23 PROCEDURE — 85025 COMPLETE CBC W/AUTO DIFF WBC: CPT

## 2022-02-23 RX ORDER — HYDROCODONE BITARTRATE AND ACETAMINOPHEN 10; 325 MG/1; MG/1
1 TABLET ORAL
COMMUNITY
End: 2022-03-07

## 2022-02-23 RX ORDER — HYDROXYZINE PAMOATE 100 MG/1
100 CAPSULE ORAL
COMMUNITY
End: 2022-05-02 | Stop reason: ALTCHOICE

## 2022-02-23 RX ORDER — SIMETHICONE 125 MG
125 CAPSULE ORAL
COMMUNITY

## 2022-02-23 NOTE — PERIOP NOTES
Patient verified name, , and surgery as listed in [facility]. Patient provided medical/health information and PTA medications to the best of their ability. TYPE  CASE: III  Orders per surgeon: MRSA,  Labs per surgeon:CBC, BMP, UA  Labs per anesthesia protocol:T&S    EKG:  Not required per protocol    Patient COVID test date 22; Patient aware of the appointment. The testing center is located at the [address], [place]. If appointment is needed patient provided telephone number of [phone]. Nasal Swab collected per MD order. Patient provided with and instructed on education handouts including Guide to Surgery, blood transfusions, pain management, and hand hygiene for the family and community, and [facility] brochure. Road to Recovery Spine surgery patient guide given. Instructed on incentive spirometry with return demonstration. Patient viewed spine prehab video. Hibiclens and instructions given per hospital policy. Original medication prescription bottles were visualized during patient appointment. Patient teach back successful and patient demonstrates knowledge of instruction.

## 2022-02-23 NOTE — PERIOP NOTES
PLEASE CONTINUE TAKING ALL PRESCRIPTION MEDICATIONS UP TO THE DAY OF SURGERY UNLESS OTHERWISE DIRECTED BELOW. DISCONTINUE all vitamins and supplements 7 days prior to surgery. DISCONTINUE Non-Steriodal Anti-Inflammatory (NSAIDS) such as Advil and Aleve 5 days prior to surgery. Home Medications to take  the day of surgery    Amlodipine (Norvasc)  bupropion (Wellbutrin)   Buspirone (Buspar)   Premarin   Hydrocodone-acetaminophen (Norco) if needed   Lorazepam (Ativan) if needed  Provera  Pantoprazole (Protonix)  Pregabalin (Lyrica)          Home Medications   to Hold   Celecoxib (Celebrex) hold for 5 days prior to surgery   Hold Valsartan (Diovan), Guaifenesin (Mucinex), and  Cyclobenzaprine (Flexeril)am of surgery     Comments    Covid test 2/28/22 @ [facility] [facility], [address] [facility]             Please do not bring home medications with you on the day of surgery unless otherwise directed by your nurse. If you are instructed to bring home medications, please give them to your nurse as they will be administered by the nursing staff. If you have any questions, please call [facility] [phone] or [address] [place] [address] [phone]. A copy of this note was provided to the patient for reference. How to Use Your Incentive Spirometer       About Your Incentive Spirometer  An incentive spirometer is a device that will expand your lungs by helping you to breathe more deeply and fully. The parts of your incentive spirometer are labeled in Figure 1. Using your incentive spirometer  When youre using your incentive spirometer, make sure to breathe through your mouth. If you breathe through your nose, the incentive spirometer wont work properly. You can hold your nose if you have trouble. DO NOT BLOW INTO THE DEVICE. If you feel dizzy at any time, stop and rest. Try again at a later time. 1. Sit upright in a chair or in bed. Hold the incentive spirometer at eye level.    2. Put the mouthpiece in your mouth and close your lips tightly around it. Slowly breathe out (exhale) completely. 3. Breathe in (inhale) slowly through your mouth as deeply as you can. As you take the breath, you will see the piston rise inside the large column. While the piston rises, the indicator on the right should move upwards. It should stay in between the 2 arrows (see Figure 1). 4. Try to get the piston as high as you can, while keeping the indicator between the arrows. If the indicator doesnt stay between the arrows, youre breathing either too fast or too slow. 5. When you get it as high as you can, hold your breath for 10 seconds, or as long as possible. While youre holding your breath, the piston will slowly fall to the base of the spirometer. 6. Once the piston reaches the bottom of the spirometer, breathe out slowly through your mouth. Rest for a few seconds. 7. Repeat 10 times. Try to get the piston to the same level with each breath. 8. After each set of 10 breaths, try to cough as coughing will help loosen or clear any mucus in your lungs. 9. Put the marker at the level the piston reached on your incentive spirometer. This will be your goal next time. Repeat these steps every hour that youre awake.   Cover the mouthpiece of the incentive spirometer when you arent using it

## 2022-02-23 NOTE — PERIOP NOTES
Recent Results (from the past 12 hour(s))   CBC WITH AUTOMATED DIFF    Collection Time: 02/23/22 11:28 AM   Result Value Ref Range    WBC 6.5 4.3 - 11.1 K/uL    RBC 3.92 (L) 4.05 - 5.2 M/uL    HGB 12.2 11.7 - 15.4 g/dL    HCT 37.0 35.8 - 46.3 %    MCV 94.4 79.6 - 97.8 FL    MCH 31.1 26.1 - 32.9 PG    MCHC 33.0 31.4 - 35.0 g/dL    RDW 12.3 11.9 - 14.6 %    PLATELET 252 514 - 985 K/uL    MPV 9.7 9.4 - 12.3 FL    ABSOLUTE NRBC 0.00 0.0 - 0.2 K/uL    DF AUTOMATED      NEUTROPHILS 45 43 - 78 %    LYMPHOCYTES 42 13 - 44 %    MONOCYTES 9 4.0 - 12.0 %    EOSINOPHILS 3 0.5 - 7.8 %    BASOPHILS 1 0.0 - 2.0 %    IMMATURE GRANULOCYTES 0 0.0 - 5.0 %    ABS. NEUTROPHILS 2.9 1.7 - 8.2 K/UL    ABS. LYMPHOCYTES 2.7 0.5 - 4.6 K/UL    ABS. MONOCYTES 0.6 0.1 - 1.3 K/UL    ABS. EOSINOPHILS 0.2 0.0 - 0.8 K/UL    ABS. BASOPHILS 0.1 0.0 - 0.2 K/UL    ABS. IMM.  GRANS. 0.0 0.0 - 0.5 K/UL   METABOLIC PANEL, BASIC    Collection Time: 02/23/22 11:28 AM   Result Value Ref Range    Sodium 137 136 - 145 mmol/L    Potassium 4.5 3.5 - 5.1 mmol/L    Chloride 105 98 - 107 mmol/L    CO2 28 21 - 32 mmol/L    Anion gap 4 (L) 7 - 16 mmol/L    Glucose 83 65 - 100 mg/dL    BUN 15 8 - 23 MG/DL    Creatinine 0.66 0.6 - 1.0 MG/DL    GFR est AA >60 >60 ml/min/1.73m2    GFR est non-AA >60 >60 ml/min/1.73m2    Calcium 8.9 8.3 - 10.4 MG/DL   URINALYSIS W/ RFLX MICROSCOPIC    Collection Time: 02/23/22 11:28 AM   Result Value Ref Range    Color YELLOW      Appearance CLEAR      Specific gravity 1.010 1.001 - 1.023      pH (UA) 5.5 5.0 - 9.0      Protein Negative NEG mg/dL    Glucose Negative mg/dL    Ketone Negative NEG mg/dL    Bilirubin Negative NEG      Blood TRACE (A) NEG      Urobilinogen 0.2 0.2 - 1.0 EU/dL    Nitrites Negative NEG      Leukocyte Esterase Negative NEG      WBC 0 0 /hpf    RBC 0-3 0 /hpf    Epithelial cells 0 0 /hpf    Bacteria 0 0 /hpf    Casts 0-3 0 /lpf     MRSA pending

## 2022-03-02 ENCOUNTER — ANESTHESIA EVENT (OUTPATIENT)
Dept: SURGERY | Age: 67
DRG: 454 | End: 2022-03-02
Payer: MEDICARE

## 2022-03-03 ENCOUNTER — ANESTHESIA (OUTPATIENT)
Dept: SURGERY | Age: 67
DRG: 454 | End: 2022-03-03
Payer: MEDICARE

## 2022-03-03 ENCOUNTER — APPOINTMENT (OUTPATIENT)
Dept: GENERAL RADIOLOGY | Age: 67
DRG: 454 | End: 2022-03-03
Attending: ORTHOPAEDIC SURGERY
Payer: MEDICARE

## 2022-03-03 ENCOUNTER — HOSPITAL ENCOUNTER (INPATIENT)
Age: 67
LOS: 4 days | Discharge: HOME | DRG: 454 | End: 2022-03-07
Attending: ORTHOPAEDIC SURGERY | Admitting: ORTHOPAEDIC SURGERY
Payer: MEDICARE

## 2022-03-03 DIAGNOSIS — M43.16 SPONDYLOLISTHESIS AT L2-L3 LEVEL: ICD-10-CM

## 2022-03-03 DIAGNOSIS — M48.062 SPINAL STENOSIS OF LUMBAR REGION WITH NEUROGENIC CLAUDICATION: Primary | ICD-10-CM

## 2022-03-03 DIAGNOSIS — Z98.890 STATUS POST LUMBAR SPINE OPERATIVE PROCEDURE FOR DECOMPRESSION OF SPINAL CORD: ICD-10-CM

## 2022-03-03 DIAGNOSIS — M48.061 SPINAL STENOSIS OF LUMBAR REGION AT MULTIPLE LEVELS: ICD-10-CM

## 2022-03-03 DIAGNOSIS — N95.0 POSTMENOPAUSAL BLEEDING: ICD-10-CM

## 2022-03-03 PROBLEM — M48.00 SPINAL STENOSIS: Status: ACTIVE | Noted: 2022-03-03

## 2022-03-03 LAB
BASE DEFICIT BLD-SCNC: 2.1 MMOL/L
CA-I BLD-MCNC: 1.02 MMOL/L (ref 1.12–1.32)
CO2 BLD-SCNC: 23 MMOL/L (ref 13–23)
ERYTHROCYTE [DISTWIDTH] IN BLOOD BY AUTOMATED COUNT: 12.4 % (ref 11.9–14.6)
GLUCOSE BLD STRIP.AUTO-MCNC: 105 MG/DL (ref 65–100)
HCO3 BLD-SCNC: 22.2 MMOL/L (ref 22–26)
HCT VFR BLD AUTO: 24.9 % (ref 35.8–46.3)
HGB BLD-MCNC: 8.2 G/DL (ref 11.7–15.4)
MCH RBC QN AUTO: 30.8 PG (ref 26.1–32.9)
MCHC RBC AUTO-ENTMCNC: 32.9 G/DL (ref 31.4–35)
MCV RBC AUTO: 93.6 FL (ref 79.6–97.8)
NRBC # BLD: 0 K/UL (ref 0–0.2)
PCO2 BLD: 35.5 MMHG (ref 35–45)
PH BLD: 7.41 [PH] (ref 7.35–7.45)
PLATELET # BLD AUTO: 214 K/UL (ref 150–450)
PMV BLD AUTO: 9.6 FL (ref 9.4–12.3)
PO2 BLD: 315 MMHG (ref 75–100)
POTASSIUM BLD-SCNC: 3.4 MMOL/L (ref 3.5–5.1)
RBC # BLD AUTO: 2.66 M/UL (ref 4.05–5.2)
SAO2 % BLD: 100 %
SERVICE CMNT-IMP: ABNORMAL
SODIUM BLD-SCNC: 139 MMOL/L (ref 136–145)
SPECIMEN SITE: ABNORMAL
WBC # BLD AUTO: 10.1 K/UL (ref 4.3–11.1)

## 2022-03-03 PROCEDURE — 76210000016 HC OR PH I REC 1 TO 1.5 HR: Performed by: ORTHOPAEDIC SURGERY

## 2022-03-03 PROCEDURE — 77030019908 HC STETH ESOPH SIMS -A: Performed by: ANESTHESIOLOGY

## 2022-03-03 PROCEDURE — 0SG0071 FUSION OF LUMBAR VERTEBRAL JOINT WITH AUTOLOGOUS TISSUE SUBSTITUTE, POSTERIOR APPROACH, POSTERIOR COLUMN, OPEN APPROACH: ICD-10-PCS | Performed by: ORTHOPAEDIC SURGERY

## 2022-03-03 PROCEDURE — 77030020268 HC MISC GENERAL SUPPLY: Performed by: ORTHOPAEDIC SURGERY

## 2022-03-03 PROCEDURE — 77030038552 HC DRN WND MDII -A: Performed by: ORTHOPAEDIC SURGERY

## 2022-03-03 PROCEDURE — 94760 N-INVAS EAR/PLS OXIMETRY 1: CPT

## 2022-03-03 PROCEDURE — 77030031139 HC SUT VCRL2 J&J -A: Performed by: ORTHOPAEDIC SURGERY

## 2022-03-03 PROCEDURE — 77030019557 HC ELECTRD VES SEAL MEDT -F: Performed by: ORTHOPAEDIC SURGERY

## 2022-03-03 PROCEDURE — 74011000250 HC RX REV CODE- 250: Performed by: ORTHOPAEDIC SURGERY

## 2022-03-03 PROCEDURE — 74011250636 HC RX REV CODE- 250/636: Performed by: ANESTHESIOLOGY

## 2022-03-03 PROCEDURE — 22845 INSERT SPINE FIXATION DEVICE: CPT | Performed by: ORTHOPAEDIC SURGERY

## 2022-03-03 PROCEDURE — 72100 X-RAY EXAM L-S SPINE 2/3 VWS: CPT

## 2022-03-03 PROCEDURE — 65270000029 HC RM PRIVATE

## 2022-03-03 PROCEDURE — 74011250637 HC RX REV CODE- 250/637: Performed by: ORTHOPAEDIC SURGERY

## 2022-03-03 PROCEDURE — 77030037158 HC BIT DRL SPN XIA DISP STRY -C: Performed by: ORTHOPAEDIC SURGERY

## 2022-03-03 PROCEDURE — 63047 LAM FACETEC & FORAMOT LUMBAR: CPT | Performed by: ORTHOPAEDIC SURGERY

## 2022-03-03 PROCEDURE — C1713 ANCHOR/SCREW BN/BN,TIS/BN: HCPCS | Performed by: ORTHOPAEDIC SURGERY

## 2022-03-03 PROCEDURE — 77030037157 HC TAP SPN MOD XIA DISP STRY -C: Performed by: ORTHOPAEDIC SURGERY

## 2022-03-03 PROCEDURE — 82947 ASSAY GLUCOSE BLOOD QUANT: CPT

## 2022-03-03 PROCEDURE — 86900 BLOOD TYPING SEROLOGIC ABO: CPT

## 2022-03-03 PROCEDURE — 0SB20ZZ EXCISION OF LUMBAR VERTEBRAL DISC, OPEN APPROACH: ICD-10-PCS | Performed by: ORTHOPAEDIC SURGERY

## 2022-03-03 PROCEDURE — 22558 ARTHRD ANT NTRBD MIN DSC LUM: CPT | Performed by: ORTHOPAEDIC SURGERY

## 2022-03-03 PROCEDURE — 77030013292 HC BOWL MX PRSM J&J -A: Performed by: ANESTHESIOLOGY

## 2022-03-03 PROCEDURE — 77030037088 HC TUBE ENDOTRACH ORAL NSL COVD-A: Performed by: ANESTHESIOLOGY

## 2022-03-03 PROCEDURE — P9045 ALBUMIN (HUMAN), 5%, 250 ML: HCPCS | Performed by: NURSE ANESTHETIST, CERTIFIED REGISTERED

## 2022-03-03 PROCEDURE — 74011250636 HC RX REV CODE- 250/636: Performed by: ORTHOPAEDIC SURGERY

## 2022-03-03 PROCEDURE — 74011250637 HC RX REV CODE- 250/637: Performed by: ANESTHESIOLOGY

## 2022-03-03 PROCEDURE — 74011250636 HC RX REV CODE- 250/636: Performed by: NURSE ANESTHETIST, CERTIFIED REGISTERED

## 2022-03-03 PROCEDURE — 77030028270 HC SRGFL HEMSTAT MTRX J&J -C: Performed by: ORTHOPAEDIC SURGERY

## 2022-03-03 PROCEDURE — 77030028271 HC SRGFL HEMSTAT MTRX KT J&J -C: Performed by: ORTHOPAEDIC SURGERY

## 2022-03-03 PROCEDURE — 86923 COMPATIBILITY TEST ELECTRIC: CPT

## 2022-03-03 PROCEDURE — 01NB0ZZ RELEASE LUMBAR NERVE, OPEN APPROACH: ICD-10-PCS | Performed by: ORTHOPAEDIC SURGERY

## 2022-03-03 PROCEDURE — 77030040830 HC CATH URETH FOL MDII -A: Performed by: ORTHOPAEDIC SURGERY

## 2022-03-03 PROCEDURE — 77030020407 HC IV BLD WRMR ST 3M -A: Performed by: ANESTHESIOLOGY

## 2022-03-03 PROCEDURE — 77030005401 HC CATH RAD ARRO -A: Performed by: ANESTHESIOLOGY

## 2022-03-03 PROCEDURE — 63042 LAMINOTOMY SINGLE LUMBAR: CPT | Performed by: ORTHOPAEDIC SURGERY

## 2022-03-03 PROCEDURE — 22842 INSERT SPINE FIXATION DEVICE: CPT | Performed by: ORTHOPAEDIC SURGERY

## 2022-03-03 PROCEDURE — 77030034479 HC ADH SKN CLSR PRINEO J&J -B: Performed by: ORTHOPAEDIC SURGERY

## 2022-03-03 PROCEDURE — 22853 INSJ BIOMECHANICAL DEVICE: CPT | Performed by: ORTHOPAEDIC SURGERY

## 2022-03-03 PROCEDURE — 77030040922 HC BLNKT HYPOTHRM STRY -A: Performed by: ANESTHESIOLOGY

## 2022-03-03 PROCEDURE — 82803 BLOOD GASES ANY COMBINATION: CPT

## 2022-03-03 PROCEDURE — 77030039425 HC BLD LARYNG TRULITE DISP TELE -A: Performed by: ANESTHESIOLOGY

## 2022-03-03 PROCEDURE — 76010000177 HC OR TIME 5 TO 5.5 HR INTENSV-TIER 1: Performed by: ORTHOPAEDIC SURGERY

## 2022-03-03 PROCEDURE — 77030012894: Performed by: ORTHOPAEDIC SURGERY

## 2022-03-03 PROCEDURE — 03HY32Z INSERTION OF MONITORING DEVICE INTO UPPER ARTERY, PERCUTANEOUS APPROACH: ICD-10-PCS | Performed by: ANESTHESIOLOGY

## 2022-03-03 PROCEDURE — 85027 COMPLETE CBC AUTOMATED: CPT

## 2022-03-03 PROCEDURE — 74011000250 HC RX REV CODE- 250: Performed by: NURSE ANESTHETIST, CERTIFIED REGISTERED

## 2022-03-03 PROCEDURE — 74011000272 HC RX REV CODE- 272: Performed by: ORTHOPAEDIC SURGERY

## 2022-03-03 PROCEDURE — 77030008542 HC TBNG MON PRSS EDWD -A: Performed by: ANESTHESIOLOGY

## 2022-03-03 PROCEDURE — 77030034475 HC MISC IMPL SPN: Performed by: ORTHOPAEDIC SURGERY

## 2022-03-03 PROCEDURE — 77030037155 HC BLCKR SPN CAP LOK AXI STRY -B: Performed by: ORTHOPAEDIC SURGERY

## 2022-03-03 PROCEDURE — 0ST20ZZ RESECTION OF LUMBAR VERTEBRAL DISC, OPEN APPROACH: ICD-10-PCS | Performed by: ORTHOPAEDIC SURGERY

## 2022-03-03 PROCEDURE — 76060000042 HC ANESTHESIA 5.5 TO 6 HR: Performed by: ORTHOPAEDIC SURGERY

## 2022-03-03 PROCEDURE — 77010033678 HC OXYGEN DAILY

## 2022-03-03 PROCEDURE — 0SG00AJ FUSION OF LUMBAR VERTEBRAL JOINT WITH INTERBODY FUSION DEVICE, POSTERIOR APPROACH, ANTERIOR COLUMN, OPEN APPROACH: ICD-10-PCS | Performed by: ORTHOPAEDIC SURGERY

## 2022-03-03 PROCEDURE — 22612 ARTHRD PST TQ 1NTRSPC LUMBAR: CPT | Performed by: ORTHOPAEDIC SURGERY

## 2022-03-03 PROCEDURE — 77030013794 HC KT TRNSDUC BLD EDWD -B: Performed by: ANESTHESIOLOGY

## 2022-03-03 PROCEDURE — 2709999900 HC NON-CHARGEABLE SUPPLY: Performed by: ORTHOPAEDIC SURGERY

## 2022-03-03 DEVICE — POLYAXIAL CORTICAL SCREW
Type: IMPLANTABLE DEVICE | Site: SPINE LUMBAR | Status: FUNCTIONAL
Brand: XIA 4.5 SYSTEM -  XIA CT

## 2022-03-03 DEVICE — BIO DBM PLUS PUTTY (WITH CANCELLOUS)
Type: IMPLANTABLE DEVICE | Site: SPINE LUMBAR | Status: FUNCTIONAL
Brand: BIO DBM

## 2022-03-03 DEVICE — LIF AMP, ONE SCREW PLATE, 04 AND CENTER SCREW, 15MM
Type: IMPLANTABLE DEVICE | Site: SPINE LUMBAR | Status: FUNCTIONAL
Brand: AMP

## 2022-03-03 DEVICE — BLOCKER
Type: IMPLANTABLE DEVICE | Site: SPINE LUMBAR | Status: FUNCTIONAL
Brand: XIA 4.5 SYSTEM -  XIA CT

## 2022-03-03 DEVICE — LIF AMP, Ø5.5MM X 45MM BONE SCREW X2
Type: IMPLANTABLE DEVICE | Site: SPINE LUMBAR | Status: FUNCTIONAL
Brand: AMP

## 2022-03-03 DEVICE — IDENTITI LIF POROUS TI SPACER, 6 X 18 X 50 MM, 10°
Type: IMPLANTABLE DEVICE | Site: SPINE LUMBAR | Status: FUNCTIONAL
Brand: IDENTITI

## 2022-03-03 DEVICE — VITALLIUM PREBENT AND PRECUT ROD WITH HEX
Type: IMPLANTABLE DEVICE | Site: SPINE LUMBAR | Status: FUNCTIONAL
Brand: XIA 4 5

## 2022-03-03 RX ORDER — ONDANSETRON 2 MG/ML
INJECTION INTRAMUSCULAR; INTRAVENOUS AS NEEDED
Status: DISCONTINUED | OUTPATIENT
Start: 2022-03-03 | End: 2022-03-03 | Stop reason: HOSPADM

## 2022-03-03 RX ORDER — ZOLPIDEM TARTRATE 5 MG/1
10 TABLET ORAL
Status: DISCONTINUED | OUTPATIENT
Start: 2022-03-03 | End: 2022-03-07 | Stop reason: HOSPADM

## 2022-03-03 RX ORDER — SODIUM CHLORIDE, SODIUM LACTATE, POTASSIUM CHLORIDE, CALCIUM CHLORIDE 600; 310; 30; 20 MG/100ML; MG/100ML; MG/100ML; MG/100ML
75 INJECTION, SOLUTION INTRAVENOUS CONTINUOUS
Status: DISCONTINUED | OUTPATIENT
Start: 2022-03-03 | End: 2022-03-03 | Stop reason: HOSPADM

## 2022-03-03 RX ORDER — PREGABALIN 75 MG/1
75 CAPSULE ORAL 3 TIMES DAILY
Status: DISCONTINUED | OUTPATIENT
Start: 2022-03-03 | End: 2022-03-07 | Stop reason: HOSPADM

## 2022-03-03 RX ORDER — CEFAZOLIN SODIUM/WATER 2 G/20 ML
2 SYRINGE (ML) INTRAVENOUS ONCE
Status: COMPLETED | OUTPATIENT
Start: 2022-03-03 | End: 2022-03-03

## 2022-03-03 RX ORDER — VALSARTAN 80 MG/1
160 TABLET ORAL DAILY
Status: DISCONTINUED | OUTPATIENT
Start: 2022-03-04 | End: 2022-03-07 | Stop reason: HOSPADM

## 2022-03-03 RX ORDER — ONDANSETRON 4 MG/1
4 TABLET, ORALLY DISINTEGRATING ORAL
Status: DISCONTINUED | OUTPATIENT
Start: 2022-03-03 | End: 2022-03-07 | Stop reason: HOSPADM

## 2022-03-03 RX ORDER — OXYCODONE HYDROCHLORIDE 5 MG/1
5 TABLET ORAL
Status: COMPLETED | OUTPATIENT
Start: 2022-03-03 | End: 2022-03-03

## 2022-03-03 RX ORDER — BUPROPION HYDROCHLORIDE 150 MG/1
150 TABLET, EXTENDED RELEASE ORAL 2 TIMES DAILY
Status: DISCONTINUED | OUTPATIENT
Start: 2022-03-03 | End: 2022-03-07 | Stop reason: HOSPADM

## 2022-03-03 RX ORDER — SODIUM CHLORIDE, SODIUM LACTATE, POTASSIUM CHLORIDE, CALCIUM CHLORIDE 600; 310; 30; 20 MG/100ML; MG/100ML; MG/100ML; MG/100ML
100 INJECTION, SOLUTION INTRAVENOUS CONTINUOUS
Status: DISCONTINUED | OUTPATIENT
Start: 2022-03-03 | End: 2022-03-03 | Stop reason: HOSPADM

## 2022-03-03 RX ORDER — DIPHENHYDRAMINE HYDROCHLORIDE 50 MG/ML
12.5 INJECTION, SOLUTION INTRAMUSCULAR; INTRAVENOUS
Status: DISCONTINUED | OUTPATIENT
Start: 2022-03-03 | End: 2022-03-03 | Stop reason: HOSPADM

## 2022-03-03 RX ORDER — AMLODIPINE BESYLATE 5 MG/1
5 TABLET ORAL DAILY
Status: DISCONTINUED | OUTPATIENT
Start: 2022-03-04 | End: 2022-03-07 | Stop reason: HOSPADM

## 2022-03-03 RX ORDER — SODIUM CHLORIDE 9 MG/ML
INJECTION, SOLUTION INTRAVENOUS
Status: DISCONTINUED | OUTPATIENT
Start: 2022-03-03 | End: 2022-03-03 | Stop reason: HOSPADM

## 2022-03-03 RX ORDER — MIDAZOLAM HYDROCHLORIDE 1 MG/ML
INJECTION, SOLUTION INTRAMUSCULAR; INTRAVENOUS AS NEEDED
Status: DISCONTINUED | OUTPATIENT
Start: 2022-03-03 | End: 2022-03-03 | Stop reason: HOSPADM

## 2022-03-03 RX ORDER — HYDROMORPHONE HYDROCHLORIDE 2 MG/ML
0.5 INJECTION, SOLUTION INTRAMUSCULAR; INTRAVENOUS; SUBCUTANEOUS
Status: COMPLETED | OUTPATIENT
Start: 2022-03-03 | End: 2022-03-03

## 2022-03-03 RX ORDER — LIDOCAINE HYDROCHLORIDE ANHYDROUS AND DEXTROSE MONOHYDRATE .8; 5 G/100ML; G/100ML
INJECTION, SOLUTION INTRAVENOUS
Status: DISCONTINUED | OUTPATIENT
Start: 2022-03-03 | End: 2022-03-03 | Stop reason: HOSPADM

## 2022-03-03 RX ORDER — DEXTROSE, SODIUM CHLORIDE, AND POTASSIUM CHLORIDE 5; .45; .15 G/100ML; G/100ML; G/100ML
100 INJECTION INTRAVENOUS CONTINUOUS
Status: DISCONTINUED | OUTPATIENT
Start: 2022-03-03 | End: 2022-03-07 | Stop reason: HOSPADM

## 2022-03-03 RX ORDER — HYDROXYZINE PAMOATE 50 MG/1
100 CAPSULE ORAL
Status: DISCONTINUED | OUTPATIENT
Start: 2022-03-03 | End: 2022-03-07 | Stop reason: HOSPADM

## 2022-03-03 RX ORDER — LIDOCAINE HYDROCHLORIDE 10 MG/ML
0.1 INJECTION INFILTRATION; PERINEURAL AS NEEDED
Status: DISCONTINUED | OUTPATIENT
Start: 2022-03-03 | End: 2022-03-03 | Stop reason: HOSPADM

## 2022-03-03 RX ORDER — PANTOPRAZOLE SODIUM 40 MG/1
40 TABLET, DELAYED RELEASE ORAL DAILY
Status: DISCONTINUED | OUTPATIENT
Start: 2022-03-04 | End: 2022-03-07 | Stop reason: HOSPADM

## 2022-03-03 RX ORDER — SODIUM CHLORIDE 0.9 % (FLUSH) 0.9 %
5-40 SYRINGE (ML) INJECTION AS NEEDED
Status: DISCONTINUED | OUTPATIENT
Start: 2022-03-03 | End: 2022-03-07 | Stop reason: HOSPADM

## 2022-03-03 RX ORDER — KETAMINE HYDROCHLORIDE 50 MG/ML
INJECTION, SOLUTION INTRAMUSCULAR; INTRAVENOUS AS NEEDED
Status: DISCONTINUED | OUTPATIENT
Start: 2022-03-03 | End: 2022-03-03 | Stop reason: HOSPADM

## 2022-03-03 RX ORDER — DOCUSATE SODIUM 100 MG/1
100 CAPSULE, LIQUID FILLED ORAL 2 TIMES DAILY
Status: DISCONTINUED | OUTPATIENT
Start: 2022-03-03 | End: 2022-03-07 | Stop reason: HOSPADM

## 2022-03-03 RX ORDER — ROCURONIUM BROMIDE 10 MG/ML
INJECTION, SOLUTION INTRAVENOUS AS NEEDED
Status: DISCONTINUED | OUTPATIENT
Start: 2022-03-03 | End: 2022-03-03 | Stop reason: HOSPADM

## 2022-03-03 RX ORDER — CELECOXIB 100 MG/1
100 CAPSULE ORAL 2 TIMES DAILY
Status: DISCONTINUED | OUTPATIENT
Start: 2022-03-03 | End: 2022-03-07 | Stop reason: HOSPADM

## 2022-03-03 RX ORDER — DIPHENHYDRAMINE HCL 25 MG
25 CAPSULE ORAL
Status: DISCONTINUED | OUTPATIENT
Start: 2022-03-03 | End: 2022-03-07 | Stop reason: HOSPADM

## 2022-03-03 RX ORDER — OXYCODONE AND ACETAMINOPHEN 10; 325 MG/1; MG/1
1 TABLET ORAL
Status: DISCONTINUED | OUTPATIENT
Start: 2022-03-03 | End: 2022-03-05

## 2022-03-03 RX ORDER — FENTANYL CITRATE 50 UG/ML
INJECTION, SOLUTION INTRAMUSCULAR; INTRAVENOUS AS NEEDED
Status: DISCONTINUED | OUTPATIENT
Start: 2022-03-03 | End: 2022-03-03 | Stop reason: HOSPADM

## 2022-03-03 RX ORDER — MEDROXYPROGESTERONE ACETATE 10 MG/1
5 TABLET ORAL DAILY
Status: DISCONTINUED | OUTPATIENT
Start: 2022-03-04 | End: 2022-03-07 | Stop reason: HOSPADM

## 2022-03-03 RX ORDER — NALOXONE HYDROCHLORIDE 0.4 MG/ML
0.1 INJECTION, SOLUTION INTRAMUSCULAR; INTRAVENOUS; SUBCUTANEOUS
Status: DISCONTINUED | OUTPATIENT
Start: 2022-03-03 | End: 2022-03-03 | Stop reason: HOSPADM

## 2022-03-03 RX ORDER — DEXAMETHASONE SODIUM PHOSPHATE 100 MG/10ML
INJECTION INTRAMUSCULAR; INTRAVENOUS AS NEEDED
Status: DISCONTINUED | OUTPATIENT
Start: 2022-03-03 | End: 2022-03-03 | Stop reason: HOSPADM

## 2022-03-03 RX ORDER — LIDOCAINE HYDROCHLORIDE ANHYDROUS AND DEXTROSE MONOHYDRATE .8; 5 G/100ML; G/100ML
INJECTION, SOLUTION INTRAVENOUS
Status: DISCONTINUED | OUTPATIENT
Start: 2022-03-03 | End: 2022-03-03

## 2022-03-03 RX ORDER — LIDOCAINE HYDROCHLORIDE 20 MG/ML
INJECTION, SOLUTION EPIDURAL; INFILTRATION; INTRACAUDAL; PERINEURAL AS NEEDED
Status: DISCONTINUED | OUTPATIENT
Start: 2022-03-03 | End: 2022-03-03 | Stop reason: HOSPADM

## 2022-03-03 RX ORDER — NALOXONE HYDROCHLORIDE 0.4 MG/ML
0.4 INJECTION, SOLUTION INTRAMUSCULAR; INTRAVENOUS; SUBCUTANEOUS AS NEEDED
Status: DISCONTINUED | OUTPATIENT
Start: 2022-03-03 | End: 2022-03-07 | Stop reason: HOSPADM

## 2022-03-03 RX ORDER — SUCCINYLCHOLINE CHLORIDE 20 MG/ML
INJECTION INTRAMUSCULAR; INTRAVENOUS AS NEEDED
Status: DISCONTINUED | OUTPATIENT
Start: 2022-03-03 | End: 2022-03-03 | Stop reason: HOSPADM

## 2022-03-03 RX ORDER — MIDAZOLAM HYDROCHLORIDE 1 MG/ML
2 INJECTION, SOLUTION INTRAMUSCULAR; INTRAVENOUS
Status: DISCONTINUED | OUTPATIENT
Start: 2022-03-03 | End: 2022-03-03 | Stop reason: HOSPADM

## 2022-03-03 RX ORDER — CYCLOBENZAPRINE HCL 10 MG
10 TABLET ORAL
Status: DISCONTINUED | OUTPATIENT
Start: 2022-03-03 | End: 2022-03-07 | Stop reason: HOSPADM

## 2022-03-03 RX ORDER — PROPOFOL 10 MG/ML
INJECTION, EMULSION INTRAVENOUS AS NEEDED
Status: DISCONTINUED | OUTPATIENT
Start: 2022-03-03 | End: 2022-03-03 | Stop reason: HOSPADM

## 2022-03-03 RX ORDER — ACETAMINOPHEN 500 MG
1000 TABLET ORAL ONCE
Status: COMPLETED | OUTPATIENT
Start: 2022-03-03 | End: 2022-03-03

## 2022-03-03 RX ORDER — HALOPERIDOL 5 MG/ML
1 INJECTION INTRAMUSCULAR
Status: DISCONTINUED | OUTPATIENT
Start: 2022-03-03 | End: 2022-03-03 | Stop reason: HOSPADM

## 2022-03-03 RX ORDER — ALBUMIN HUMAN 50 G/1000ML
SOLUTION INTRAVENOUS AS NEEDED
Status: DISCONTINUED | OUTPATIENT
Start: 2022-03-03 | End: 2022-03-03 | Stop reason: HOSPADM

## 2022-03-03 RX ORDER — TRAZODONE HYDROCHLORIDE 50 MG/1
150 TABLET ORAL
Status: DISCONTINUED | OUTPATIENT
Start: 2022-03-03 | End: 2022-03-07 | Stop reason: HOSPADM

## 2022-03-03 RX ORDER — SODIUM CHLORIDE, SODIUM LACTATE, POTASSIUM CHLORIDE, CALCIUM CHLORIDE 600; 310; 30; 20 MG/100ML; MG/100ML; MG/100ML; MG/100ML
INJECTION, SOLUTION INTRAVENOUS
Status: DISCONTINUED | OUTPATIENT
Start: 2022-03-03 | End: 2022-03-03 | Stop reason: HOSPADM

## 2022-03-03 RX ORDER — HYDROMORPHONE HYDROCHLORIDE 1 MG/ML
1 INJECTION, SOLUTION INTRAMUSCULAR; INTRAVENOUS; SUBCUTANEOUS
Status: DISCONTINUED | OUTPATIENT
Start: 2022-03-03 | End: 2022-03-05

## 2022-03-03 RX ORDER — CEFAZOLIN SODIUM/WATER 2 G/20 ML
2 SYRINGE (ML) INTRAVENOUS EVERY 8 HOURS
Status: COMPLETED | OUTPATIENT
Start: 2022-03-03 | End: 2022-03-04

## 2022-03-03 RX ORDER — ESMOLOL HYDROCHLORIDE 10 MG/ML
INJECTION INTRAVENOUS AS NEEDED
Status: DISCONTINUED | OUTPATIENT
Start: 2022-03-03 | End: 2022-03-03 | Stop reason: HOSPADM

## 2022-03-03 RX ORDER — VANCOMYCIN HYDROCHLORIDE 1 G/20ML
INJECTION, POWDER, LYOPHILIZED, FOR SOLUTION INTRAVENOUS AS NEEDED
Status: DISCONTINUED | OUTPATIENT
Start: 2022-03-03 | End: 2022-03-03 | Stop reason: HOSPADM

## 2022-03-03 RX ORDER — SODIUM CHLORIDE 0.9 % (FLUSH) 0.9 %
5-40 SYRINGE (ML) INJECTION EVERY 8 HOURS
Status: DISCONTINUED | OUTPATIENT
Start: 2022-03-03 | End: 2022-03-07 | Stop reason: HOSPADM

## 2022-03-03 RX ORDER — FLUMAZENIL 0.1 MG/ML
0.2 INJECTION INTRAVENOUS
Status: DISCONTINUED | OUTPATIENT
Start: 2022-03-03 | End: 2022-03-03 | Stop reason: HOSPADM

## 2022-03-03 RX ORDER — LORAZEPAM 0.5 MG/1
1 TABLET ORAL
Status: DISCONTINUED | OUTPATIENT
Start: 2022-03-03 | End: 2022-03-07 | Stop reason: HOSPADM

## 2022-03-03 RX ORDER — SUFENTANIL CITRATE 50 UG/ML
INJECTION EPIDURAL; INTRAVENOUS
Status: DISCONTINUED | OUTPATIENT
Start: 2022-03-03 | End: 2022-03-03 | Stop reason: HOSPADM

## 2022-03-03 RX ADMIN — ESMOLOL HYDROCHLORIDE 10 MG: 10 INJECTION, SOLUTION INTRAVENOUS at 13:26

## 2022-03-03 RX ADMIN — ONDANSETRON 4 MG: 2 INJECTION INTRAMUSCULAR; INTRAVENOUS at 08:08

## 2022-03-03 RX ADMIN — POTASSIUM CHLORIDE, DEXTROSE MONOHYDRATE AND SODIUM CHLORIDE 100 ML/HR: 150; 5; 450 INJECTION, SOLUTION INTRAVENOUS at 18:10

## 2022-03-03 RX ADMIN — DEXAMETHASONE SODIUM PHOSPHATE 6 MG: 10 INJECTION INTRAMUSCULAR; INTRAVENOUS at 08:08

## 2022-03-03 RX ADMIN — ACETAMINOPHEN 1000 MG: 500 TABLET, FILM COATED ORAL at 07:29

## 2022-03-03 RX ADMIN — PROPOFOL 20 MG: 10 INJECTION, EMULSION INTRAVENOUS at 13:04

## 2022-03-03 RX ADMIN — PROPOFOL 40 MG: 10 INJECTION, EMULSION INTRAVENOUS at 08:25

## 2022-03-03 RX ADMIN — HYDROMORPHONE HYDROCHLORIDE 0.5 MG: 2 INJECTION INTRAMUSCULAR; INTRAVENOUS; SUBCUTANEOUS at 14:20

## 2022-03-03 RX ADMIN — CELECOXIB 100 MG: 100 CAPSULE ORAL at 18:05

## 2022-03-03 RX ADMIN — SODIUM CHLORIDE, SODIUM LACTATE, POTASSIUM CHLORIDE, AND CALCIUM CHLORIDE: 600; 310; 30; 20 INJECTION, SOLUTION INTRAVENOUS at 08:13

## 2022-03-03 RX ADMIN — PHENYLEPHRINE HYDROCHLORIDE 50 MCG: 10 INJECTION INTRAVENOUS at 11:15

## 2022-03-03 RX ADMIN — ALBUMIN (HUMAN) 250 ML: 12.5 INJECTION, SOLUTION INTRAVENOUS at 12:00

## 2022-03-03 RX ADMIN — KETAMINE HYDROCHLORIDE 15 MG: 50 INJECTION INTRAMUSCULAR; INTRAVENOUS at 12:15

## 2022-03-03 RX ADMIN — FENTANYL CITRATE 100 MCG: 50 INJECTION INTRAMUSCULAR; INTRAVENOUS at 08:08

## 2022-03-03 RX ADMIN — SODIUM CHLORIDE, PRESERVATIVE FREE 10 ML: 5 INJECTION INTRAVENOUS at 21:48

## 2022-03-03 RX ADMIN — ESMOLOL HYDROCHLORIDE 10 MG: 10 INJECTION, SOLUTION INTRAVENOUS at 13:14

## 2022-03-03 RX ADMIN — OXYCODONE HYDROCHLORIDE AND ACETAMINOPHEN 1 TABLET: 10; 325 TABLET ORAL at 18:23

## 2022-03-03 RX ADMIN — HYDROMORPHONE HYDROCHLORIDE 0.5 MG: 2 INJECTION INTRAMUSCULAR; INTRAVENOUS; SUBCUTANEOUS at 13:45

## 2022-03-03 RX ADMIN — PREGABALIN 75 MG: 75 CAPSULE ORAL at 21:47

## 2022-03-03 RX ADMIN — Medication 2 G: at 08:45

## 2022-03-03 RX ADMIN — PHENYLEPHRINE HYDROCHLORIDE 100 MCG: 10 INJECTION INTRAVENOUS at 12:14

## 2022-03-03 RX ADMIN — Medication 2 G: at 12:41

## 2022-03-03 RX ADMIN — KETAMINE HYDROCHLORIDE 15 MG: 50 INJECTION INTRAMUSCULAR; INTRAVENOUS at 09:15

## 2022-03-03 RX ADMIN — Medication 10 MCG/MIN: at 12:03

## 2022-03-03 RX ADMIN — Medication 1 AMPULE: at 21:47

## 2022-03-03 RX ADMIN — SODIUM CHLORIDE, SODIUM LACTATE, POTASSIUM CHLORIDE, AND CALCIUM CHLORIDE 100 ML/HR: 600; 310; 30; 20 INJECTION, SOLUTION INTRAVENOUS at 07:31

## 2022-03-03 RX ADMIN — MIDAZOLAM 2 MG: 1 INJECTION INTRAMUSCULAR; INTRAVENOUS at 08:05

## 2022-03-03 RX ADMIN — LIDOCAINE HYDROCHLORIDE 1 MG/KG/HR: 8 INJECTION, SOLUTION INTRAVENOUS at 08:16

## 2022-03-03 RX ADMIN — BUSPIRONE HYDROCHLORIDE 15 MG: 10 TABLET ORAL at 18:05

## 2022-03-03 RX ADMIN — PHENYLEPHRINE HYDROCHLORIDE 50 MCG: 10 INJECTION INTRAVENOUS at 11:29

## 2022-03-03 RX ADMIN — DOCUSATE SODIUM 100 MG: 100 CAPSULE, LIQUID FILLED ORAL at 18:03

## 2022-03-03 RX ADMIN — SODIUM CHLORIDE, SODIUM LACTATE, POTASSIUM CHLORIDE, AND CALCIUM CHLORIDE: 600; 310; 30; 20 INJECTION, SOLUTION INTRAVENOUS at 10:16

## 2022-03-03 RX ADMIN — SUCCINYLCHOLINE CHLORIDE 100 MG: 20 INJECTION, SOLUTION INTRAMUSCULAR; INTRAVENOUS at 08:08

## 2022-03-03 RX ADMIN — PHENYLEPHRINE HYDROCHLORIDE 120 MCG: 10 INJECTION INTRAVENOUS at 12:03

## 2022-03-03 RX ADMIN — SUFENTANIL CITRATE 5 MCG: 50 INJECTION EPIDURAL; INTRAVENOUS at 09:17

## 2022-03-03 RX ADMIN — HYDROMORPHONE HYDROCHLORIDE 0.5 MG: 2 INJECTION INTRAMUSCULAR; INTRAVENOUS; SUBCUTANEOUS at 14:27

## 2022-03-03 RX ADMIN — MIDAZOLAM 1 MG: 1 INJECTION INTRAMUSCULAR; INTRAVENOUS at 10:19

## 2022-03-03 RX ADMIN — ROCURONIUM BROMIDE 5 MG: 10 INJECTION, SOLUTION INTRAVENOUS at 08:08

## 2022-03-03 RX ADMIN — PROPOFOL 40 MG: 10 INJECTION, EMULSION INTRAVENOUS at 08:31

## 2022-03-03 RX ADMIN — PROPOFOL 160 MG: 10 INJECTION, EMULSION INTRAVENOUS at 08:08

## 2022-03-03 RX ADMIN — SUFENTANIL CITRATE 0.2 MCG/KG/HR: 50 INJECTION EPIDURAL; INTRAVENOUS at 08:16

## 2022-03-03 RX ADMIN — LIDOCAINE HYDROCHLORIDE 80 MG: 20 INJECTION, SOLUTION EPIDURAL; INFILTRATION; INTRACAUDAL; PERINEURAL at 08:08

## 2022-03-03 RX ADMIN — MIDAZOLAM 1 MG: 1 INJECTION INTRAMUSCULAR; INTRAVENOUS at 11:51

## 2022-03-03 RX ADMIN — HYDROMORPHONE HYDROCHLORIDE 1 MG: 1 INJECTION, SOLUTION INTRAMUSCULAR; INTRAVENOUS; SUBCUTANEOUS at 20:36

## 2022-03-03 RX ADMIN — PHENYLEPHRINE HYDROCHLORIDE 50 MCG: 10 INJECTION INTRAVENOUS at 11:23

## 2022-03-03 RX ADMIN — CEFAZOLIN 2 G: 10 INJECTION, POWDER, FOR SOLUTION INTRAVENOUS at 20:35

## 2022-03-03 RX ADMIN — OXYCODONE 5 MG: 5 TABLET ORAL at 14:10

## 2022-03-03 RX ADMIN — HYDROMORPHONE HYDROCHLORIDE 0.5 MG: 2 INJECTION INTRAMUSCULAR; INTRAVENOUS; SUBCUTANEOUS at 13:50

## 2022-03-03 RX ADMIN — KETAMINE HYDROCHLORIDE 30 MG: 50 INJECTION INTRAMUSCULAR; INTRAVENOUS at 08:08

## 2022-03-03 RX ADMIN — KETAMINE HYDROCHLORIDE 15 MG: 50 INJECTION INTRAMUSCULAR; INTRAVENOUS at 10:13

## 2022-03-03 RX ADMIN — KETAMINE HYDROCHLORIDE 15 MG: 50 INJECTION INTRAMUSCULAR; INTRAVENOUS at 11:15

## 2022-03-03 RX ADMIN — PREGABALIN 75 MG: 75 CAPSULE ORAL at 18:03

## 2022-03-03 RX ADMIN — PROPOFOL 150 MCG/KG/MIN: 10 INJECTION, EMULSION INTRAVENOUS at 08:16

## 2022-03-03 RX ADMIN — ALBUMIN (HUMAN) 250 ML: 12.5 INJECTION, SOLUTION INTRAVENOUS at 12:08

## 2022-03-03 RX ADMIN — ZOLPIDEM TARTRATE 10 MG: 5 TABLET ORAL at 22:49

## 2022-03-03 RX ADMIN — HYDROMORPHONE HYDROCHLORIDE 0.5 MG: 2 INJECTION INTRAMUSCULAR; INTRAVENOUS; SUBCUTANEOUS at 13:55

## 2022-03-03 RX ADMIN — Medication 3 AMPULE: at 07:29

## 2022-03-03 RX ADMIN — SODIUM CHLORIDE: 900 INJECTION, SOLUTION INTRAVENOUS at 10:16

## 2022-03-03 RX ADMIN — HYDROMORPHONE HYDROCHLORIDE 0.5 MG: 2 INJECTION INTRAMUSCULAR; INTRAVENOUS; SUBCUTANEOUS at 13:36

## 2022-03-03 NOTE — PROGRESS NOTES
Eugenia Reyes MD   Physician   Specialty:  Orthopedic Surgery   Progress Notes      Signed   Encounter Date:  3/03/2022                         []Hide copied text    []Dorina for details         Name: Tosha Gonzalez  YOB: 1955  Gender: female  MRN: [id number]     DATE: 3/03/2022     CC: Referral / Consult (Discuss surgery)        HPI this is a recheck on patient Tosha Gonzalez. Several years ago I did an L3-L5 laminectomy and TLIF fusion and she did well and she is also had cervical surgery by Dr. Jelly Sousa. I saw her about 7 months ago and she was complaining of back and leg pain with some radicular components and she had an MRI scan from February 23, 2021 which showed significant spinal stenosis at the L2-3 level above her prior fusion. In addition on the x-rays and MRI scan the u3d was very [place] in alignment and the back ends of the vertebral bodies were touching so it would be nearly impossible to put a reasonable sized interbody cage in place from a posterior approach. We had discussed the possibility of doing a D LIF approach for placing an interbody cage. In the meantime she has been in pain management and wanted to come back and discuss surgery. She is taking Norco 4 times a day through pain management.           PE: She is a small slight framed female who is alert and oriented x3. Her pupils are equal round reactive to light. Her neck is without adenopathy or bruit. Her lungs are clear to auscultation bilaterally and her heart is regular rate and rhythm without murmur.   Her abdomen is soft and nontender.     CURRENT MEDS:      Current Outpatient Medications:     traZODone (DESYREL) 50 mg tablet, Take 1-2 tabs po QHS PRN, Disp: 30 Tablet, Rfl: 1    azelastine (ASTELIN) 137 mcg (0.1 %) nasal spray, 1 [place] by Nasal route two (2) times a day., Disp: , Rfl:     busPIRone (BUSPAR) 15 mg tablet, , Disp: , Rfl:     celecoxib (CELEBREX) 100 mg capsule, , Disp: , Rfl:     cephALEXin (KEFLEX) 500 mg capsule, , Disp: , Rfl:     fluticasone propionate (CUTIVATE) 0.05 % topical cream, , Disp: , Rfl:     pregabalin (LYRICA) 75 mg capsule, , Disp: , Rfl:     ferrous sulfate (Iron) 325 mg (65 mg iron) tablet, Take  by mouth Daily (before breakfast). , Disp: , Rfl:     clotrimazole-betamethasone (LOTRISONE) topical cream, Apply  to affected area two (2) times a day., Disp: , Rfl:     HYDROcodone-acetaminophen (NORCO) 7.5-325 mg per tablet, Take 1 Tab by mouth every six (6) hours as needed. , Disp: , Rfl:     cyclobenzaprine (FLEXERIL) 10 mg tablet, Take 10 mg by mouth three (3) times daily. , Disp: , Rfl:     LORazepam (ATIVAN) 1 mg tablet, TAKE ONE TABLET BY MOUTH TWICE A DAY AS NEEDED FOR ANXIETY, Disp: 60 Tab, Rfl: 2    pantoprazole (PROTONIX) 40 mg tablet, TAKE ONE TABLET BY MOUTH ONE TIME DAILY, Disp: 90 Tab, Rfl: 0    zolpidem (AMBIEN) 10 mg tablet, TAKE ONE TABLET BY MOUTH AT BEDTIME, Disp: 30 Tab, Rfl: 3    medroxyPROGESTERone (PROVERA) 2.5 mg tablet, Take 1 Tab by mouth daily. , Disp: 30 Tab, Rfl: 5    amLODIPine (NORVASC) 5 mg tablet, TAKE ONE TABLET BY MOUTH ONE TIME DAILY, Disp: 90 Tab, Rfl: 3    valsartan (DIOVAN) 160 mg tablet, TAKE ONE TABLET BY MOUTH ONE TIME DAILY, Disp: 90 Tab, Rfl: 3    conjugated estrogens (PREMARIN) 1.25 mg tablet, TAKE 1 TABLET BY MOUTH DAILY, Disp: 90 Tab, Rfl: 3    buPROPion XL (WELLBUTRIN XL) 300 mg XL tablet, TAKE ONE TABLET BY MOUTH ONE TIME DAILY, Disp: 90 Tab, Rfl: 3    cholecalciferol (VITAMIN D3) 1,000 unit tablet, Take 5,000 Units by mouth daily. , Disp: , Rfl:     vitamin e (E GEMS) 1,000 unit capsule, Take 1,000 Units by mouth daily. , Disp: , Rfl:          ASSESSMENT/PLAN: I discussed the surgery in detail with the patient and her family member who was present and I went through all the risk of surgery including death, nerve injury, infection, bleeding, dural tear, heart attack or stroke, clot in the leg or lung, failure to get adequate pain relief as well as the possibility of developing new problems in the future. We talked about the hospital stay and recovery and I answered everyone's questions to satisfaction. The patient and her family member and to go home and discuss the situation if they elect to proceed on with surgery she will give us a call.   The surgery would be an L2 3D LIF followed by an L2-3 laminectomy posterior lateral fusion and instrumentation from L2-L5.     Diagnoses and all orders for this visit:     1. Spinal stenosis of lumbar region with neurogenic claudication           No orders of the defined types were placed in this encounter.        Follow-up and Dispositions  ·   Return she will call if she wants surgery.            Electronically signed by Armen Maher MD      Electronically signed by Goldie Harry MD at 03/03/22 @ 4067

## 2022-03-03 NOTE — PROGRESS NOTES
Problem: Falls - Risk of  Goal: *Absence of Falls  Description: Document German Barrientos Fall Risk and appropriate interventions in the flowsheet.   Outcome: Progressing Towards Goal  Note: Fall Risk Interventions:  Mobility Interventions: Bed/chair exit alarm,Communicate number of staff needed for ambulation/transfer         Medication Interventions: Bed/chair exit alarm,Patient to call before getting OOB    Elimination Interventions: Bed/chair exit alarm,Call light in reach    History of Falls Interventions: Bed/chair exit alarm         Problem: Pain  Goal: *Control of Pain  Outcome: Progressing Towards Goal

## 2022-03-03 NOTE — PERIOP NOTES
When wiping pt perineal/vaginal area for dias placement, old bloody mucous  cleaned from vaginal opening.

## 2022-03-03 NOTE — ANESTHESIA POSTPROCEDURE EVALUATION
Procedure(s):  L2-L3 DLIF  L2-L3 LAMI FUSION/ L2-L5 REVISION INSTRUMENTATION, REDO RIGHT L3-4 LAMI/DISC. general, total IV anesthesia    Anesthesia Post Evaluation      Multimodal analgesia: multimodal analgesia used between 6 hours prior to anesthesia start to PACU discharge  Patient location during evaluation: bedside  Patient participation: complete - patient participated  Level of consciousness: awake  Pain management: adequate  Airway patency: patent  Anesthetic complications: no  Cardiovascular status: acceptable  Respiratory status: spontaneous ventilation and acceptable  Hydration status: acceptable  Post anesthesia nausea and vomiting:  none      INITIAL Post-op Vital signs:   Vitals Value Taken Time   /59 03/03/22 1536   Temp 36.7 °C (98 °F) 03/03/22 1456   Pulse 96 03/03/22 1536   Resp 18 03/03/22 1536   SpO2 99 % 03/03/22 1536   Vitals shown include unvalidated device data.

## 2022-03-03 NOTE — ANESTHESIA PROCEDURE NOTES
Arterial Line Placement    Start time: 3/3/2022 8:20 AM  End time: 3/3/2022 8:24 AM  Performed by: Lupillo Laguna MD  Authorized by: Lupillo Laguna MD     Pre-Procedure  Indications:  Arterial pressure monitoring and blood sampling  Preanesthetic Checklist: patient identified, risks and benefits discussed, anesthesia consent, site marked, patient being monitored, timeout performed and patient being monitored    Timeout Time: 08:20 EST        Procedure:   Prep:  ChloraPrep  Seldinger Technique?: Yes    Orientation:  Left  Location:  Radial artery  Catheter size:  20 G  Number of attempts:  2  Cont Cardiac Output Sensor: No      Assessment:   Post-procedure:  Line secured and sterile dressing applied  Patient Tolerance:  Patient tolerated the procedure well with no immediate complications

## 2022-03-03 NOTE — PROGRESS NOTES
Spiritual Care visit. Initial Visit, Pre Surgery Consult. Visit and prayer before patient goes to surgery.     Visit by Maggy Proctor M.Ed., Th.B. ,Staff

## 2022-03-03 NOTE — PROGRESS NOTES
03/03/22 1604   Dual Skin Pressure Injury Assessment   Dual Skin Pressure Injury Assessment WDL   Second Care Provider (Based on [address] [place] [address]) Ella Monsalve RN

## 2022-03-03 NOTE — ANESTHESIA PREPROCEDURE EVALUATION
Anesthetic History   No history of anesthetic complications            Review of Systems / Medical History  Patient summary reviewed and pertinent labs reviewed    Pulmonary  Within defined limits                 Neuro/Psych         Psychiatric history (Depression)    Comments: Right hand and arm weakness Cardiovascular    Hypertension: well controlled              Exercise tolerance: >4 METS     GI/Hepatic/Renal     GERD: well controlled           Endo/Other        Arthritis     Other Findings   Comments: Chronic pain - on chronic opioids           Physical Exam    Airway  Mallampati: II  TM Distance: 4 - 6 cm  Neck ROM: decreased range of motion   Mouth opening: Normal     Cardiovascular    Rhythm: regular           Dental  No notable dental hx       Pulmonary  Breath sounds clear to auscultation               Abdominal  GI exam deferred       Other Findings            Anesthetic Plan    ASA: 2  Anesthesia type: general and total IV anesthesia    Monitoring Plan: Arterial line      Induction: Intravenous  Anesthetic plan and risks discussed with: Patient      Initial TIVA for neuromonitoring

## 2022-03-03 NOTE — PERIOP NOTES
TRANSFER - OUT REPORT:    Verbal report given to Ashlyn Sheppard RN on Vickie Elizabeth  being transferred to  for routine post - op       Report consisted of patients Situation, Background, Assessment and   Recommendations(SBAR). Information from the following report(s) SBAR, OR Summary, MAR and Cardiac Rhythm nsr was reviewed with the receiving nurse. Lines:   Peripheral IV 03/03/22 Posterior;Right Hand (Active)   Site Assessment Clean, dry, & intact 03/03/22 1333   Phlebitis Assessment 0 03/03/22 1333   Infiltration Assessment 0 03/03/22 1333   Dressing Status Clean, dry, & intact 03/03/22 1333   Dressing Type Tape;Transparent 03/03/22 1333   Hub Color/Line Status Patent 03/03/22 1333       Peripheral IV 03/03/22 Left Forearm (Active)   Site Assessment Clean, dry, & intact 03/03/22 1333   Phlebitis Assessment 0 03/03/22 1333   Infiltration Assessment 0 03/03/22 1333   Dressing Status Clean, dry, & intact 03/03/22 1333   Dressing Type Tape;Transparent 03/03/22 1333   Hub Color/Line Status Patent 03/03/22 1333        Opportunity for questions and clarification was provided. Patient transported with:   O2 @ 3 liters  Tech    VTE prophylaxis orders have been written for Vickie Elizabeth. Patient and family given floor number and nurses name. Family updated re: pt status after security code verified.

## 2022-03-03 NOTE — BRIEF OP NOTE
Brief Postoperative Note    Patient: Cris English  YOB: 1955  MRN: [id number]    Date of Procedure: 3/3/2022     Pre-Op Diagnosis: Lumbar stenosis with neurogenic claudication [M48.062]    Post-Op Diagnosis: same with spondylolisthesis     Procedure(s):  L2-L3 DLIF  L2-L3 LAMI FUSION/ L2-L5 REVISION INSTRUMENTATION, REDO RIGHT L3-4 LAMI/DISC    Surgeon(s):  Emelia Doll MD    Surgical Assistant: None    Anesthesia: General     Estimated Blood Loss (mL): 565TO    Complications: none    Specimens: * No specimens in log *     Implants:   Implant Name Type Inv.  Item Serial No.  Lot No. LRB No. Used Action   5.0cc ProteiOS growth factor    [id number] BIOLOGICA  N/A 1 Implanted   SERVICE FEE 10ML BIO DBM PTTY W CHIP - [id number]  SERVICE FEE 10ML BIO DBM PTTY W CHIP  [employer] [id number] N/A 1 Implanted   SERVICE FEE 10ML BIO DBM PTTY W CHIP - [id number]  SERVICE FEE 10ML BIO DBM PTTY W CHIP  [employer] [id number] N/A 1 Implanted   PLATE SPNL 1 CENTER SCREW 4X15 MM LAT INTBDY FUSION AMP - [id number]  PLATE SPNL 1 CENTER SCREW 4X15 MM LAT INTBDY FUSION AMP  [employer]PHAT[employer] SPINE_WD [id number] N/A 1 Implanted   SPACER SPNL 10 DEG 14E03F2 MM LIF POROUS TI IDENTITI - [id number]  SPACER SPNL 10 DEG 05P89I1 MM LIF POROUS TI IDENTITI  [employer]PH[employer] SPINE_WD [id number] N/A 1 Implanted   SCREW SPNL 5.5X45 MM LAT INTBDY FUSION X2 AMP - [id number]  SCREW SPNL 5.5X45 MM LAT INTBDY FUSION X2 AMP  [employer]PH[employer] SPINE[employer]WD [id number] N/A 1 Implanted   BLOCKER SPNL CT CHARLES 45 - [id number]  BLOCKER SPNL CT CHARLES 45  SABINE SPINE HOWM_WD [id number] N/A 8 Implanted   SCREW SPNL L20MM OD55MM POLYAX LOPEZ CT CHARLES - [id number]  SCREW SPNL L20MM OD55MM POLYAX LOPEZ CT [facility] SPINE HOWM_WD [id number] N/A 2 Implanted   MALIK SPINE PREBENT W HEX VITALIUM [id number] [facility] [id number]  MALIK SPINE PREBENT W HEX VITALIUM [id number] [facility] SPINE HOWM_WD [id number] N/A 2 Implanted       Drains:   Hemovac Lower Back (Active)       [REMOVED] Greg-Mason Drain 10/17/18 Anterior; Left Neck (Removed)       [REMOVED] Greg-Mason Drain 01/07/19 Back (Removed)       [REMOVED] Hemovac Back (Removed)       [REMOVED] Hemovac Back (Removed)       Findings: stenosis    Electronically Signed by Luis Connell MD on 3/3/2022 at 1:01 PM

## 2022-03-04 LAB
ANION GAP SERPL CALC-SCNC: 5 MMOL/L (ref 7–16)
BUN SERPL-MCNC: 4 MG/DL (ref 8–23)
CALCIUM SERPL-MCNC: 8 MG/DL (ref 8.3–10.4)
CHLORIDE SERPL-SCNC: 108 MMOL/L (ref 98–107)
CO2 SERPL-SCNC: 27 MMOL/L (ref 21–32)
CREAT SERPL-MCNC: 0.6 MG/DL (ref 0.6–1)
ERYTHROCYTE [DISTWIDTH] IN BLOOD BY AUTOMATED COUNT: 12.4 % (ref 11.9–14.6)
GLUCOSE SERPL-MCNC: 100 MG/DL (ref 65–100)
HCT VFR BLD AUTO: 22.6 % (ref 35.8–46.3)
HCT VFR BLD AUTO: 22.9 % (ref 35.8–46.3)
HGB BLD-MCNC: 7.4 G/DL (ref 11.7–15.4)
HGB BLD-MCNC: 7.4 G/DL (ref 11.7–15.4)
MCH RBC QN AUTO: 30.8 PG (ref 26.1–32.9)
MCHC RBC AUTO-ENTMCNC: 32.7 G/DL (ref 31.4–35)
MCV RBC AUTO: 94.2 FL (ref 79.6–97.8)
NRBC # BLD: 0 K/UL (ref 0–0.2)
PLATELET # BLD AUTO: 219 K/UL (ref 150–450)
PMV BLD AUTO: 10.2 FL (ref 9.4–12.3)
POTASSIUM SERPL-SCNC: 4 MMOL/L (ref 3.5–5.1)
RBC # BLD AUTO: 2.4 M/UL (ref 4.05–5.2)
SODIUM SERPL-SCNC: 140 MMOL/L (ref 136–145)
WBC # BLD AUTO: 9.9 K/UL (ref 4.3–11.1)

## 2022-03-04 PROCEDURE — 80048 BASIC METABOLIC PNL TOTAL CA: CPT

## 2022-03-04 PROCEDURE — 74011250637 HC RX REV CODE- 250/637: Performed by: ORTHOPAEDIC SURGERY

## 2022-03-04 PROCEDURE — 85027 COMPLETE CBC AUTOMATED: CPT

## 2022-03-04 PROCEDURE — 65270000029 HC RM PRIVATE

## 2022-03-04 PROCEDURE — 97530 THERAPEUTIC ACTIVITIES: CPT

## 2022-03-04 PROCEDURE — 97161 PT EVAL LOW COMPLEX 20 MIN: CPT

## 2022-03-04 PROCEDURE — 85018 HEMOGLOBIN: CPT

## 2022-03-04 PROCEDURE — 74011250636 HC RX REV CODE- 250/636: Performed by: ORTHOPAEDIC SURGERY

## 2022-03-04 PROCEDURE — 74011000250 HC RX REV CODE- 250: Performed by: ORTHOPAEDIC SURGERY

## 2022-03-04 PROCEDURE — 36415 COLL VENOUS BLD VENIPUNCTURE: CPT

## 2022-03-04 RX ADMIN — POTASSIUM CHLORIDE, DEXTROSE MONOHYDRATE AND SODIUM CHLORIDE 100 ML/HR: 150; 5; 450 INJECTION, SOLUTION INTRAVENOUS at 06:10

## 2022-03-04 RX ADMIN — HYDROMORPHONE HYDROCHLORIDE 1 MG: 1 INJECTION, SOLUTION INTRAMUSCULAR; INTRAVENOUS; SUBCUTANEOUS at 20:07

## 2022-03-04 RX ADMIN — DOCUSATE SODIUM 100 MG: 100 CAPSULE, LIQUID FILLED ORAL at 08:02

## 2022-03-04 RX ADMIN — BUPROPION HYDROCHLORIDE 150 MG: 150 TABLET, EXTENDED RELEASE ORAL at 08:04

## 2022-03-04 RX ADMIN — HYDROXYZINE PAMOATE 100 MG: 50 CAPSULE ORAL at 17:34

## 2022-03-04 RX ADMIN — MEDROXYPROGESTERONE ACETATE 5 MG: 10 TABLET ORAL at 08:01

## 2022-03-04 RX ADMIN — SODIUM CHLORIDE, PRESERVATIVE FREE 10 ML: 5 INJECTION INTRAVENOUS at 21:31

## 2022-03-04 RX ADMIN — PANTOPRAZOLE SODIUM 40 MG: 40 TABLET, DELAYED RELEASE ORAL at 08:04

## 2022-03-04 RX ADMIN — PREGABALIN 75 MG: 75 CAPSULE ORAL at 16:05

## 2022-03-04 RX ADMIN — PREGABALIN 75 MG: 75 CAPSULE ORAL at 21:31

## 2022-03-04 RX ADMIN — Medication 1 AMPULE: at 08:01

## 2022-03-04 RX ADMIN — CEFAZOLIN 2 G: 10 INJECTION, POWDER, FOR SOLUTION INTRAVENOUS at 14:06

## 2022-03-04 RX ADMIN — DOCUSATE SODIUM 100 MG: 100 CAPSULE, LIQUID FILLED ORAL at 17:34

## 2022-03-04 RX ADMIN — POTASSIUM CHLORIDE, DEXTROSE MONOHYDRATE AND SODIUM CHLORIDE 100 ML/HR: 150; 5; 450 INJECTION, SOLUTION INTRAVENOUS at 20:58

## 2022-03-04 RX ADMIN — VALSARTAN 160 MG: 80 TABLET ORAL at 08:04

## 2022-03-04 RX ADMIN — CYCLOBENZAPRINE 10 MG: 10 TABLET, FILM COATED ORAL at 14:04

## 2022-03-04 RX ADMIN — CELECOXIB 100 MG: 100 CAPSULE ORAL at 17:35

## 2022-03-04 RX ADMIN — Medication 1 AMPULE: at 21:31

## 2022-03-04 RX ADMIN — BUSPIRONE HYDROCHLORIDE 15 MG: 10 TABLET ORAL at 08:04

## 2022-03-04 RX ADMIN — BUPROPION HYDROCHLORIDE 150 MG: 150 TABLET, EXTENDED RELEASE ORAL at 17:34

## 2022-03-04 RX ADMIN — HYDROMORPHONE HYDROCHLORIDE 1 MG: 1 INJECTION, SOLUTION INTRAMUSCULAR; INTRAVENOUS; SUBCUTANEOUS at 02:36

## 2022-03-04 RX ADMIN — HYDROMORPHONE HYDROCHLORIDE 1 MG: 1 INJECTION, SOLUTION INTRAMUSCULAR; INTRAVENOUS; SUBCUTANEOUS at 07:57

## 2022-03-04 RX ADMIN — HYDROMORPHONE HYDROCHLORIDE 1 MG: 1 INJECTION, SOLUTION INTRAMUSCULAR; INTRAVENOUS; SUBCUTANEOUS at 16:05

## 2022-03-04 RX ADMIN — CEFAZOLIN 2 G: 10 INJECTION, POWDER, FOR SOLUTION INTRAVENOUS at 06:10

## 2022-03-04 RX ADMIN — PREGABALIN 75 MG: 75 CAPSULE ORAL at 08:04

## 2022-03-04 RX ADMIN — AMLODIPINE BESYLATE 5 MG: 5 TABLET ORAL at 08:01

## 2022-03-04 RX ADMIN — LORAZEPAM 1 MG: 0.5 TABLET ORAL at 14:41

## 2022-03-04 RX ADMIN — HYDROMORPHONE HYDROCHLORIDE 1 MG: 1 INJECTION, SOLUTION INTRAMUSCULAR; INTRAVENOUS; SUBCUTANEOUS at 12:01

## 2022-03-04 RX ADMIN — CELECOXIB 100 MG: 100 CAPSULE ORAL at 08:04

## 2022-03-04 RX ADMIN — BUSPIRONE HYDROCHLORIDE 15 MG: 10 TABLET ORAL at 17:34

## 2022-03-04 RX ADMIN — ESTROGENS, CONJUGATED 1.25 MG: 0.62 TABLET, FILM COATED ORAL at 10:47

## 2022-03-04 RX ADMIN — SODIUM CHLORIDE, PRESERVATIVE FREE 10 ML: 5 INJECTION INTRAVENOUS at 06:10

## 2022-03-04 NOTE — PROGRESS NOTES
ACUTE PHYSICAL THERAPY GOALS:  (Developed with and agreed upon by patient and/or caregiver. )  LTG:  (1.)Ms. Linda Monet will move from supine to sit and sit to supine, scoot up and down and roll side to side in bed with INDEPENDENCE via log roll within 7 treatment day(s). (2.)Ms. Linda Monet will transfer from bed to chair and chair to bed with MODIFIED INDEPENDENCE using the least restrictive device within 7 treatment day(s). (3.)Ms. Linda Monet will ambulate with SUPERVISION for 300+ feet with the least restrictive device within 7 treatment day(s). (4.)Ms. Linda Monet will independently verbalize 3/3 post op spinal precautions and maintain compliance within 7 days.   ________________________________________________________________________________________________      PHYSICAL THERAPY ASSESSMENT: Initial Assessment and AM PT Treatment Day # 1      Hiral Locke is a 74 y.o. female   PRIMARY DIAGNOSIS: <principal problem not specified>  Lumbar stenosis with neurogenic claudication [M48.062]  Spinal stenosis [M48.00]  Procedure(s) (LRB):  L2-L3 DLIF (N/A)  L2-L3 LAMI FUSION/ L2-L5 REVISION INSTRUMENTATION, REDO RIGHT L3-4 LAMI/DISC (N/A)  1 Day Post-Op  Reason for Referral:  Mobility following above surgery  ICD-10: Treatment Diagnosis: Generalized Muscle Weakness (M62.81)  Difficulty in walking, Not elsewhere classified (R26.2)  Other abnormalities of gait and mobility (R26.89)  [place]PAT[place]: [place] / [place] B / Product Type: Medicare /     ASSESSMENT:     REHAB RECOMMENDATIONS:   Recommendation to date pending progress:  [address] [place] [address]  Equipment:    To Be Determined     PRIOR LEVEL OF FUNCTION:  (Prior to Hospitalization) INITIAL/CURRENT LEVEL OF FUNCTION:  (Most Recently Demonstrated)   Bed Mobility:   Independent  Sit to Stand:   Independent  Transfers:   Independent  Gait/Mobility:   Independent Bed Mobility:   Minimal Assistance  Sit to Stand:   Minimal Assistance-moderate assistance  Transfers:   Minimal Assistance-moderate assistance  Gait/Mobility:   Minimal Assistance-moderate assistance     ASSESSMENT:  Ms. Cassy Salazar is seen for initial therapy assessment 1 day post op above surgery. She lives alone and is independent at baseline without DME use. Presents today with expected post op pain and soreness and feels generally weak in lower extremities. Reviewed log roll technique- pt transfers to sitting with min assist and additional time. C/o lightheadedness, BP stable. Min-mod handheld assist for sit-stand transfer. BP recheck in standing and remains stable. Ambulation 10 ft to bathroom with min-mod HHA and slightly unsteady/shaky gait. Pt noted to have some vaginal bleeding in brief and when sitting on commode and states she has experienced this in the past as well (has seen her obgyn)- RN notified. Practiced functional transfers, standing activities, and seated activities while reviewing spinal precautions. Upon sitting in recliner, pt looking a little more pale and /62 which was a significant drop compared to earlier in session. SpO2 99% on RA. Dc needs TBD pending progress- does have friend who plans to stay with her for 2 weeks and can hopefully return home with [facility] PT.      SUBJECTIVE:   Ms. Cassy Salazar states, \"I'm pale\"    SOCIAL HISTORY/LIVING ENVIRONMENT: Lives alone. Independent at baseline without DME use.     Home Environment: Private residence  # Steps to Enter: 2  One/Two Story Residence: Two story, live on 1st floor  Living Alone: Yes  Support Systems: Other Family Member(s),Friend/Neighbor  OBJECTIVE:     PAIN: VITAL SIGNS: LINES/DRAINS:   Pre Treatment: Pain Screen  Pain Scale 1: Numeric (0 - 10)  Pain Intensity 1: 3  Pain Onset 1: post op  Pain Location 1: Back  Pain Intervention(s) 1: Repositioned  Post Treatment: 0/10 Vital Signs  O2 Device: None (Room air) Hemovac, IV and Purewick  O2 Device: None (Room air)     GROSS EVALUATION:   Within Functional Limits Abnormal/ Functional Abnormal/ Non-Functional (see comments) Not Tested Comments:   AROM [x] [] [] []    PROM [] [] [] []    Strength [] [x] [] []    Balance [] [x] [] []    Posture [] [x] [] []    Sensation [] [] [] []    Coordination [] [] [] []    Tone [] [] [] []    Edema [] [] [] []    Activity Tolerance [] [x] [] []     [] [] [] []      COGNITION/  PERCEPTION: Intact Impaired   (see comments) Comments:   Orientation [x] []    Vision [x] []    Hearing [x] []    Command Following [x] []    Safety Awareness [] [x]     [] []      MOBILITY: I Mod I S SBA CGA Min Mod Max Total  NT x2 Comments:   Bed Mobility    Rolling [] [] [] [] [x] [] [] [] [] [] []    Supine to Sit [] [] [] [] [x] [x] [] [] [] [] []    Scooting [] [] [] [] [x] [] [] [] [] [] []    Sit to Supine [] [] [] [] [] [] [] [] [] [] []    Transfers    Sit to Stand [] [] [] [] [] [x] [x] [] [] [] []    Bed to Chair [] [] [] [] [] [x] [x] [] [] [] []    Stand to Sit [] [] [] [] [] [x] [x] [] [] [] []    I=Independent, Mod I=Modified Independent, S=Supervision, SBA=Standby Assistance, CGA=Contact Guard Assistance,   Min=Minimal Assistance, Mod=Moderate Assistance, Max=Maximal Assistance, Total=Total Assistance, NT=Not Tested  GAIT: I Mod I S SBA CGA Min Mod Max Total  NT x2 Comments:   Level of Assistance [] [] [] [] [] [x] [x] [] [] [] []    Distance 10 ft x 2    DME None (handheld assist)    Gait Quality Weak, unsteady    Weightbearing Status N/A     I=Independent, Mod I=Modified Independent, S=Supervision, SBA=Standby Assistance, CGA=Contact Guard Assistance,   Min=Minimal Assistance, Mod=Moderate Assistance, Max=Maximal Assistance, Total=Total Assistance, NT=Not Tested    [facility] [address] [id number] AM-PAC 6 Clicks   Basic Mobility Inpatient Short Form       How much difficulty does the patient currently have. .. Unable A Lot A Little None   1. Turning over in bed (including adjusting bedclothes, sheets and blankets)?    [] 1   [] 2   [x] 3 [] 4   2. Sitting down on and standing up from a chair with arms ( e.g., wheelchair, bedside commode, etc.)   [] 1   [] 2   [x] 3   [] 4   3. Moving from lying on back to sitting on the side of the bed? [] 1   [] 2   [x] 3   [] 4   How much help from another person does the patient currently need. .. Total A Lot A Little None   4. Moving to and from a bed to a chair (including a wheelchair)? [] 1   [] 2   [x] 3   [] 4   5. Need to walk in hospital room? [] 1   [x] 2   [] 3   [] 4   6. Climbing 3-5 steps with a railing? [] 1   [x] 2   [] 3   [] 4   © 2007, Trustees of [address] [place] [address] [address] [address], under license to [employer]. All rights reserved     Score:  Initial: 16 Most Recent: X (Date: -- )    Interpretation of Tool:  Represents activities that are increasingly more difficult (i.e. Bed mobility, Transfers, Gait). PLAN:   FREQUENCY/DURATION: PT Plan of Care: BID for duration of hospital stay or until stated goals are met, whichever comes first.    PROBLEM LIST:   (Skilled intervention is medically necessary to address:)  1. Decreased Activity Tolerance  2. Decreased Balance  3. Decreased Gait Ability  4. Decreased Strength  5. Decreased Transfer Abilities  6. Increased Pain   INTERVENTIONS PLANNED:   (Benefits and precautions of physical therapy have been discussed with the patient.)  1. Therapeutic Activity  2. Therapeutic Exercise/HEP  3. Neuromuscular Re-education  4. Gait Training  5. Manual Therapy  6. Education     TREATMENT:     EVALUATION: Low Complexity : (Untimed Charge)    TREATMENT:   ($$ Therapeutic Activity: 23-37 mins    )  Therapeutic Activity (23 Minutes):  Therapeutic activity included Rolling, Supine to Sit, Scooting, Transfer Training, Ambulation on level ground, Sitting balance , Standing balance and toilet transfers, seated and standing functional activities to improve functional Mobility, Strength, Activity tolerance and balance, and to address correct log roll technique, spinal precautions.     TREATMENT GRID:  N/A    AFTER TREATMENT POSITION/PRECAUTIONS:  Chair, Needs within reach and RN notified    INTERDISCIPLINARY COLLABORATION:  RN/PCT and PT/PTA    TOTAL TREATMENT DURATION:  PT Patient Time In/Time Out  Time In: 0845  Time Out: Modesta Kidd 42, DPT

## 2022-03-04 NOTE — PROGRESS NOTES
Problem: Falls - Risk of  Goal: *Absence of Falls  Description: Document Key Barnes Fall Risk and appropriate interventions in the flowsheet.   Outcome: Progressing Towards Goal  Note: Fall Risk Interventions:  Mobility Interventions: Bed/chair exit alarm         Medication Interventions: Bed/chair exit alarm    Elimination Interventions: Bed/chair exit alarm    History of Falls Interventions: Bed/chair exit alarm         Problem: Patient Education: Go to Patient Education Activity  Goal: Patient/Family Education  Outcome: Progressing Towards Goal     Problem: Pain  Goal: *Control of Pain  Outcome: Progressing Towards Goal  Goal: *PALLIATIVE CARE:  Alleviation of Pain  Outcome: Progressing Towards Goal

## 2022-03-04 NOTE — PROGRESS NOTES
ACUTE PHYSICAL THERAPY GOALS:  (Developed with and agreed upon by patient and/or caregiver. )  LTG:  (1.)Ms. Rowan Link will move from supine to sit and sit to supine, scoot up and down and roll side to side in bed with INDEPENDENCE via log roll within 7 treatment day(s). (2.)Ms. Rowan Link will transfer from bed to chair and chair to bed with MODIFIED INDEPENDENCE using the least restrictive device within 7 treatment day(s). (3.)Ms. Rowan Link will ambulate with SUPERVISION for 300+ feet with the least restrictive device within 7 treatment day(s). (4.)Ms. Rowan Link will independently verbalize 3/3 post op spinal precautions and maintain compliance within 7 days. PHYSICAL THERAPY: Daily Note and PM Treatment Day # 1    Jose M Enrique is a 74 y.o. female   PRIMARY DIAGNOSIS: <principal problem not specified>  Lumbar stenosis with neurogenic claudication [M48.062]  Spinal stenosis [M48.00]  Procedure(s) (LRB):  L2-L3 DLIF (N/A)  L2-L3 LAMI FUSION/ L2-L5 REVISION INSTRUMENTATION, REDO RIGHT L3-4 LAMI/DISC (N/A)  1 Day Post-Op    ASSESSMENT:     REHAB RECOMMENDATIONS: CURRENT LEVEL OF FUNCTION:  (Most Recently Demonstrated)   Recommendation to date pending progress:  [address] [place] [address] Therapy  Equipment:    None Bed Mobility:   Contact Guard Assistance  Sit to Stand:   Contact Guard Assistance  Transfers:   Contact Guard Assistance  Gait/Mobility:   Contact Guard Assistance-stand by assistance     ASSESSMENT:  Ms. Rowan Link is seen for initial therapy assessment 1 day post op above surgery. She lives alone and is independent at baseline without DME use. Presents today with expected post op pain and soreness and feels generally weak in lower extremities. Reviewed log roll technique- pt transfers to sitting with min assist and additional time. C/o lightheadedness, BP stable. Min-mod handheld assist for sit-stand transfer. BP recheck in standing and remains stable.  Ambulation 10 ft to bathroom with min-mod HHA and slightly unsteady/shaky gait. Pt noted to have some vaginal bleeding in brief and when sitting on commode and states she has experienced this in the past as well (has seen her obgyn)- RN notified. Practiced functional transfers, standing activities, and seated activities while reviewing spinal precautions. Upon sitting in recliner, pt looking a little more pale and /62 which was a significant drop compared to earlier in session. SpO2 99% on RA. Dc needs TBD pending progress- does have friend who plans to stay with her for 2 weeks and can hopefully return home with Loc[facility] within [facility] PT. PM- pt performs all mobility with additional time and cueing. BP monitored during position changes and is stable. Practiced log roll, seated activities, and transfers to RW. Ambulation x 500 ft with very slow, cautious gait and uncontrolled pain throughout session (RN notified). Much improved overall compared to the morning, still having pain control issues but moving better and less dizzy/lightheaded this afternoon. Will continue with efforts      SUBJECTIVE:   Ms. Nazanin Mora states, \"this pain is awful, I don't think that last medication helped at all\"    SOCIAL HISTORY/ LIVING ENVIRONMENT: Lives alone. Independent at baseline without DME use.   Home Environment: Private residence  # Steps to Enter: 2  One/Two Story Residence: Two story, live on 1st floor  Living Alone: Yes  Support Systems: Other Family Member(s),Friend/Neighbor  OBJECTIVE:     PAIN: VITAL SIGNS: LINES/DRAINS:   Pre Treatment: Pain Screen  Pain Scale 1: Numeric (0 - 10)  Pain Intensity 1: 0  Pain Onset 1: post op  Pain Location 1: Back  Pain Intervention(s) 1: Repositioned  Post Treatment: 5/10 (RN notified) Vital Signs  O2 Device: None (Room air) Hemovac and IV  O2 Device: None (Room air)     MOBILITY: I Mod I S SBA CGA Min Mod Max Total  NT x2 Comments:   Bed Mobility    Rolling [] [] [] [] [x] [] [] [] [] [] []    Supine to Sit [] [] [] [] [x] [] [] [] [] [] [] Scooting [] [] [] [] [x] [] [] [] [] [] []    Sit to Supine [] [] [] [] [x] [] [] [] [] [] []    Transfers    Sit to Stand [] [] [] [] [x] [] [] [] [] [] []    Bed to Chair [] [] [] [] [] [] [] [] [] [] []    Stand to Sit [] [] [] [] [x] [] [] [] [] [] []    I=Independent, Mod I=Modified Independent, S=Supervision, SBA=Standby Assistance, CGA=Contact Guard Assistance,   Min=Minimal Assistance, Mod=Moderate Assistance, Max=Maximal Assistance, Total=Total Assistance, NT=Not Tested    BALANCE: Good Fair+ Fair Fair- Poor NT Comments   Sitting Static [x] [] [] [] [] []    Sitting Dynamic [x] [] [] [] [] []              Standing Static [] [x] [x] [] [] []    Standing Dynamic [] [] [x] [] [] []      GAIT: I Mod I S SBA CGA Min Mod Max Total  NT x2 Comments:   Level of Assistance [] [] [] [x] [x] [] [] [] [] [] []    Distance 500 ft    DME Rolling Walker    Gait Quality Slow, cautious    Weightbearing  Status N/A     I=Independent, Mod I=Modified Independent, S=Supervision, SBA=Standby Assistance, CGA=Contact Guard Assistance,   Min=Minimal Assistance, Mod=Moderate Assistance, Max=Maximal Assistance, Total=Total Assistance, NT=Not Tested    PLAN:   FREQUENCY/DURATION: PT Plan of Care: BID for duration of hospital stay or until stated goals are met, whichever comes first.  TREATMENT:     TREATMENT:   ($$ Therapeutic Activity: 38-52 mins    )  Therapeutic Activity (38 Minutes): Therapeutic activity included Rolling, Supine to Sit, Sit to Supine, Scooting, Transfer Training, Ambulation on level ground, Sitting balance , Standing balance and review of log roll technique, transfer technique, body mechanics to improve functional Mobility, Strength, Activity tolerance and balance.     TREATMENT GRID:  N/A    AFTER TREATMENT POSITION/PRECAUTIONS:  Bed, Needs within reach, RN notified and Visitors at bedside    INTERDISCIPLINARY COLLABORATION:  RN/PCT and PT/PTA    TOTAL TREATMENT DURATION:  PT Patient Time In/Time Out  Time In: 1315  Time Out: [facility] JAEL Ashley

## 2022-03-04 NOTE — PROGRESS NOTES
Received call that patient had vaginal bleeding. This is not abnormal for her.   If it continues I would recommend consulting hospitalist.    Will recheck hgb at 1500

## 2022-03-04 NOTE — PROGRESS NOTES
ORTHO PROGRESS NOTE    2022    Admit Date: 3/3/2022  Admit Diagnosis: Lumbar stenosis with neurogenic claudication [M48.062]  Spinal stenosis [M48.00]  Post Op day: 1 Day Post-Op      Subjective:     Fadi Knox is a patient who is now 1 Day Post-Op  and has complaints of pain. States uncontrolled. On Norco 10 preop. .       Objective:     PT/OT: to progress       Vital Signs:    Patient Vitals for the past 8 hrs:   BP Temp Pulse Resp SpO2   22 0800 (!) 148/64 98.7 °F (37.1 °C) 82 16 97 %   22 0308 130/65 98.5 °F (36.9 °C) 85 17 100 %     Temp (24hrs), Av.1 °F (36.7 °C), Min:97.2 °F (36.2 °C), Max:98.7 °F (37.1 °C)      LAB:    Recent Labs     22  0502   HGB 7.4*   WBC 9.9          I/O:  No intake/output data recorded.  1901 -  0700  In: 3000 [I.V.:3000]  Out: 5100 [Urine:3650]    Physical Exam:    Awake and in no acute distress. Mood and affect appropriate. Respirations unlabored and no evidence cyanosis. Calves nontender. Abdomen soft and nontender. Dressing clean/dry  No new neurologic deficit.     Assessment:      Patient Active Problem List   Diagnosis Code    Hypertension I10    Fatigue R53.83    Gastro-esophageal reflux disease with esophagitis K21.00    Neck pain M54.2    Sinus congestion R09.81    Microhematuria R31.29    B12 deficiency E53.8    Renal cyst, congenital, right Q61.00    Depression with anxiety F41.8    Hx of fracture of pelvis Z87.81    Back pain M54.9    Post-viral cough syndrome R05.8    Chronic pelvic pain in female R10.2, G89.29    Chronic narcotic use F11.90    Other chronic pain G89.29    Cervical spinal stenosis M48.02    Cervical radiculopathy at C5 M54.12    Spinal stenosis of lumbar region at multiple levels M48.061    Acute blood loss as cause of postoperative anemia D62    DDD (degenerative disc disease), lumbar M51.36    Numbness and tingling R20.0, R20.2    Pain in both hands M79.641, M79.642    Neuropathy G62.9    Weakness of both legs R29.898    Postural imbalance R29.3    Numbness and tingling of both feet R20.0, R20.2    Spondylolisthesis at L2-L3 level M43.16    Spinal stenosis M48.00       1 Day Post-Op STATUS POST Procedure(s):  L2-L3 DLIF  L2-L3 LAMI FUSION/ L2-L5 REVISION INSTRUMENTATION, REDO RIGHT L3-4 LAMI/DISC      Plan:     Continue PT/OT/Rehab  Discontinue:    Consult:   Anticipate discharge to: HOME with [place] when cleared with PT  Pain control- will most likely be an issue. Will adjust medications appropriately.   Trend hgb- BP stable       Signed By: Janee Gardner NP

## 2022-03-04 NOTE — OP NOTES
[address] [place] [address]  OPERATIVE REPORT    Name:  Lali Isaacs  MR#:  [id number]  :  1955  ACCOUNT #:  [de-identified]  DATE OF SERVICE:  2022    PREOPERATIVE DIAGNOSES:  L2-3 spinal stenosis with spondylolisthesis and prior L3 to L5 instrumented fusion and right-sided L3-4 foraminal stenosis disc herniation. POSTOPERATIVE DIAGNOSES:  L2-3 spinal stenosis with spondylolisthesis and prior L3 to L5 instrumented fusion and right-sided L3-4 foraminal stenosis disc herniation. PROCEDURES PERFORMED:  1. Redo right-sided L3-4 laminectomy and diskectomy, CPT code 63695.  2.  Bilateral L2 laminectomy, foraminotomy and decompression of the lateral recess, CPT code 47494.  3.  L2-3 posterolateral fusion, CPT code 10756. 4.  Placement of segmental spinal instrumentation from L2 to L5 using the [place] cortical screw and martín system, CPT code 33213.  5.  L2-3 anterior lumbar fusion using a prone DLIF approach, CPT code 51436. 6.  L2-3 anterior spinal instrumentation placement, CPT code 78211. 7.  Placement of biomechanical interbody device at L2-3 using the Alphatec DLIF interbody cage, CPT code 81701. SURGEON:  Lino Mariee MD    ASSISTANT:  Staff. ANESTHESIA:  GETA. COMPLICATIONS:  None. SPECIMENS REMOVED:  None. IMPLANTS:  Anclemo and Alphatec    ESTIMATED BLOOD LOSS:  350 mL. FLUIDS:  1500 mL crystalloid. DRAINS:  One Hemovac. INDICATIONS:  The patient is a 64-year-old female who has had a previous L3 to L5 instrumented fusion with decompression several years previous and she has developed progressively worsening pain, mainly in the right leg. She has had some right buttocks, hip pain ever since her original surgery. She was found to have developed spondylolisthesis and severe stenosis at the L2-3 level above her fusion.   She initially did well with epidural steroid injections, but those are no longer giving her relief and then she is already taking narcotic pain medication, which is not sufficiently addressing the pain, so she is here for surgical treatment. PROCEDURE:  The patient was brought to the operating room. After administration of anesthesia, IV antibiotics and placement of monitoring lines, she was positioned prone on the Sheyla Slain frame with the Alphatec prone DLIF attachment device. She was secured to that. We then prepped her back and left flank with Betadine and sterilely draped it. At this point, surgeon called a time-out and confirmed the procedure to be performed, her allergy history and the fact she had received her preoperative IV antibiotics. C-arm was brought in and then we identified the L2-3 level and marked the front and back of it on and the skin from the left side. We made an incision along the disc space between those two lines with a scalpel, dissected down with electrocautery through the fascial layer and then we entered the two deeper fascial layers with Carty scissors and then digitally opened this area. We could palpate the spinous process and actually we could palpate the spine itself. We swept the retroperitoneal contents anteriorly. Then using lateral C-arm, we inserted the starting guidewire into the wound and positioned it over the mid aspect of the L2-3 disc and then inserted it into the disc. We then sequentially placed dilators over this and we tested each dilator with nerve stimulation. There was no stimulation below 20 mA. We then put the retractor over the dilators and secured it to the bed with the appropriate attachment device. We then aligned this up over the middle aspect of the L2-3 disc and lateral C-arm views. Then, we checked with AP view to make sure we were lined up well with the disc. We distracted the retractor mainly posteriorly and once we were satisfied, we secured it to the disc space with a shank that was malleted into place. We checked on the C-arm pictures to confirm this.   Then, we tested through the opening of the retractor after having first removed the distractors and tested and there was no stimulation below 20 mA anywhere. We also ran the anterior aspect of the retractor several millimeters forward as well. We cleaned the disc off with the Kittner. Then we incised the disc annulus with a scalpel and removed disc with pituitaries. We then used a  to remove disc and then used the uterine curette and a rasp to further prepare the endplates. We monitored this through an AP view to make sure we stayed within the disc space. Once we had removed this, we used the trial to determine the size of the interbody device and we elected to use a 9 mm lordotic interbody device as the trial had a nice tight fit. We packed the device with ProteiOS bone graft substitute and attached the insertion device to it. Then, we inserted it into the wound and checking on AP C-arm views, we malleted this securely into the disc space. We used a 50-mm length 9-mm tall lordotic interbody cage. Once we were satisfied with placement, we removed the insertion device and we inserted the anterior plate through the wound and attached it to the vertebral bodies and the interbody device itself and held it to the interbody device with a small screw. We drilled the holes into the vertebral bodies with a punch drill and then inserted 45 mm length cancellus screws to secure plate to the bone. We took AP lateral views. This seemed to be well-positioned. So, we removed the shank from the disc space and we let the retractor down to neutral size and we removed it from the wound and got final AP and lateral C-arm x-rays that showed good placement of the interbody device, good lordosis. No problems. We then closed this wound by reapproximating the deeper fascia with interrupted stitches of #1 Vicryl.   The subcutaneous tissue was reapproximated with interrupted stitches of 2-0 Vicryl and skin was closed with a running subcuticular stitch of 3-0 Vicryl. Dermabond Prineo was applied to the incision. We then turned our attention to the posterior laminectomy and fusion and since the patient was already in the prone position, we reentered the posterior incision with a scalpel and extended it cephalad. We dissected down through the subcutaneous tissue and scar with electrocautery. We went down to the deep fascia and cephalad we could palpate the remaining spinous processes. We dissected down on either side and then out laterally until we could find our instrumentation and dissected it all the way down to the L5 cortical screw bilaterally. We cleaned all the scars tissue off the spine and we cleaned out the lateral gutter between the L2 and L3 levels, identified the transverse processes and cleaned all the soft tissue off of this with electrocautery and rongeurs and then we just packed this area. At the previous instrumentation, we removed initially the cross connector between the L4 and L5 screws that were attached to both rods and then we removed the locking nuts at all levels and then removed the rods bilaterally. We tested screws. They felt secured to the bone everywhere. There was concern because of the patient's longstanding right leg pain an MRI scan showed foraminal stenosis and some degree of disc herniation that we needed to the re-decompress  this area, but in addition we were concerned that the screw might protrude into the foramen as well. So, we removed the L4 screw on the right side and we used nerve stimulation down this hole and the screw holes seemed to be intact without any perforations and there was zero stimulation and so it was felt that this screw was appropriately placed and it was repositioned. We then removed the spinous process of L2 with a rongeur and saved this for bone graft. We then burred down the lamina of L2 and L3 with a high-speed bur until it was thinned out.   Then we decompressed this by doing laminectomies with Kerrison and there was significant thickened ligamentum flavum in the lateral recesses bilaterally, but it was much bigger on the right than left and we excised this and we checked out through the L2 foramen and it was wide open. We needed to check down at the L3-4 level on the right, so we dissected the scar tissue off the bone using the angled curettes and electrocautery. Then, we used a Kerrison as well and we undercut the facet joint, identified the foramen and we performed a foraminotomy and it still felt relatively tight, so we incised the disc annulus with a scalpel and removed disc in this foraminal area with pituitaries and eventually after removing enough disc the right-sided foramen felt open. We felt we had satisfactorily decompressed her spine and all potential areas of sources of pain. We then irrigated the wound, suctioned it dry. We determined where we wanted to place our screw holes for the L2 screws, made a starting hole with a bur. We palpated the pedicles and then we used a 4.5-mm drill to drill our starting holes and we tested this with nerve stimulation. There was no stimulation below 13 mA on either side and then we tapped it with a 5.5 mm tap and inserted 5.5 diameter 35-mm length screws bilaterally and then we got AP and lateral C-arm x-rays to make sure they were well positioned. Once we were satisfied and we did do nerve stimulation with screws in place, there was no stimulation below 15 mA. We placed rods of appropriate length into the screws and placed the locking nuts and securely tightened those and used a torque wrench to do this. We then took a final AP and lateral C-arm x-ray which showed overall good spinal alignment. Her interbody cage accentuated her lordosis and we had straightened out her overall mild scoliosis. All the hardware appeared to be intact.   Once we had done that, we placed our local bone grafts from the decompression and demineralized bone matrix putty in the lateral gutters at L2-3 after first decorticating this with a high-speed bur bilaterally. Once we had done that, we were ready to close the  wound. We had irrigated and suctioned out the canal and placed FloSeal over the canal to prevent bone graft from falling in. We placed a Hemovac drain deep in the wound and brought out through a separate proximal stab incision. We then closed deep fascia over this with interrupted figure-of-eight stitches of #1 Vicryl. We placed some vancomycin powder on top of this and closed subcutaneous tissue over with interrupted stitches of 2-0 Vicryl. Skin was closed with a running subcuticular stitch of 3-0 Vicryl. Dermabond Prineo was applied to the incision and then dry sterile dressings were applied to both incisions. The patient was then rolled supine onto the recovery room bed having tolerated the procedure well.         Meri Stratton MD      [id number]  D:  03/03/2022 13:46  T:  03/03/2022 23:34  JOB #:  [id number]

## 2022-03-05 LAB
BASOPHILS # BLD: 0 K/UL (ref 0–0.2)
BASOPHILS NFR BLD: 0 % (ref 0–2)
DIFFERENTIAL METHOD BLD: ABNORMAL
EOSINOPHIL # BLD: 0.1 K/UL (ref 0–0.8)
EOSINOPHIL NFR BLD: 1 % (ref 0.5–7.8)
ERYTHROCYTE [DISTWIDTH] IN BLOOD BY AUTOMATED COUNT: 13.1 % (ref 11.9–14.6)
ERYTHROCYTE [DISTWIDTH] IN BLOOD BY AUTOMATED COUNT: 14.6 % (ref 11.9–14.6)
FERRITIN SERPL-MCNC: 422 NG/ML (ref 8–388)
FOLATE SERPL-MCNC: 7.1 NG/ML (ref 3.1–17.5)
HCT VFR BLD AUTO: 19 % (ref 35.8–46.3)
HCT VFR BLD AUTO: 36 % (ref 35.8–46.3)
HGB BLD-MCNC: 12 G/DL (ref 11.7–15.4)
HGB BLD-MCNC: 5.9 G/DL (ref 11.7–15.4)
HISTORY CHECKED?,CKHIST: NORMAL
HISTORY CHECKED?,CKHIST: NORMAL
IMM GRANULOCYTES # BLD AUTO: 0.1 K/UL (ref 0–0.5)
IMM GRANULOCYTES NFR BLD AUTO: 1 % (ref 0–5)
IRON SATN MFR SERPL: 13 %
IRON SERPL-MCNC: 33 UG/DL (ref 35–150)
LYMPHOCYTES # BLD: 2.9 K/UL (ref 0.5–4.6)
LYMPHOCYTES NFR BLD: 23 % (ref 13–44)
MCH RBC QN AUTO: 30 PG (ref 26.1–32.9)
MCH RBC QN AUTO: 30.3 PG (ref 26.1–32.9)
MCHC RBC AUTO-ENTMCNC: 31.1 G/DL (ref 31.4–35)
MCHC RBC AUTO-ENTMCNC: 33.3 G/DL (ref 31.4–35)
MCV RBC AUTO: 90 FL (ref 79.6–97.8)
MCV RBC AUTO: 97.4 FL (ref 79.6–97.8)
MONOCYTES # BLD: 0.9 K/UL (ref 0.1–1.3)
MONOCYTES NFR BLD: 7 % (ref 4–12)
NEUTS SEG # BLD: 8.5 K/UL (ref 1.7–8.2)
NEUTS SEG NFR BLD: 68 % (ref 43–78)
NRBC # BLD: 0 K/UL (ref 0–0.2)
NRBC # BLD: 0 K/UL (ref 0–0.2)
PLATELET # BLD AUTO: 199 K/UL (ref 150–450)
PLATELET # BLD AUTO: 282 K/UL (ref 150–450)
PMV BLD AUTO: 10 FL (ref 9.4–12.3)
PMV BLD AUTO: 10.2 FL (ref 9.4–12.3)
RBC # BLD AUTO: 1.95 M/UL (ref 4.05–5.2)
RBC # BLD AUTO: 4 M/UL (ref 4.05–5.2)
T4 FREE SERPL-MCNC: 0.9 NG/DL (ref 0.78–1.46)
TIBC SERPL-MCNC: 248 UG/DL (ref 250–450)
TSH SERPL DL<=0.005 MIU/L-ACNC: 1.15 UIU/ML (ref 0.36–3.74)
VIT B12 SERPL-MCNC: 209 PG/ML (ref 193–986)
WBC # BLD AUTO: 12.5 K/UL (ref 4.3–11.1)
WBC # BLD AUTO: 8.8 K/UL (ref 4.3–11.1)

## 2022-03-05 PROCEDURE — 74011250636 HC RX REV CODE- 250/636: Performed by: INTERNAL MEDICINE

## 2022-03-05 PROCEDURE — 82607 VITAMIN B-12: CPT

## 2022-03-05 PROCEDURE — 82746 ASSAY OF FOLIC ACID SERUM: CPT

## 2022-03-05 PROCEDURE — 84443 ASSAY THYROID STIM HORMONE: CPT

## 2022-03-05 PROCEDURE — 85025 COMPLETE CBC W/AUTO DIFF WBC: CPT

## 2022-03-05 PROCEDURE — P9016 RBC LEUKOCYTES REDUCED: HCPCS

## 2022-03-05 PROCEDURE — 36430 TRANSFUSION BLD/BLD COMPNT: CPT

## 2022-03-05 PROCEDURE — 84439 ASSAY OF FREE THYROXINE: CPT

## 2022-03-05 PROCEDURE — 85027 COMPLETE CBC AUTOMATED: CPT

## 2022-03-05 PROCEDURE — 30233N1 TRANSFUSION OF NONAUTOLOGOUS RED BLOOD CELLS INTO PERIPHERAL VEIN, PERCUTANEOUS APPROACH: ICD-10-PCS | Performed by: ORTHOPAEDIC SURGERY

## 2022-03-05 PROCEDURE — 97530 THERAPEUTIC ACTIVITIES: CPT

## 2022-03-05 PROCEDURE — 74011250637 HC RX REV CODE- 250/637: Performed by: PHYSICIAN ASSISTANT

## 2022-03-05 PROCEDURE — 83540 ASSAY OF IRON: CPT

## 2022-03-05 PROCEDURE — 36415 COLL VENOUS BLD VENIPUNCTURE: CPT

## 2022-03-05 PROCEDURE — 74011000250 HC RX REV CODE- 250: Performed by: ORTHOPAEDIC SURGERY

## 2022-03-05 PROCEDURE — 65270000029 HC RM PRIVATE

## 2022-03-05 PROCEDURE — 82728 ASSAY OF FERRITIN: CPT

## 2022-03-05 PROCEDURE — 74011250637 HC RX REV CODE- 250/637: Performed by: ORTHOPAEDIC SURGERY

## 2022-03-05 PROCEDURE — 74011250636 HC RX REV CODE- 250/636: Performed by: ORTHOPAEDIC SURGERY

## 2022-03-05 PROCEDURE — 2709999900 HC NON-CHARGEABLE SUPPLY

## 2022-03-05 RX ORDER — SODIUM CHLORIDE 9 MG/ML
250 INJECTION, SOLUTION INTRAVENOUS AS NEEDED
Status: DISCONTINUED | OUTPATIENT
Start: 2022-03-05 | End: 2022-03-07 | Stop reason: HOSPADM

## 2022-03-05 RX ORDER — HYDROMORPHONE HYDROCHLORIDE 2 MG/1
2 TABLET ORAL
Status: DISCONTINUED | OUTPATIENT
Start: 2022-03-05 | End: 2022-03-07 | Stop reason: HOSPADM

## 2022-03-05 RX ORDER — FUROSEMIDE 10 MG/ML
20 INJECTION INTRAMUSCULAR; INTRAVENOUS ONCE
Status: COMPLETED | OUTPATIENT
Start: 2022-03-05 | End: 2022-03-05

## 2022-03-05 RX ADMIN — CELECOXIB 100 MG: 100 CAPSULE ORAL at 09:04

## 2022-03-05 RX ADMIN — DOCUSATE SODIUM 100 MG: 100 CAPSULE, LIQUID FILLED ORAL at 17:18

## 2022-03-05 RX ADMIN — BUSPIRONE HYDROCHLORIDE 15 MG: 10 TABLET ORAL at 17:18

## 2022-03-05 RX ADMIN — Medication 1 AMPULE: at 09:05

## 2022-03-05 RX ADMIN — AMLODIPINE BESYLATE 5 MG: 5 TABLET ORAL at 09:05

## 2022-03-05 RX ADMIN — VALSARTAN 160 MG: 80 TABLET ORAL at 09:05

## 2022-03-05 RX ADMIN — SODIUM CHLORIDE 250 ML: 900 INJECTION, SOLUTION INTRAVENOUS at 09:06

## 2022-03-05 RX ADMIN — SODIUM CHLORIDE, PRESERVATIVE FREE 10 ML: 5 INJECTION INTRAVENOUS at 22:00

## 2022-03-05 RX ADMIN — LORAZEPAM 1 MG: 0.5 TABLET ORAL at 16:14

## 2022-03-05 RX ADMIN — ZOLPIDEM TARTRATE 10 MG: 5 TABLET ORAL at 00:56

## 2022-03-05 RX ADMIN — HYDROMORPHONE HYDROCHLORIDE 1 MG: 1 INJECTION, SOLUTION INTRAMUSCULAR; INTRAVENOUS; SUBCUTANEOUS at 06:03

## 2022-03-05 RX ADMIN — PANTOPRAZOLE SODIUM 40 MG: 40 TABLET, DELAYED RELEASE ORAL at 09:05

## 2022-03-05 RX ADMIN — BUPROPION HYDROCHLORIDE 150 MG: 150 TABLET, EXTENDED RELEASE ORAL at 17:18

## 2022-03-05 RX ADMIN — MEDROXYPROGESTERONE ACETATE 5 MG: 10 TABLET ORAL at 09:04

## 2022-03-05 RX ADMIN — HYDROMORPHONE HYDROCHLORIDE 2 MG: 2 TABLET ORAL at 10:13

## 2022-03-05 RX ADMIN — PREGABALIN 75 MG: 75 CAPSULE ORAL at 09:05

## 2022-03-05 RX ADMIN — HYDROMORPHONE HYDROCHLORIDE 2 MG: 2 TABLET ORAL at 20:50

## 2022-03-05 RX ADMIN — ESTROGENS, CONJUGATED 1.25 MG: 0.62 TABLET, FILM COATED ORAL at 09:05

## 2022-03-05 RX ADMIN — CELECOXIB 100 MG: 100 CAPSULE ORAL at 17:18

## 2022-03-05 RX ADMIN — HYDROMORPHONE HYDROCHLORIDE 2 MG: 2 TABLET ORAL at 16:14

## 2022-03-05 RX ADMIN — Medication 1 AMPULE: at 20:50

## 2022-03-05 RX ADMIN — SODIUM CHLORIDE, PRESERVATIVE FREE 10 ML: 5 INJECTION INTRAVENOUS at 05:15

## 2022-03-05 RX ADMIN — FUROSEMIDE 20 MG: 10 INJECTION, SOLUTION INTRAMUSCULAR; INTRAVENOUS at 11:13

## 2022-03-05 RX ADMIN — CYCLOBENZAPRINE 10 MG: 10 TABLET, FILM COATED ORAL at 09:05

## 2022-03-05 RX ADMIN — PREGABALIN 75 MG: 75 CAPSULE ORAL at 16:14

## 2022-03-05 RX ADMIN — DOCUSATE SODIUM 100 MG: 100 CAPSULE, LIQUID FILLED ORAL at 09:05

## 2022-03-05 RX ADMIN — BUPROPION HYDROCHLORIDE 150 MG: 150 TABLET, EXTENDED RELEASE ORAL at 09:05

## 2022-03-05 RX ADMIN — BUSPIRONE HYDROCHLORIDE 15 MG: 10 TABLET ORAL at 09:04

## 2022-03-05 NOTE — PROGRESS NOTES
March 5, 2022         Post Op day: 2 Days Post-Op Procedure(s) (LRB):  L2-L3 DLIF (N/A)  L2-L3 LAMI FUSION/ L2-L5 REVISION INSTRUMENTATION, REDO RIGHT L3-4 LAMI/DISC (N/A)      Admit Date: 3/3/2022  Admit Diagnosis: Lumbar stenosis with neurogenic claudication [M48.062]  Spinal stenosis [M48.00]       Principle Problem: <principal problem not specified>.            Subjective: complains of back pain and says that the pain medication is not working, No SOB, No Chest Pain, No Nausea or Vomiting     Objective:   Vital Signs are Stable, No Acute Distress, Alert,  Drain output 190cc last 24hrs,  Neurovascular exam is normal.     Assessment / Plan :  Patient Active Problem List   Diagnosis Code    Hypertension I10    Fatigue R53.83    Gastro-esophageal reflux disease with esophagitis K21.00    Neck pain M54.2    Sinus congestion R09.81    Microhematuria R31.29    B12 deficiency E53.8    Renal cyst, congenital, right Q61.00    Depression with anxiety F41.8    Hx of fracture of pelvis Z87.81    Back pain M54.9    Post-viral cough syndrome R05.8    Chronic pelvic pain in female R10.2, G89.29    Chronic narcotic use F11.90    Other chronic pain G89.29    Cervical spinal stenosis M48.02    Cervical radiculopathy at C5 M54.12    Spinal stenosis of lumbar region at multiple levels M48.061    Acute blood loss as cause of postoperative anemia D62    DDD (degenerative disc disease), lumbar M51.36    Numbness and tingling R20.0, R20.2    Pain in both hands M79.641, M79.642    Neuropathy G62.9    Weakness of both legs R29.898    Postural imbalance R29.3    Numbness and tingling of both feet R20.0, R20.2    Spondylolisthesis at L2-L3 level M43.16    Spinal stenosis M48.00      Patient Vitals for the past 8 hrs:   BP Temp Pulse Resp SpO2   03/05/22 0823 (!) 119/46 98.5 °F (36.9 °C) 87 17 97 %   03/05/22 0808 (!) 122/46 98.2 °F (36.8 °C) 96 16 97 %   03/05/22 0730 135/61 98.8 °F (37.1 °C) 95 16 96 %   03/05/22 0315 (!) 120/54 98.7 °F (37.1 °C) 92 16 96 %    Temp (24hrs), Av.3 °F (36.8 °C), Min:97.9 °F (36.6 °C), Max:98.8 °F (37.1 °C)    Body mass index is 24.02 kg/m².     Lab Results   Component Value Date/Time    HGB 5.9 (LL) 2022 04:42 AM          S/P Procedure(s) (LRB):  L2-L3 DLIF (N/A)  L2-L3 LAMI FUSION/ L2-L5 REVISION INSTRUMENTATION, REDO RIGHT L3-4 LAMI/DISC (N/A)    Acute blood loss anemia: stat hospitalist consult placed     Pain control: change percocet to oral dilaudid     DC drain when output less than 50cc    Medical Mgmt per hospitalist    Continue PT    Fall Precautions         Signed By: MIRANDA Martinez  3/5/2022,  9:49 AM

## 2022-03-05 NOTE — PROGRESS NOTES
ACUTE PHYSICAL THERAPY GOALS:  (Developed with and agreed upon by patient and/or caregiver. )  LTG:  (1.)Ms. Pepe Cee will move from supine to sit and sit to supine, scoot up and down and roll side to side in bed with INDEPENDENCE via log roll within 7 treatment day(s). (2.)Ms. Pepe Cee will transfer from bed to chair and chair to bed with MODIFIED INDEPENDENCE using the least restrictive device within 7 treatment day(s). (3.)Ms. Pepe Cee will ambulate with SUPERVISION for 300+ feet with the least restrictive device within 7 treatment day(s). GOAL MET 3/5/2022    (4.)Ms. Pepe Cee will independently verbalize 3/3 post op spinal precautions and maintain compliance within 7 days. PHYSICAL THERAPY: Daily Note and AM Treatment Day # 2    Charu Sheehan is a 74 y.o. female   PRIMARY DIAGNOSIS: <principal problem not specified>  Lumbar stenosis with neurogenic claudication [M48.062]  Spinal stenosis [M48.00]  Procedure(s) (LRB):  L2-L3 DLIF (N/A)  L2-L3 LAMI FUSION/ L2-L5 REVISION INSTRUMENTATION, REDO RIGHT L3-4 LAMI/DISC (N/A)  2 Days Post-Op    ASSESSMENT:     REHAB RECOMMENDATIONS: CURRENT LEVEL OF FUNCTION:  (Most Recently Demonstrated)   Recommendation to date pending progress:  [address] [place] [address] Therapy  Equipment:    None Bed Mobility:   Modified Independent  Sit to Stand:   Contact Guard Assistance  Transfers:   Standby Assistance  Gait/Mobility:   Standby Assistance     ASSESSMENT:  Ms. Pepe Cee is supine and had just finished her first unit of blood and is ready to walk. She sat herself up without my help and used the bathroom independently. She walked 750 ft in the hallway with the walker and her back brace with good ability. Moving better all the time. SUBJECTIVE:   Ms. Pepe Cee states, \"lets do another lap\"    SOCIAL HISTORY/ LIVING ENVIRONMENT: Lives alone. Independent at baseline without DME use.   Home Environment: Private residence  # Steps to Enter: 2  One/Two Story Residence: [facility] story, live on 1st floor  Living Alone: Yes  Support Systems: Other Family Member(s),Friend/Neighbor  OBJECTIVE:     PAIN: VITAL SIGNS: LINES/DRAINS:   Pre Treatment: Pain Screen  Pain Scale 1: FLACC  Pain Intensity 1: 3  Post Treatment: 5/10 (RN notified)   Hemovac  O2 Device: None (Room air)     MOBILITY: I Mod I S SBA CGA Min Mod Max Total  NT x2 Comments:   Bed Mobility    Rolling [] [x] [] [] [] [] [] [] [] [] []    Supine to Sit [] [x] [] [] [] [] [] [] [] [] []    Scooting [] [x] [] [] [] [] [] [] [] [] []    Sit to Supine [] [x] [] [] [] [] [] [] [] [] []    Transfers    Sit to Stand [] [] [] [] [x] [] [] [] [] [] []    Bed to Chair [] [] [] [] [] [] [] [] [] [] []    Stand to Sit [] [] [] [x] [] [] [] [] [] [] []    I=Independent, Mod I=Modified Independent, S=Supervision, SBA=Standby Assistance, CGA=Contact Guard Assistance,   Min=Minimal Assistance, Mod=Moderate Assistance, Max=Maximal Assistance, Total=Total Assistance, NT=Not Tested    BALANCE: Good Fair+ Fair Fair- Poor NT Comments   Sitting Static [x] [] [] [] [] []    Sitting Dynamic [x] [] [] [] [] []              Standing Static [] [x] [] [] [] []    Standing Dynamic [] [x] [] [] [] []      GAIT: I Mod I S SBA CGA Min Mod Max Total  NT x2 Comments:   Level of Assistance [] [x] [x] [] [] [] [] [] [] [] []    Distance 750 ft    DME Rolling Walker    Gait Quality Slow, cautious    Weightbearing  Status N/A     I=Independent, Mod I=Modified Independent, S=Supervision, SBA=Standby Assistance, CGA=Contact Guard Assistance,   Min=Minimal Assistance, Mod=Moderate Assistance, Max=Maximal Assistance, Total=Total Assistance, NT=Not Tested    PLAN:   FREQUENCY/DURATION: PT Plan of Care: BID for duration of hospital stay or until stated goals are met, whichever comes first.  TREATMENT:     TREATMENT:   ($$ Therapeutic Activity: 23-37 mins    )  Therapeutic Activity (25 Minutes):  Therapeutic activity included Rolling, Supine to Sit, Sit to Supine, Scooting, Transfer Training, Ambulation on level ground, Sitting balance  and Standing balance to improve functional Mobility, Strength and Activity tolerance.     TREATMENT GRID:  N/A    AFTER TREATMENT POSITION/PRECAUTIONS:  Bed, Needs within reach and RN notified    INTERDISCIPLINARY COLLABORATION:  RN/PCT and PT/PTA    TOTAL TREATMENT DURATION:  PT Patient Time In/Time Out  Time In: 1110  Time Out: Minna 75, PTA

## 2022-03-05 NOTE — CONSULTS
Hospitalist History and Physical   Admit Date:  3/3/2022  6:23 AM   Name:  Jose Sheppard   Age:  74 y.o. Sex:  female  :  1955   MRN:  [id number]   Room:  [facility]    Presenting Complaint: No chief complaint on file. Reason(s) for Admission: Lumbar stenosis with neurogenic claudication [M48.062]  Spinal stenosis [M48.00]     History of Present Illness:   Jose Sheppard is a 74 y.o. female with medical history of  chronic pain, anxiety, post menstrual bleeding admitted to Ortho service and is status post    L2-L3 DLIF (N/A)  L2-L3 LAMI FUSION/ L2-L5 REVISION INSTRUMENTATION, REDO RIGHT L3-4 LAMI/DISC (N/A)        For lumbar stenosis. Today, medicine service consulted for patient hemoglobin of 5.9. Anemia: Patient has intermittent episodes of anemia. As per patient she has history of post menstrual bleeding and yesterday she had large amount of vaginal bleeding which was evidenced by labs. Per patient, she had Pap smear twice in 2 ultrasound with no obvious cause. She follow-up with gynecology as outpatient. Denies any bleeding in her stool or urine. Feeling slightly weak but better than yesterday. As per patient yesterday she was in pain. Anxiety: As per patient her anxiety is managed by her primary care and gynecologist.  No acute issue at present. Denies any active cigarette/alcohol or illicit drug use. Family history positive for breast cancer in her sister. Past medical history, social history, hospital course, home medication, hospital course and old records [last GYN note in ] reviewed. Denies any chest pain, shortness of breath, abdominal pain, nausea or vomiting. Rest review of system negative except mentioned above      Assessment & Plan: Active Problems:    Anemia: Patient hemoglobin 5.9. Vaginal bleeding has stopped per patient. -Iron studies, vitamin B12 and folate ordered.   Discussed with the nurse to added to the morning sample.  -2 unit PRBC ordered.  -1 dose of Lasix in between transfusion.  -Nurse to notify on-call MD with posttransfusion CBC results.  -Pelvic ultrasound ordered. -Gynecology consulted.  -I have held patient's IV fluid as patient is tolerating p.o. without any issues. -SCDs for DVT prophylaxis. Spinal stenosis and other issues: Per primary team.      Surgery and surgery related management including DVT prophylaxis, drop in hemoglobin and pain management per primary team.    Rest management per primary team.    Thanks for consultation. We will continue to follow along on daily basis. Please notify on-call medicine physician with questions or if patient clinical condition changes.      Hospital Problems as of 3/5/2022 Date Reviewed: 2022          Codes Class Noted - Resolved POA    Spondylolisthesis at L2-L3 level ICD-10-CM: M43.16  ICD-9-CM: 756.12  3/3/2022 - Present Yes        Spinal stenosis ICD-10-CM: M48.00  ICD-9-CM: 724.00  3/3/2022 - Present Unknown        Spinal stenosis of lumbar region at multiple levels ICD-10-CM: M48.061  ICD-9-CM: 724.02  2019 - Present Yes              Past History:  Past Medical History:   Diagnosis Date    Abnormal Papanicolaou smear of cervix     ASCUS    Anemia     Arthritis     pt says if she has it it's mild    Bowel trouble     Chronic pain     lumbar/cervical    Depression     Ear problems     Elective      Fatigue 2012    GERD (gastroesophageal reflux disease)     controlled with med    History of lumbar laminectomy 2019    L3-5    Hypertension     controlled with med    Menopause     Miscarriage     Neck pain 2012    Pain in breast     RIGHT    Pelvis fracture, right [facility])     onset age 62    Psychiatric disorder     depression and anxiety    Reflux esophagitis 2012    Sinus congestion 2014     Past Surgical History:   Procedure Laterality Date    HX BACK SURGERY  2019    HX CATARACT REMOVAL Bilateral 2017    with lens replacement    HX COLONOSCOPY  02/05/2009    HX ENDOSCOPY      HX ORTHOPAEDIC Left 1998    FOOT- pin in great toe for not being able to move it    NEUROLOGICAL PROCEDURE UNLISTED  10/17/2018    cervical fusion, anterior and posterior    US GUIDED CORE BREAST BIOPSY  2016    negative      Allergies   Allergen Reactions    Latex, Natural Rubber Itching    Celecoxib Other (comments)     constipation  Other reaction(s): Other- (not listed) - Allergy  constipation    Mirtazapine Drowsiness     Greater than expected reaction  Other reaction(s): Drowsiness-Intolerance  Greater than expected reaction      Social History     Tobacco Use    Smoking status: Never Smoker    Smokeless tobacco: Never Used   Substance Use Topics    Alcohol use: No      Family History   Problem Relation Age of Onset    Breast Cancer Mother 80    Heart Disease Mother     Hypertension Mother    Theodoro Milder Arthritis-rheumatoid Brother     Breast Cancer Sister 62        HAD BILATERAL MASTECTOMY 06/2013    Cancer Sister         breast    Anemia Father         Pernicious anemia    Heart Disease Father       Family history reviewed and negative except as noted above.     Immunization History   Administered Date(s) Administered    COVID-19, Moderna, Primary or Immunocompromised Series, MRNA, PF, 100mcg/0.5mL 02/26/2021, 03/26/2021    Influenza High Dose Vaccine PF 12/17/2018, 09/28/2020    Influenza Vaccine 12/03/2014    Influenza Vaccine (Quad) Mdck Pf (>2 Yrs Flucelvax QUAD [id number]) 08/29/2017    Influenza Vaccine CollabFinder) PF (>6 Mo Flulaval, Fluarix, and >3 Yrs Afluria, Fluzone 12345) 11/28/2016    Influenza Vaccine (Tri) Adjuvanted (>65 Yrs FLUAD TRI [id number]) 10/29/2019    Influenza Vaccine PF 10/06/2015    Influenza, High-dose, Quadrivalent (>65 Yrs Fluzone High Dose Quad [id number]) 09/27/2021    Influenza, Quadrivalent, Adjuvanted (>65 Yrs FLUAD QUAD [id number]) 09/28/2020    Pneumococcal Polysaccharide (PPSV-23) 04/29/2019, 10/29/2019     Prior to Admit Medications:  Current Outpatient Medications   Medication Instructions    amLODIPine (NORVASC) 5 mg tablet TAKE ONE TABLET BY MOUTH ONE TIME DAILY    buPROPion XL (WELLBUTRIN XL) 300 mg XL tablet TAKE ONE TABLET BY MOUTH ONE TIME DAILY    busPIRone (BUSPAR) 15 mg tablet 2 TIMES DAILY    celecoxib (CELEBREX) 100 mg, 2 TIMES DAILY    cholecalciferol (VITAMIN D3) 5,000 Units, Oral, DAILY    conjugated estrogens (PREMARIN) 1.25 mg tablet TAKE 1 TABLET BY MOUTH DAILY    cyclobenzaprine (FLEXERIL) 10 mg, Oral, 3 TIMES DAILY AS NEEDED    ferrous sulfate (Iron) 325 mg (65 mg iron) tablet Oral, DAILY BEFORE BREAKFAST    guaiFENesin-dextromethorphan SR (Mucinex DM) 600-30 mg per tablet 1 Tablet, Oral, 2 TIMES DAILY    HYDROcodone-acetaminophen (Norco)  mg tablet 1 Tablet, Oral, EVERY 6 HOURS AS NEEDED    hydrOXYzine pamoate (VISTARIL) 100 mg, Oral, 3 TIMES DAILY AS NEEDED    LORazepam (ATIVAN) 1 mg tablet TAKE ONE TABLET BY MOUTH TWICE A DAY AS NEEDED FOR ANXIETY    medroxyPROGESTERone (PROVERA) 5 mg, Oral, DAILY    pantoprazole (PROTONIX) 40 mg tablet TAKE ONE TABLET BY MOUTH ONE TIME DAILY    pregabalin (LYRICA) 75 mg capsule 3 TIMES DAILY    simethicone (GAS-X) 125 mg, Oral, 4 TIMES DAILY AS NEEDED    traZODone (DESYREL) 50 mg tablet Take 1-2 tabs po QHS PRN    valsartan (DIOVAN) 160 mg tablet TAKE ONE TABLET BY MOUTH ONE TIME DAILY    zolpidem (AMBIEN) 10 mg tablet TAKE ONE TABLET BY MOUTH AT BEDTIME       Objective:     Patient Vitals for the past 24 hrs:   Temp Pulse Resp BP SpO2   03/05/22 1254 98.1 °F (36.7 °C) 86 17 (!) 121/43 97 %   03/05/22 1239 98.3 °F (36.8 °C) 97 18 (!) 123/53 97 %   03/05/22 1107 98.1 °F (36.7 °C) 92 16 (!) 124/48 95 %   03/05/22 0823 98.5 °F (36.9 °C) 87 17 (!) 119/46 97 %   03/05/22 0808 98.2 °F (36.8 °C) 96 16 (!) 122/46 97 %   03/05/22 0730 98.8 °F (37.1 °C) 95 16 135/61 96 %   03/05/22 0315 98.7 °F (37.1 °C) 92 16 (!) 120/54 96 %   03/04/22 2240 97.9 °F (36.6 °C) 79 14 (!) 109/50 95 %   03/04/22 1926 98.2 °F (36.8 °C) 93 15 (!) 107/49 98 %   03/04/22 1539 98 °F (36.7 °C) 82 17 (!) 127/49 98 %     Oxygen Therapy  O2 Sat (%): 97 % (03/05/22 1254)  Pulse via Oximetry: 87 beats per minute (03/03/22 1536)  O2 Device: None (Room air) (03/05/22 0808)  O2 Flow Rate (L/min): 3 l/min (03/03/22 1604)    Estimated body mass index is 24.02 kg/m² as calculated from the following:    Height as of this encounter: 5' 3\" (1.6 m). Weight as of this encounter: 61.5 kg (135 lb 9.6 oz). Intake/Output Summary (Last 24 hours) at 3/5/2022 1333  Last data filed at 3/5/2022 0908  Gross per 24 hour   Intake 236 ml   Output 190 ml   Net 46 ml         Physical Exam:     Blood pressure (!) 121/43, pulse 86, temperature 98.1 °F (36.7 °C), resp. rate 17, height 5' 3\" (1.6 m), weight 61.5 kg (135 lb 9.6 oz), last menstrual period 10/30/2017, SpO2 97 %. General:    Well nourished. No overt distress  Head:  Normocephalic, atraumatic  Eyes:  Pallor present  ENT:  Mucous membrane not dry  Neck:  Supple  CV:   RRR. No m/r/g. No jugular venous distension. Lungs:   CTAB. No wheezing, rhonchi, or rales. Respirations even, unlabored  Abdomen: Bowel sounds present. Soft, nontender, nondistended. Extremities: No cyanosis or clubbing. No edema  Skin:     No rashes and normal coloration. Warm and dry. Neuro:  AOx3, moving all 4 extremities, speech normal  Psych:  Normal mood and affect.       I have reviewed ordered lab tests and independently visualized imaging below:    Last 24hr Labs:    Procedures done this admission:  Procedure(s):  L2-L3 DLIF  L2-L3 LAMI FUSION/ L2-L5 REVISION INSTRUMENTATION, REDO RIGHT L3-4 LAMI/DISC    All Micro Results     None          SARS-CoV-2 Lab Results  \"Novel Coronavirus\" Test: No results found for: COV2NT   \"Emergent Disease\" Test: No results found for: EDPR  \"SARS-COV-2\" Test: No results found for: XGCOVT  \"Precision Labs\" Test: No results found for: RSLT  Rapid Test: No results found for: COVR         Labs: Results:       BMP, Mg, Phos Recent Labs     03/04/22  0502      K 4.0   *   CO2 27   AGAP 5*   BUN 4*   CREA 0.60   CA 8.0*         CBC Recent Labs     03/05/22  0442 03/04/22  1440 03/04/22  0502 03/03/22  1344 03/03/22  1344   WBC 8.8  --  9.9  --  10.1   RBC 1.95*  --  2.40*  --  2.66*   HGB 5.9* 7.4* 7.4*   < > 8.2*   HCT 19.0* 22.9* 22.6*   < > 24.9*     --  219  --  214    < > = values in this interval not displayed. LFT No results for input(s): ALT, TBIL, AP, TP, ALB, GLOB, AGRAT in the last 72 hours.     No lab exists for component: SGOT, GPT   Cardiac Testing No results found for: BNPP, BNP, CPK, RCK1, RCK2, RCK3, RCK4, CKMB, CKNDX, CKND1, TROPT, TROIQ   Coagulation Tests No results found for: PTP, INR, APTT, INREXT   A1c No results found for: HBA1C, YMU5VNCC   Lipid Panel Lab Results   Component Value Date/Time    Cholesterol, total 270 (H) 02/19/2018 04:08 PM    HDL Cholesterol 110 02/19/2018 04:08 PM    LDL, calculated 112 (H) 02/19/2018 04:08 PM    VLDL, calculated 48 (H) 02/19/2018 04:08 PM    Triglyceride 239 (H) 02/19/2018 04:08 PM      Thyroid Panel Lab Results   Component Value Date/Time    TSH 3.460 12/10/2021 12:38 PM    TSH 1.690 06/18/2019 12:35 PM    TSH 1.780 02/19/2018 04:08 PM    T4, Free 0.99 12/10/2021 12:38 PM        Most Recent UA Lab Results   Component Value Date/Time    Color YELLOW 02/23/2022 11:28 AM    Appearance CLEAR 02/23/2022 11:28 AM    Specific gravity 1.010 02/23/2022 11:28 AM    pH (UA) 5.5 02/23/2022 11:28 AM    Protein Negative 02/23/2022 11:28 AM    Glucose Negative 02/23/2022 11:28 AM    Ketone Negative 02/23/2022 11:28 AM    Bilirubin Negative 02/23/2022 11:28 AM    Blood TRACE (A) 02/23/2022 11:28 AM    Urobilinogen 0.2 02/23/2022 11:28 AM    Nitrites Negative 02/23/2022 11:28 AM    Leukocyte Esterase Negative 02/23/2022 11:28 AM    WBC 0 02/23/2022 11:28 AM    RBC 0-3 02/23/2022 11:28 AM    Epithelial cells 0 02/23/2022 11:28 AM    Bacteria 0 02/23/2022 11:28 AM    Casts 0-3 02/23/2022 11:28 AM            Other Studies:  No results found. Signed:  Christina Pacheco MD    Part of this note may have been written by using a voice dictation software. The note has been proof read but may still contain some grammatical/other typographical errors.

## 2022-03-05 NOTE — PROGRESS NOTES
Chart screened by  for potential discharge needs or concerns. PT eval complete with the recommendation for HH at dc. SW spoke with PT who treated the pt today and she thinks the pt will progress well enough that she will not need HH at dc. Pt ambulated 750' with an AD today. No other dc needs or concerns identified at present. Please notify/consult  if any discharge needs arise. Care Management Interventions  PCP Verified by CM: Yes  Last Visit to PCP: 12/13/21  Mode of Transport at Discharge:  Other (see comment) (family)  Discharge Durable Medical Equipment: No  Physical Therapy Consult: Yes  Occupational Therapy Consult: No  Speech Therapy Consult: No  Support Systems: Other Family Member(s)  Confirm Follow Up Transport: Family  Discharge Location  Patient Expects to be Discharged to[de-identified] Home with family assistance

## 2022-03-05 NOTE — PROGRESS NOTES
Problem: Falls - Risk of  Goal: *Absence of Falls  Description: Document Rojas Joyce Fall Risk and appropriate interventions in the flowsheet.   Outcome: Progressing Towards Goal  Note: Fall Risk Interventions:  Mobility Interventions: Bed/chair exit alarm,Communicate number of staff needed for ambulation/transfer,Patient to call before getting OOB,PT Consult for mobility concerns         Medication Interventions: Bed/chair exit alarm,Patient to call before getting OOB,Teach patient to arise slowly    Elimination Interventions: Bed/chair exit alarm,Call light in reach,Patient to call for help with toileting needs    History of Falls Interventions: Bed/chair exit alarm,Door open when patient unattended,Room close to nurse's station         Problem: Patient Education: Go to Patient Education Activity  Goal: Patient/Family Education  Outcome: Progressing Towards Goal     Problem: Pain  Goal: *Control of Pain  Outcome: Progressing Towards Goal  Goal: *PALLIATIVE CARE:  Alleviation of Pain  Outcome: Progressing Towards Goal

## 2022-03-05 NOTE — ROUTINE PROCESS
2 ux PRBC transfused today, patient tolerated well. Awaiting redraw lab results, additional 1 ux on hold pending new lab results per MD Radha Becerril. Hold additional 1 unit PRBC per MD Daryn Salinas.

## 2022-03-05 NOTE — PROGRESS NOTES
Consulted on known pt of Dr Ferguson Been   PT noted to have significant drop in Hg after surgery (8.2) from preop 12.2  Pt was doing PT and was noted to have an episode of vaginal bleeding subsequent Hg checked again and noted to be lower (7.4)   PER RN no longer bleeding from vagina  Recent Visit with DR Helena Romo 6/21 showed thin lining and 3 small  Fibroids  Formal consult to follow

## 2022-03-05 NOTE — PROGRESS NOTES
Was called to do a consult on this pt for vag bldg and anemia. Thus I opened her chart. She is an [facility] pt. Just saw Dr Jhonny Kc on 2-21-22. I called back and advised the  that [facility] is the appropriate practice to consult.

## 2022-03-06 ENCOUNTER — APPOINTMENT (OUTPATIENT)
Dept: ULTRASOUND IMAGING | Age: 67
DRG: 454 | End: 2022-03-06
Attending: INTERNAL MEDICINE
Payer: MEDICARE

## 2022-03-06 LAB
ABO + RH BLD: NORMAL
ALBUMIN SERPL-MCNC: 2.5 G/DL (ref 3.2–4.6)
ALBUMIN/GLOB SERPL: 0.8 {RATIO} (ref 1.2–3.5)
ALP SERPL-CCNC: 47 U/L (ref 50–136)
ALT SERPL-CCNC: 7 U/L (ref 12–65)
ANION GAP SERPL CALC-SCNC: 6 MMOL/L (ref 7–16)
AST SERPL-CCNC: 12 U/L (ref 15–37)
BILIRUB SERPL-MCNC: 0.4 MG/DL (ref 0.2–1.1)
BLD PROD TYP BPU: NORMAL
BLD PROD TYP BPU: NORMAL
BLOOD GROUP ANTIBODIES SERPL: NORMAL
BPU ID: NORMAL
BPU ID: NORMAL
BUN SERPL-MCNC: 8 MG/DL (ref 8–23)
CALCIUM SERPL-MCNC: 8.6 MG/DL (ref 8.3–10.4)
CHLORIDE SERPL-SCNC: 105 MMOL/L (ref 98–107)
CO2 SERPL-SCNC: 28 MMOL/L (ref 21–32)
CREAT SERPL-MCNC: 0.5 MG/DL (ref 0.6–1)
CROSSMATCH RESULT,%XM: NORMAL
CROSSMATCH RESULT,%XM: NORMAL
ERYTHROCYTE [DISTWIDTH] IN BLOOD BY AUTOMATED COUNT: 14.6 % (ref 11.9–14.6)
GLOBULIN SER CALC-MCNC: 3.3 G/DL (ref 2.3–3.5)
GLUCOSE SERPL-MCNC: 94 MG/DL (ref 65–100)
HCT VFR BLD AUTO: 31.9 % (ref 35.8–46.3)
HGB BLD-MCNC: 10.6 G/DL (ref 11.7–15.4)
MCH RBC QN AUTO: 30.1 PG (ref 26.1–32.9)
MCHC RBC AUTO-ENTMCNC: 33.2 G/DL (ref 31.4–35)
MCV RBC AUTO: 90.6 FL (ref 79.6–97.8)
NRBC # BLD: 0 K/UL (ref 0–0.2)
PLATELET # BLD AUTO: 264 K/UL (ref 150–450)
PMV BLD AUTO: 9.9 FL (ref 9.4–12.3)
POTASSIUM SERPL-SCNC: 3.6 MMOL/L (ref 3.5–5.1)
PROT SERPL-MCNC: 5.8 G/DL (ref 6.3–8.2)
RBC # BLD AUTO: 3.52 M/UL (ref 4.05–5.2)
SODIUM SERPL-SCNC: 139 MMOL/L (ref 136–145)
SPECIMEN EXP DATE BLD: NORMAL
STATUS OF UNIT,%ST: NORMAL
STATUS OF UNIT,%ST: NORMAL
UNIT DIVISION, %UDIV: 0
UNIT DIVISION, %UDIV: 0
WBC # BLD AUTO: 10.5 K/UL (ref 4.3–11.1)

## 2022-03-06 PROCEDURE — 65270000029 HC RM PRIVATE

## 2022-03-06 PROCEDURE — 99231 SBSQ HOSP IP/OBS SF/LOW 25: CPT | Performed by: OBSTETRICS & GYNECOLOGY

## 2022-03-06 PROCEDURE — 74011000250 HC RX REV CODE- 250: Performed by: ORTHOPAEDIC SURGERY

## 2022-03-06 PROCEDURE — 74011250637 HC RX REV CODE- 250/637: Performed by: INTERNAL MEDICINE

## 2022-03-06 PROCEDURE — 80053 COMPREHEN METABOLIC PANEL: CPT

## 2022-03-06 PROCEDURE — 74011250637 HC RX REV CODE- 250/637: Performed by: PHYSICIAN ASSISTANT

## 2022-03-06 PROCEDURE — 85027 COMPLETE CBC AUTOMATED: CPT

## 2022-03-06 PROCEDURE — 97530 THERAPEUTIC ACTIVITIES: CPT

## 2022-03-06 PROCEDURE — 74011250637 HC RX REV CODE- 250/637: Performed by: SPECIALIST/TECHNOLOGIST

## 2022-03-06 PROCEDURE — 76856 US EXAM PELVIC COMPLETE: CPT

## 2022-03-06 PROCEDURE — 74011250637 HC RX REV CODE- 250/637: Performed by: ORTHOPAEDIC SURGERY

## 2022-03-06 PROCEDURE — 36415 COLL VENOUS BLD VENIPUNCTURE: CPT

## 2022-03-06 RX ORDER — SIMETHICONE 80 MG
80 TABLET,CHEWABLE ORAL
Status: DISCONTINUED | OUTPATIENT
Start: 2022-03-06 | End: 2022-03-07 | Stop reason: HOSPADM

## 2022-03-06 RX ORDER — FOLIC ACID 1 MG/1
1 TABLET ORAL DAILY
Status: DISCONTINUED | OUTPATIENT
Start: 2022-03-06 | End: 2022-03-07 | Stop reason: HOSPADM

## 2022-03-06 RX ORDER — LANOLIN ALCOHOL/MO/W.PET/CERES
1000 CREAM (GRAM) TOPICAL DAILY
Status: DISCONTINUED | OUTPATIENT
Start: 2022-03-06 | End: 2022-03-07 | Stop reason: HOSPADM

## 2022-03-06 RX ADMIN — HYDROMORPHONE HYDROCHLORIDE 2 MG: 2 TABLET ORAL at 21:27

## 2022-03-06 RX ADMIN — SODIUM CHLORIDE, PRESERVATIVE FREE 10 ML: 5 INJECTION INTRAVENOUS at 05:18

## 2022-03-06 RX ADMIN — Medication 1 AMPULE: at 08:28

## 2022-03-06 RX ADMIN — DOCUSATE SODIUM 100 MG: 100 CAPSULE, LIQUID FILLED ORAL at 08:29

## 2022-03-06 RX ADMIN — ZOLPIDEM TARTRATE 10 MG: 5 TABLET ORAL at 00:11

## 2022-03-06 RX ADMIN — PREGABALIN 75 MG: 75 CAPSULE ORAL at 21:28

## 2022-03-06 RX ADMIN — DOCUSATE SODIUM 100 MG: 100 CAPSULE, LIQUID FILLED ORAL at 15:52

## 2022-03-06 RX ADMIN — MEDROXYPROGESTERONE ACETATE 5 MG: 10 TABLET ORAL at 08:28

## 2022-03-06 RX ADMIN — CYANOCOBALAMIN TAB 1000 MCG 1000 MCG: 1000 TAB at 08:29

## 2022-03-06 RX ADMIN — PREGABALIN 75 MG: 75 CAPSULE ORAL at 15:52

## 2022-03-06 RX ADMIN — SIMETHICONE 80 MG: 80 TABLET, CHEWABLE ORAL at 15:52

## 2022-03-06 RX ADMIN — SODIUM CHLORIDE, PRESERVATIVE FREE 5 ML: 5 INJECTION INTRAVENOUS at 13:17

## 2022-03-06 RX ADMIN — BUSPIRONE HYDROCHLORIDE 15 MG: 10 TABLET ORAL at 08:28

## 2022-03-06 RX ADMIN — FOLIC ACID 1 MG: 1 TABLET ORAL at 08:29

## 2022-03-06 RX ADMIN — CELECOXIB 100 MG: 100 CAPSULE ORAL at 15:52

## 2022-03-06 RX ADMIN — HYDROMORPHONE HYDROCHLORIDE 2 MG: 2 TABLET ORAL at 00:44

## 2022-03-06 RX ADMIN — AMLODIPINE BESYLATE 5 MG: 5 TABLET ORAL at 08:29

## 2022-03-06 RX ADMIN — HYDROMORPHONE HYDROCHLORIDE 2 MG: 2 TABLET ORAL at 04:43

## 2022-03-06 RX ADMIN — HYDROMORPHONE HYDROCHLORIDE 2 MG: 2 TABLET ORAL at 08:29

## 2022-03-06 RX ADMIN — PANTOPRAZOLE SODIUM 40 MG: 40 TABLET, DELAYED RELEASE ORAL at 08:29

## 2022-03-06 RX ADMIN — PREGABALIN 75 MG: 75 CAPSULE ORAL at 08:28

## 2022-03-06 RX ADMIN — BUSPIRONE HYDROCHLORIDE 15 MG: 10 TABLET ORAL at 15:52

## 2022-03-06 RX ADMIN — ESTROGENS, CONJUGATED 1.25 MG: 0.62 TABLET, FILM COATED ORAL at 08:29

## 2022-03-06 RX ADMIN — HYDROMORPHONE HYDROCHLORIDE 2 MG: 2 TABLET ORAL at 13:23

## 2022-03-06 RX ADMIN — BUPROPION HYDROCHLORIDE 150 MG: 150 TABLET, EXTENDED RELEASE ORAL at 15:52

## 2022-03-06 RX ADMIN — BUPROPION HYDROCHLORIDE 150 MG: 150 TABLET, EXTENDED RELEASE ORAL at 08:28

## 2022-03-06 RX ADMIN — SODIUM CHLORIDE, PRESERVATIVE FREE 5 ML: 5 INJECTION INTRAVENOUS at 21:34

## 2022-03-06 RX ADMIN — Medication 1 AMPULE: at 21:28

## 2022-03-06 RX ADMIN — ZOLPIDEM TARTRATE 10 MG: 5 TABLET ORAL at 22:56

## 2022-03-06 RX ADMIN — CELECOXIB 100 MG: 100 CAPSULE ORAL at 08:29

## 2022-03-06 RX ADMIN — VALSARTAN 160 MG: 80 TABLET ORAL at 08:28

## 2022-03-06 NOTE — PROGRESS NOTES
Reviewed labs and [address] essentially unchanged since office US in 6/21 with thin 3.3 mm lining cc  Postmenopausal state  No intervention needed  PER RN no further bleeding   Hg stable after 2 units  Plan to follow up in office unless consulting physician prefers in hospital consult- have [address] [facility] consulting physician

## 2022-03-06 NOTE — PROGRESS NOTES
Problem: Falls - Risk of  Goal: *Absence of Falls  Description: Document Paula Park Fall Risk and appropriate interventions in the flowsheet.   Outcome: Progressing Towards Goal  Note: Fall Risk Interventions:  Mobility Interventions: Communicate number of staff needed for ambulation/transfer,Patient to call before getting OOB         Medication Interventions: Patient to call before getting OOB,Teach patient to arise slowly    Elimination Interventions: Call light in reach    History of Falls Interventions: Bed/chair exit alarm         Problem: Pain  Goal: *Control of Pain  Outcome: Progressing Towards Goal

## 2022-03-06 NOTE — PROGRESS NOTES
ACUTE PHYSICAL THERAPY GOALS:  (Developed with and agreed upon by patient and/or caregiver. )  LTG:  (1.)Ms. Gail Green will move from supine to sit and sit to supine, scoot up and down and roll side to side in bed with INDEPENDENCE via log roll within 7 treatment day(s). (2.)Ms. Gail Green will transfer from bed to chair and chair to bed with MODIFIED INDEPENDENCE using the least restrictive device within 7 treatment day(s). GOAL MET 3/6/2022    (3.)Ms. Gail Green will ambulate with SUPERVISION for 300+ feet with the least restrictive device within 7 treatment day(s). GOAL MET 3/5/2022    (4.)Ms. Gail Green will independently verbalize 3/3 post op spinal precautions and maintain compliance within 7 days. GOAL MET 3/6/2022      PHYSICAL THERAPY: Daily Note and PM Treatment Day # 3    Tosha Gonzalez is a 74 y.o. female   PRIMARY DIAGNOSIS: <principal problem not specified>  Lumbar stenosis with neurogenic claudication [M48.062]  Spinal stenosis [M48.00]  Procedure(s) (LRB):  L2-L3 DLIF (N/A)  L2-L3 LAMI FUSION/ L2-L5 REVISION INSTRUMENTATION, REDO RIGHT L3-4 LAMI/DISC (N/A)  3 Days Post-Op    ASSESSMENT:     REHAB RECOMMENDATIONS: CURRENT LEVEL OF FUNCTION:  (Most Recently Demonstrated)   Recommendation to date pending progress:  Setting:   No further skilled therapy after discharge from hospital  Equipment:    None Bed Mobility:   Modified Independent  Sit to Stand:   Modified Independent  Transfers:   Modified Independent  Gait/Mobility:   Modified Independent     ASSESSMENT:  Ms. Gail Green is supine and ready to get up. She got to the EOB without my  Help and walked a full lap of the floor without the walker and SBA. She walked another half lap with the walker. Left up in the chair. Doing well and functionally ready to go home. PM: patient is sitting up from a flat bed using railing (she has adjustable bed at home but no railing)  She walked 2 full laps of the hallway with the walker doing great.   Plans to go home tomorrow if hgb remains stable. If she is still here tomorrow may work on bed mobility without railing as she lives alone. SUBJECTIVE:   Ms. Burt Hartman states, \"I am feeling better\"    SOCIAL HISTORY/ LIVING ENVIRONMENT: Lives alone. Independent at baseline without DME use.   Home Environment: Private residence  # Steps to Enter: 2  One/Two Story Residence: Two story, live on 1st floor  Living Alone: Yes  Support Systems: Other Family Member(s)  OBJECTIVE:     PAIN: VITAL SIGNS: LINES/DRAINS:   Pre Treatment: Pain Screen  Pain Scale 1: FLACC  Pain Intensity 1: 0  Post Treatment: 5/10 (RN notified)   Hemovac  O2 Device: None (Room air)     MOBILITY: I Mod I S SBA CGA Min Mod Max Total  NT x2 Comments:   Bed Mobility    Rolling [] [x] [] [] [] [] [] [] [] [] []    Supine to Sit [] [x] [] [] [] [] [] [] [] [] []    Scooting [] [x] [] [] [] [] [] [] [] [] []    Sit to Supine [] [x] [] [] [] [] [] [] [] [] []    Transfers    Sit to Stand [] [x] [] [] [] [] [] [] [] [] []    Bed to Chair [] [x] [] [] [] [] [] [] [] [] []    Stand to Sit [] [x] [] [] [] [] [] [] [] [] []    I=Independent, Mod I=Modified Independent, S=Supervision, SBA=Standby Assistance, CGA=Contact Guard Assistance,   Min=Minimal Assistance, Mod=Moderate Assistance, Max=Maximal Assistance, Total=Total Assistance, NT=Not Tested    BALANCE: Good Fair+ Fair Fair- Poor NT Comments   Sitting Static [x] [] [] [] [] []    Sitting Dynamic [x] [] [] [] [] []              Standing Static [] [x] [] [] [] []    Standing Dynamic [] [x] [] [] [] []      GAIT: I Mod I S SBA CGA Min Mod Max Total  NT x2 Comments:   Level of Assistance [] [x] [x] [] [] [] [] [] [] [] []    Distance 1000 ft    DME Rolling Walker    Gait Quality Slow, cautious    Weightbearing  Status N/A     I=Independent, Mod I=Modified Independent, S=Supervision, SBA=Standby Assistance, CGA=Contact Guard Assistance,   Min=Minimal Assistance, Mod=Moderate Assistance, Max=Maximal Assistance, Total=Total Assistance, NT=Not Tested    PLAN:   FREQUENCY/DURATION: PT Plan of Care: BID for duration of hospital stay or until stated goals are met, whichever comes first.  TREATMENT:     TREATMENT:   ($$ Therapeutic Activity: 8-22 mins    )  Therapeutic Activity (15 Minutes): Therapeutic activity included Rolling, Supine to Sit, Scooting, Transfer Training and Ambulation on level ground to improve functional Mobility, Strength and Activity tolerance.     TREATMENT GRID:  N/A    AFTER TREATMENT POSITION/PRECAUTIONS:  Chair, Needs within reach and RN notified    INTERDISCIPLINARY COLLABORATION:  RN/PCT and PT/PTA    TOTAL TREATMENT DURATION:  PT Patient Time In/Time Out  Time In: 1445  Time Out: [address], PTA

## 2022-03-06 NOTE — ROUTINE PROCESS
Bedside and Verbal shift change report given to Rochelle Jones (oncoming nurse) by myself (offgoing nurse). Report included the following information SBAR, Kardex, Intake/Output, MAR and Recent Results.

## 2022-03-06 NOTE — PROGRESS NOTES
Hospitalist History and Physical   Admit Date:  3/3/2022  6:23 AM   Name:  Ron Russo   Age:  74 y.o. Sex:  female  :  1955   MRN:  [id number]   Room:  [facility]/    Presenting Complaint: No chief complaint on file. Reason(s) for Admission: Lumbar stenosis with neurogenic claudication [M48.062]  Spinal stenosis [M48.00]     History of Present Illness:   Ron Russo is a 74 y.o. female with medical history of  chronic pain, anxiety, post menstrual bleeding admitted to Ortho service and is status post    L2-L3 DLIF (N/A)  L2-L3 LAMI FUSION/ L2-L5 REVISION INSTRUMENTATION, REDO RIGHT L3-4 LAMI/DISC (N/A)        For lumbar stenosis. Today, medicine service consulted for patient hemoglobin of 5.9. Anemia: Patient has intermittent episodes of anemia. As per patient she has history of post menstrual bleeding and yesterday she had large amount of vaginal bleeding which was evidenced by labs. Per patient, she had Pap smear twice in 2 ultrasound with no obvious cause. She follow-up with gynecology as outpatient. Denies any bleeding in her stool or urine. Feeling slightly weak but better than yesterday. As per patient yesterday she was in pain. : Denies any bleeding from anywhere at present. Feeling better than yesterday still has some pain in her back. Denies any chest pain, palpitation, nausea or vomiting. Rest review system negative except mentioned above. Next    Assessment & Plan: Active Problems:    Anemia: Patient hemoglobin 5.9. Vaginal bleeding has stopped per patient. -Iron studies, vitamin B12 and folate ordered. Discussed with the nurse to added to the morning sample.  -2 unit PRBC ordered.  -1 dose of Lasix in between transfusion.  -Nurse to notify on-call MD with posttransfusion CBC results.  -Pelvic ultrasound ordered. -Gynecology consulted.  -I have held patient's IV fluid as patient is tolerating p.o. without any issues. -SCDs for DVT prophylaxis.     March 6: Hemoglobin responded code with blood transfusion. Blood work was after transfusion but still showed low vitamin A47 and folic acid. Started on folate and vitamin B12. Will need evaluation of iron studies again later but patient is already on iron supplementation at home. Will resume iron supplementation in next 1 or 2 days. I have received call from OB/GYN on-call and they recommended outpatient follow-up unless patient started bleeding again. Need for OB/GYN consult again if patient start bleeding again. OB/GYN medicine service has no contraindication at present from their perspective for DVT prophylaxis. DVT prophylaxis choice as per primary team.    Spinal stenosis and other issues: Per primary team.    Rest management per primary team.    Thanks for consultation. We will continue to follow along on daily basis. Please notify on-call medicine physician with questions or if patient clinical condition changes.      Hospital Problems as of 3/6/2022 Date Reviewed: 2/21/2022          Codes Class Noted - Resolved POA    Spondylolisthesis at L2-L3 level ICD-10-CM: M43.16  ICD-9-CM: 756.12  3/3/2022 - Present Yes        Spinal stenosis ICD-10-CM: M48.00  ICD-9-CM: 724.00  3/3/2022 - Present Unknown        Spinal stenosis of lumbar region at multiple levels ICD-10-CM: M48.061  ICD-9-CM: 724.02  1/7/2019 - Present Yes                Objective:     Patient Vitals for the past 24 hrs:   Temp Pulse Resp BP SpO2   03/06/22 0743 98.2 °F (36.8 °C) 88 16 (!) 122/50 96 %   03/06/22 0308 98.3 °F (36.8 °C) 80 14 (!) 119/49 95 %   03/05/22 2300 98 °F (36.7 °C) 76 15 (!) 116/50 97 %   03/05/22 1908 97.8 °F (36.6 °C) 87 16 (!) 131/51 98 %   03/05/22 1641 98.3 °F (36.8 °C) 97 16 (!) 143/59 99 %   03/05/22 1544 98.3 °F (36.8 °C) 84 16 131/63 96 %     Oxygen Therapy  O2 Sat (%): 96 % (03/06/22 0743)  Pulse via Oximetry: 87 beats per minute (03/03/22 1536)  O2 Device: None (Room air) (03/06/22 0743)  O2 Flow Rate (L/min): 3 l/min (03/03/22 1604)    Estimated body mass index is 24.02 kg/m² as calculated from the following:    Height as of this encounter: 5' 3\" (1.6 m). Weight as of this encounter: 61.5 kg (135 lb 9.6 oz). Intake/Output Summary (Last 24 hours) at 3/6/2022 1328  Last data filed at 3/6/2022 0443  Gross per 24 hour   Intake 1076 ml   Output 70 ml   Net 1006 ml         Physical Exam:     Blood pressure (!) 122/50, pulse 88, temperature 98.2 °F (36.8 °C), resp. rate 16, height 5' 3\" (1.6 m), weight 61.5 kg (135 lb 9.6 oz), last menstrual period 10/30/2017, SpO2 96 %. General:    Well nourished. No overt distress  Head:  Normocephalic, atraumatic  Eyes:  Extraocular muscle seems intact  ENT:  Mucous membrane not dry  Neck:  Supple  CV:   RRR. No m/r/g. No jugular venous distension. Lungs:   CTAB. No wheezing, rhonchi, or rales. Respirations even, unlabored  Abdomen: Bowel sounds present. Soft, nontender, nondistended. Back: Wound VAC present  Extremities: No cyanosis or clubbing. No edema  Skin:     No rashes and normal coloration. Warm and dry. Neuro:  AOx3, moving all 4 extremities, speech normal  Psych:  Normal mood and affect.       I have reviewed ordered lab tests and independently visualized imaging below:    Last 24hr Labs:    Procedures done this admission:  Procedure(s):  L2-L3 DLIF  L2-L3 LAMI FUSION/ L2-L5 REVISION INSTRUMENTATION, REDO RIGHT L3-4 LAMI/DISC    All Micro Results     None          SARS-CoV-2 Lab Results  \"Novel Coronavirus\" Test: No results found for: COV2NT   \"Emergent Disease\" Test: No results found for: EDPR  \"SARS-COV-2\" Test: No results found for: XGCOVT  \"Precision Labs\" Test: No results found for: RSLT  Rapid Test: No results found for: COVR         Labs: Results:       BMP, Mg, Phos Recent Labs     03/06/22  0422 03/04/22  0502    140   K 3.6 4.0    108*   CO2 28 27   AGAP 6* 5*   BUN 8 4*   CREA 0.50* 0.60   CA 8.6 8.0*   GLU 94 100      CBC Recent Labs 03/06/22  0422 03/05/22  1657 03/05/22 0442   WBC 10.5 12.5* 8.8   RBC 3.52* 4.00* 1.95*   HGB 10.6* 12.0 5.9*   HCT 31.9* 36.0 19.0*    282 199   GRANS  --  68  --    LYMPH  --  23  --    EOS  --  1  --    MONOS  --  7  --    BASOS  --  0  --    IG  --  1  --    ANEU  --  8.5*  --    ABL  --  2.9  --    DIMPLE  --  0.1  --    ABM  --  0.9  --    ABB  --  0.0  --    AIG  --  0.1  --       LFT Recent Labs     03/06/22 0422   ALT 7*   AP 47*   TP 5.8*   ALB 2.5*   GLOB 3.3   AGRAT 0.8*      Cardiac Testing No results found for: BNPP, BNP, CPK, RCK1, RCK2, RCK3, RCK4, CKMB, CKNDX, CKND1, TROPT, TROIQ   Coagulation Tests No results found for: PTP, INR, APTT, INREXT, INREXT   A1c No results found for: HBA1C, BIX5FQMB, NWO4TBZF   Lipid Panel Lab Results   Component Value Date/Time    Cholesterol, total 270 (H) 02/19/2018 04:08 PM    HDL Cholesterol 110 02/19/2018 04:08 PM    LDL, calculated 112 (H) 02/19/2018 04:08 PM    VLDL, calculated 48 (H) 02/19/2018 04:08 PM    Triglyceride 239 (H) 02/19/2018 04:08 PM      Thyroid Panel Lab Results   Component Value Date/Time    TSH 1.150 03/05/2022 04:57 PM    TSH 3.460 12/10/2021 12:38 PM    T4, Free 0.9 03/05/2022 04:57 PM    T4, Free 0.99 12/10/2021 12:38 PM        Most Recent UA Lab Results   Component Value Date/Time    Color YELLOW 02/23/2022 11:28 AM    Appearance CLEAR 02/23/2022 11:28 AM    Specific gravity 1.010 02/23/2022 11:28 AM    pH (UA) 5.5 02/23/2022 11:28 AM    Protein Negative 02/23/2022 11:28 AM    Glucose Negative 02/23/2022 11:28 AM    Ketone Negative 02/23/2022 11:28 AM    Bilirubin Negative 02/23/2022 11:28 AM    Blood TRACE (A) 02/23/2022 11:28 AM    Urobilinogen 0.2 02/23/2022 11:28 AM    Nitrites Negative 02/23/2022 11:28 AM    Leukocyte Esterase Negative 02/23/2022 11:28 AM    WBC 0 02/23/2022 11:28 AM    RBC 0-3 02/23/2022 11:28 AM    Epithelial cells 0 02/23/2022 11:28 AM    Bacteria 0 02/23/2022 11:28 AM    Casts 0-3 02/23/2022 11:28 AM Other Studies:  US PELV NON OB W TV    Result Date: 3/6/2022  TRANSABDOMINAL AND TRANSVAGINAL PELVIC SONOGRAPHY CLINICAL HISTORY:  47-year-old with vaginal bleeding and anemia. COMPARISON:  CT of February 11, 2020 and MRI of January 28, 2020. FINDINGS:  Transabdominal and transvaginal images this is mildly enlarged at 9.4 x 5.5 x 5.3 cm without endometrial thickening 3.3 mm. However, there is a 2.2 cm hypoechoic myometrium mass posteriorly at the body of the uterus and a 2.2 cm partially calcified mass anteriorly at the fundus consistent with leiomyomata. Nabothian cysts are incidentally noted at the cervix. Neither ovary could be confidently identified despite transvaginal scanning, which is not unexpected after menopause. No adnexal mass or abnormal fluid collection is identified. Mildly enlarged, myomatous uterus without significant endometrial thickening. Signed:  Hannah Joseph MD    Part of this note may have been written by using a voice dictation software. The note has been proof read but may still contain some grammatical/other typographical errors.

## 2022-03-06 NOTE — PROGRESS NOTES
2022         Post Op day: 3 Days Post-Op     Admit Date: 3/3/2022  Admit Diagnosis: Lumbar stenosis with neurogenic claudication [M48.062]  Spinal stenosis [M48.00]        Subjective: Patient is status-post Procedure(s) (LRB):  L2-L3 DLIF (N/A)  L2-L3 LAMI FUSION/ L2-L5 REVISION INSTRUMENTATION, REDO RIGHT L3-4 LAMI/DISC (N/A). Patient doing well. No concerns/complaints. No shortness of breath, chest pain or nausea/vomiting. Pt. Was ambulating with PT and just returned to room. Objective:   Visit Vitals  BP (!) 122/50 (BP 1 Location: Left upper arm, BP Patient Position: Supine)   Pulse 88   Temp 98.2 °F (36.8 °C)   Resp 16   Ht 5' 3\" (1.6 m)   Wt 135 lb 9.6 oz (61.5 kg)   SpO2 96%   BMI 24.02 kg/m²    Temp (24hrs), Av.2 °F (36.8 °C), Min:97.8 °F (36.6 °C), Max:98.3 °F (36.8 °C)    Lab Results   Component Value Date/Time    HGB 10.6 (L) 2022 04:22 AM     Extremity Exam  Dressing Clean, dry, intact.   Neuro intact and unchanged bilateral extremity  Sensation intact to light touch  Extremities perfused  No sign of DVT     Assessment / Plan :  S/p Procedure(s) (LRB):  L2-L3 DLIF (N/A)  L2-L3 LAMI FUSION/ L2-L5 REVISION INSTRUMENTATION, REDO RIGHT L3-4 LAMI/DISC (N/A)  Continue current postoperative plan  PT/OT-Continue current weightbearing status and restrictions of involved extremities  Continue current VTE prophylaxis  DIspo-when drain output is less than 50 cc  Patient Active Problem List   Diagnosis Code    Hypertension I10    Fatigue R53.83    Gastro-esophageal reflux disease with esophagitis K21.00    Neck pain M54.2    Sinus congestion R09.81    Microhematuria R31.29    B12 deficiency E53.8    Renal cyst, congenital, right Q61.00    Depression with anxiety F41.8    Hx of fracture of pelvis Z87.81    Back pain M54.9    Post-viral cough syndrome R05.8    Chronic pelvic pain in female R10.2, G89.29    Chronic narcotic use F11.90    Other chronic pain G89.29    Cervical spinal stenosis M48.02    Cervical radiculopathy at C5 M54.12    Spinal stenosis of lumbar region at multiple levels M48.061    Acute blood loss as cause of postoperative anemia D62    DDD (degenerative disc disease), lumbar M51.36    Numbness and tingling R20.0, R20.2    Pain in both hands M79.641, M79.642    Neuropathy G62.9    Weakness of both legs R29.898    Postural imbalance R29.3    Numbness and tingling of both feet R20.0, R20.2    Spondylolisthesis at L2-L3 level M43.16    Spinal stenosis M48.00          Signed By: Clinton Good NP

## 2022-03-06 NOTE — PROGRESS NOTES
ACUTE PHYSICAL THERAPY GOALS:  (Developed with and agreed upon by patient and/or caregiver. )  LTG:  (1.)Ms. Carol Alvarado will move from supine to sit and sit to supine, scoot up and down and roll side to side in bed with INDEPENDENCE via log roll within 7 treatment day(s). (2.)Ms. Carol Alvarado will transfer from bed to chair and chair to bed with MODIFIED INDEPENDENCE using the least restrictive device within 7 treatment day(s). (3.)Ms. Carol Alvarado will ambulate with SUPERVISION for 300+ feet with the least restrictive device within 7 treatment day(s). GOAL MET 3/5/2022    (4.)Ms. Carol Alvarado will independently verbalize 3/3 post op spinal precautions and maintain compliance within 7 days. PHYSICAL THERAPY: Daily Note and AM Treatment Day # 3    Rosy Fuentes is a 74 y.o. female   PRIMARY DIAGNOSIS: <principal problem not specified>  Lumbar stenosis with neurogenic claudication [M48.062]  Spinal stenosis [M48.00]  Procedure(s) (LRB):  L2-L3 DLIF (N/A)  L2-L3 LAMI FUSION/ L2-L5 REVISION INSTRUMENTATION, REDO RIGHT L3-4 LAMI/DISC (N/A)  3 Days Post-Op    ASSESSMENT:     REHAB RECOMMENDATIONS: CURRENT LEVEL OF FUNCTION:  (Most Recently Demonstrated)   Recommendation to date pending progress:  [address] [place] [address] Therapy  Equipment:    None Bed Mobility:   Modified Independent  Sit to Stand:   Contact Guard Assistance  Transfers:   Standby Assistance  Gait/Mobility:   Standby Assistance     ASSESSMENT:  Ms. Carol Alvarado is supine and ready to get up. She got to the EOB without my  Help and walked a full lap of the floor without the walker and SBA. She walked another half lap with the walker. Left up in the chair. Doing well and functionally ready to go home. SUBJECTIVE:   Ms. Carol Alvarado states, \"I want to go home\"    SOCIAL HISTORY/ LIVING ENVIRONMENT: Lives alone. Independent at baseline without DME use.   Home Environment: Private residence  # Steps to Enter: 2  One/Two Story Residence: Two story, live on  floor  Living Alone: Yes  Support Systems: Other Family Member(s)  OBJECTIVE:     PAIN: VITAL SIGNS: LINES/DRAINS:   Pre Treatment: Pain Screen  Pain Scale 1: FLACC  Pain Intensity 1: 2  Post Treatment: 5/10 (RN notified)   Hemovac  O2 Device: None (Room air)     MOBILITY: I Mod I S SBA CGA Min Mod Max Total  NT x2 Comments:   Bed Mobility    Rolling [] [x] [] [] [] [] [] [] [] [] []    Supine to Sit [] [x] [] [] [] [] [] [] [] [] []    Scooting [] [x] [] [] [] [] [] [] [] [] []    Sit to Supine [] [x] [] [] [] [] [] [] [] [] []    Transfers    Sit to Stand [] [] [] [] [x] [] [] [] [] [] []    Bed to Chair [] [] [] [] [] [] [] [] [] [] []    Stand to Sit [] [] [] [x] [] [] [] [] [] [] []    I=Independent, Mod I=Modified Independent, S=Supervision, SBA=Standby Assistance, CGA=Contact Guard Assistance,   Min=Minimal Assistance, Mod=Moderate Assistance, Max=Maximal Assistance, Total=Total Assistance, NT=Not Tested    BALANCE: Good Fair+ Fair Fair- Poor NT Comments   Sitting Static [x] [] [] [] [] []    Sitting Dynamic [x] [] [] [] [] []              Standing Static [] [x] [] [] [] []    Standing Dynamic [] [x] [] [] [] []      GAIT: I Mod I S SBA CGA Min Mod Max Total  NT x2 Comments:   Level of Assistance [] [x] [x] [] [] [] [] [] [] [] [] Walked without the walker the first lap   Distance 750 ft    DME Rolling Walker    Gait Quality Slow, cautious    Weightbearing  Status N/A     I=Independent, Mod I=Modified Independent, S=Supervision, SBA=Standby Assistance, CGA=Contact Guard Assistance,   Min=Minimal Assistance, Mod=Moderate Assistance, Max=Maximal Assistance, Total=Total Assistance, NT=Not Tested    PLAN:   FREQUENCY/DURATION: PT Plan of Care: BID for duration of hospital stay or until stated goals are met, whichever comes first.  TREATMENT:     TREATMENT:   ($$ Therapeutic Activity: 23-37 mins    )  Therapeutic Activity (25 Minutes):  Therapeutic activity included Rolling, Supine to Sit, Sit to Supine, Scooting, Transfer Training, Ambulation on level ground, Sitting balance  and Standing balance to improve functional Mobility, Strength and Activity tolerance.     TREATMENT GRID:  N/A    AFTER TREATMENT POSITION/PRECAUTIONS:  Chair, Needs within reach and RN notified    INTERDISCIPLINARY COLLABORATION:  RN/PCT and PT/PTA    TOTAL TREATMENT DURATION:  PT Patient Time In/Time Out  Time In: 0830  Time Out: [address], PTA

## 2022-03-07 VITALS
RESPIRATION RATE: 18 BRPM | DIASTOLIC BLOOD PRESSURE: 62 MMHG | WEIGHT: 135.6 LBS | OXYGEN SATURATION: 98 % | TEMPERATURE: 98 F | BODY MASS INDEX: 24.03 KG/M2 | HEIGHT: 63 IN | SYSTOLIC BLOOD PRESSURE: 139 MMHG | HEART RATE: 87 BPM

## 2022-03-07 LAB
BASOPHILS # BLD: 0 K/UL (ref 0–0.2)
BASOPHILS NFR BLD: 1 % (ref 0–2)
DIFFERENTIAL METHOD BLD: ABNORMAL
EOSINOPHIL # BLD: 0.2 K/UL (ref 0–0.8)
EOSINOPHIL NFR BLD: 2 % (ref 0.5–7.8)
ERYTHROCYTE [DISTWIDTH] IN BLOOD BY AUTOMATED COUNT: 14.6 % (ref 11.9–14.6)
HCT VFR BLD AUTO: 34.8 % (ref 35.8–46.3)
HGB BLD-MCNC: 11.1 G/DL (ref 11.7–15.4)
IMM GRANULOCYTES # BLD AUTO: 0.1 K/UL (ref 0–0.5)
IMM GRANULOCYTES NFR BLD AUTO: 1 % (ref 0–5)
LYMPHOCYTES # BLD: 1.8 K/UL (ref 0.5–4.6)
LYMPHOCYTES NFR BLD: 21 % (ref 13–44)
MCH RBC QN AUTO: 29.7 PG (ref 26.1–32.9)
MCHC RBC AUTO-ENTMCNC: 31.9 G/DL (ref 31.4–35)
MCV RBC AUTO: 93 FL (ref 79.6–97.8)
MONOCYTES # BLD: 0.6 K/UL (ref 0.1–1.3)
MONOCYTES NFR BLD: 7 % (ref 4–12)
NEUTS SEG # BLD: 5.8 K/UL (ref 1.7–8.2)
NEUTS SEG NFR BLD: 68 % (ref 43–78)
NRBC # BLD: 0 K/UL (ref 0–0.2)
PLATELET # BLD AUTO: 333 K/UL (ref 150–450)
PMV BLD AUTO: 9.7 FL (ref 9.4–12.3)
RBC # BLD AUTO: 3.74 M/UL (ref 4.05–5.2)
WBC # BLD AUTO: 8.5 K/UL (ref 4.3–11.1)

## 2022-03-07 PROCEDURE — 74011250637 HC RX REV CODE- 250/637: Performed by: ORTHOPAEDIC SURGERY

## 2022-03-07 PROCEDURE — 74011250637 HC RX REV CODE- 250/637: Performed by: INTERNAL MEDICINE

## 2022-03-07 PROCEDURE — 74011250637 HC RX REV CODE- 250/637: Performed by: PHYSICIAN ASSISTANT

## 2022-03-07 PROCEDURE — 85025 COMPLETE CBC W/AUTO DIFF WBC: CPT

## 2022-03-07 PROCEDURE — 36415 COLL VENOUS BLD VENIPUNCTURE: CPT

## 2022-03-07 PROCEDURE — 74011000250 HC RX REV CODE- 250: Performed by: ORTHOPAEDIC SURGERY

## 2022-03-07 PROCEDURE — 97530 THERAPEUTIC ACTIVITIES: CPT

## 2022-03-07 RX ORDER — LANOLIN ALCOHOL/MO/W.PET/CERES
1 CREAM (GRAM) TOPICAL
Status: DISCONTINUED | OUTPATIENT
Start: 2022-03-08 | End: 2022-03-07 | Stop reason: HOSPADM

## 2022-03-07 RX ORDER — HYDROMORPHONE HYDROCHLORIDE 2 MG/1
2 TABLET ORAL
Qty: 28 TABLET | Refills: 0 | Status: SHIPPED | OUTPATIENT
Start: 2022-03-07 | End: 2022-03-14

## 2022-03-07 RX ADMIN — ESTROGENS, CONJUGATED 1.25 MG: 0.62 TABLET, FILM COATED ORAL at 08:48

## 2022-03-07 RX ADMIN — VALSARTAN 160 MG: 80 TABLET ORAL at 08:46

## 2022-03-07 RX ADMIN — AMLODIPINE BESYLATE 5 MG: 5 TABLET ORAL at 08:46

## 2022-03-07 RX ADMIN — Medication 1 AMPULE: at 08:47

## 2022-03-07 RX ADMIN — HYDROMORPHONE HYDROCHLORIDE 2 MG: 2 TABLET ORAL at 10:36

## 2022-03-07 RX ADMIN — HYDROMORPHONE HYDROCHLORIDE 2 MG: 2 TABLET ORAL at 06:09

## 2022-03-07 RX ADMIN — CELECOXIB 100 MG: 100 CAPSULE ORAL at 08:47

## 2022-03-07 RX ADMIN — BUSPIRONE HYDROCHLORIDE 15 MG: 10 TABLET ORAL at 08:46

## 2022-03-07 RX ADMIN — PREGABALIN 75 MG: 75 CAPSULE ORAL at 15:44

## 2022-03-07 RX ADMIN — FOLIC ACID 1 MG: 1 TABLET ORAL at 08:46

## 2022-03-07 RX ADMIN — PANTOPRAZOLE SODIUM 40 MG: 40 TABLET, DELAYED RELEASE ORAL at 08:46

## 2022-03-07 RX ADMIN — MEDROXYPROGESTERONE ACETATE 5 MG: 10 TABLET ORAL at 08:44

## 2022-03-07 RX ADMIN — BUPROPION HYDROCHLORIDE 150 MG: 150 TABLET, EXTENDED RELEASE ORAL at 08:46

## 2022-03-07 RX ADMIN — SODIUM CHLORIDE, PRESERVATIVE FREE 10 ML: 5 INJECTION INTRAVENOUS at 14:06

## 2022-03-07 RX ADMIN — SODIUM CHLORIDE, PRESERVATIVE FREE 5 ML: 5 INJECTION INTRAVENOUS at 06:10

## 2022-03-07 RX ADMIN — DOCUSATE SODIUM 100 MG: 100 CAPSULE, LIQUID FILLED ORAL at 08:46

## 2022-03-07 RX ADMIN — CYANOCOBALAMIN TAB 1000 MCG 1000 MCG: 1000 TAB at 08:45

## 2022-03-07 RX ADMIN — PREGABALIN 75 MG: 75 CAPSULE ORAL at 08:47

## 2022-03-07 RX ADMIN — HYDROMORPHONE HYDROCHLORIDE 2 MG: 2 TABLET ORAL at 15:44

## 2022-03-07 NOTE — PROGRESS NOTES
Hospitalist History and Physical   Admit Date:  3/3/2022  6:23 AM   Name:  Jonathan Salvador   Age:  74 y.o. Sex:  female  :  1955   MRN:  [id number]   Room:  [facility]/    Presenting Complaint: No chief complaint on file. Reason(s) for Admission: Lumbar stenosis with neurogenic claudication [M48.062]  Spinal stenosis [M48.00]     History of Present Illness:   Jonathan Salvador is a 74 y.o. female with medical history of  chronic pain, anxiety, post menstrual bleeding admitted to Ortho service and is status post    L2-L3 DLIF (N/A)  L2-L3 LAMI FUSION/ L2-L5 REVISION INSTRUMENTATION, REDO RIGHT L3-4 LAMI/DISC (N/A)        For lumbar stenosis. Today, medicine service consulted for patient hemoglobin of 5.9. Anemia: Patient has intermittent episodes of anemia. As per patient she has history of post menstrual bleeding and yesterday she had large amount of vaginal bleeding which was evidenced by labs. Per patient, she had Pap smear twice in 2 ultrasound with no obvious cause. She follow-up with gynecology as outpatient. Denies any bleeding in her stool or urine. Feeling slightly weak but better than yesterday. As per patient yesterday she was in pain. : Denies any bleeding from anywhere at present. Feeling better than yesterday still has some pain in her back. Denies any chest pain, palpitation, nausea or vomiting. : Patient is feeling much better. Pain improved. Denies any nausea, vomiting, chest pain or palpitations. Denies any constipation. Rest review system negative except mentioned above. Assessment & Plan: Active Problems:    Anemia: Patient hemoglobin 5.9. Vaginal bleeding has stopped per patient. -Iron studies, vitamin B12 and folate ordered.   Discussed with the nurse to added to the morning sample.  -2 unit PRBC ordered.  -1 dose of Lasix in between transfusion.  -Nurse to notify on-call MD with posttransfusion CBC results.  -Pelvic ultrasound ordered. -Gynecology consulted.  -I have held patient's IV fluid as patient is tolerating p.o. without any issues. -SCDs for DVT prophylaxis. March 6: Hemoglobin responded code with blood transfusion. Blood work was after transfusion but still showed low vitamin L91 and folic acid. Started on folate and vitamin B12. Will need evaluation of iron studies again later but patient is already on iron supplementation at home. Will resume iron supplementation in next 1 or 2 days. I have received call from OB/GYN on-call and they recommended outpatient follow-up unless patient started bleeding again. Need for OB/GYN consult again if patient start bleeding again. OB/GYN medicine service has no contraindication at present from their perspective for DVT prophylaxis. DVT prophylaxis choice as per primary team.    March 7: Patient hemoglobin stable. Continue vitamin B12 folate supplementation. Ferrous sulfate added. Gynecology recommended continuing home hormone therapy. There is no contraindication for DVT prophylaxis from medicine and OB/GYN perspective. Choice of DVT prophylaxis per primary team.    Spinal stenosis and other issues: Per primary team.    Rest management per primary team.    Thanks for consultation. We will continue to follow along on daily basis. Please notify on-call medicine physician with questions or if patient clinical condition changes. If patient goes home then she needs prescription of vitamin B12, folate and ferrous sulfate. Please notify on-call medicine provider if medicine team needs to write prescription of her above mentioned medications.     Hospital Problems as of 3/7/2022 Date Reviewed: 2/21/2022          Codes Class Noted - Resolved POA    Spondylolisthesis at L2-L3 level ICD-10-CM: M43.16  ICD-9-CM: 756.12  3/3/2022 - Present Yes        Spinal stenosis ICD-10-CM: M48.00  ICD-9-CM: 724.00  3/3/2022 - Present Unknown        Spinal stenosis of lumbar region at multiple levels ICD-10-CM: M48.061  ICD-9-CM: 724.02  1/7/2019 - Present Yes                Objective:     Patient Vitals for the past 24 hrs:   Temp Pulse Resp BP SpO2   03/07/22 1201 98 °F (36.7 °C) (!) 103 18 (!) 112/55 96 %   03/07/22 0720 98.4 °F (36.9 °C) 77 17 128/68 95 %   03/07/22 0431 97.3 °F (36.3 °C) 77 18 134/66 98 %   03/07/22 0057 97.8 °F (36.6 °C) 74 18 (!) 124/53 98 %   03/06/22 2043 97.9 °F (36.6 °C) 76 18 (!) 131/58 98 %   03/06/22 1618 97.7 °F (36.5 °C) 89 16 130/60 99 %     Oxygen Therapy  O2 Sat (%): 96 % (03/07/22 1201)  Pulse via Oximetry: 87 beats per minute (03/03/22 1536)  O2 Device: None (Room air) (03/07/22 0720)  O2 Flow Rate (L/min): 3 l/min (03/03/22 1604)    Estimated body mass index is 24.02 kg/m² as calculated from the following:    Height as of this encounter: 5' 3\" (1.6 m). Weight as of this encounter: 61.5 kg (135 lb 9.6 oz). Intake/Output Summary (Last 24 hours) at 3/7/2022 1322  Last data filed at 3/7/2022 0911  Gross per 24 hour   Intake 180 ml   Output 113 ml   Net 67 ml         Physical Exam:     Blood pressure (!) 112/55, pulse (!) 103, temperature 98 °F (36.7 °C), resp. rate 18, height 5' 3\" (1.6 m), weight 61.5 kg (135 lb 9.6 oz), last menstrual period 10/30/2017, SpO2 96 %. General:    Well nourished. No overt distress  Head:  Normocephalic, atraumatic  Eyes:  Extraocular muscle seems intact  ENT:  Mucous membrane not dry  Neck:  Supple  CV:   RRR. No m/r/g. No jugular venous distension. Lungs:   CTAB. No wheezing, rhonchi, or rales. Respirations even, unlabored  Abdomen: Bowel sounds present. Soft, nontender, nondistended. Back: Wound VAC present  Extremities: No cyanosis or clubbing. No edema  Skin:     No rashes and normal coloration. Warm and dry. Neuro:  AOx3, moving all 4 extremities, speech normal  Psych:  Normal mood and affect.       I have reviewed ordered lab tests and independently visualized imaging below:    Last 24hr Labs:    Procedures done this admission:  Procedure(s):  L2-L3 DLIF  L2-L3 LAMI FUSION/ L2-L5 REVISION INSTRUMENTATION, REDO RIGHT L3-4 LAMI/DISC    All Micro Results     None          SARS-CoV-2 Lab Results  \"Novel Coronavirus\" Test: No results found for: COV2NT   \"Emergent Disease\" Test: No results found for: EDPR  \"SARS-COV-2\" Test: No results found for: XGCOVT  \"Precision Labs\" Test: No results found for: RSLT  Rapid Test: No results found for: COVR         Labs: Results:       BMP, Mg, Phos Recent Labs     03/06/22 0422      K 3.6      CO2 28   AGAP 6*   BUN 8   CREA 0.50*   CA 8.6   GLU 94      CBC Recent Labs     03/07/22  0908 03/06/22  0422 03/05/22  1657   WBC 8.5 10.5 12.5*   RBC 3.74* 3.52* 4.00*   HGB 11.1* 10.6* 12.0   HCT 34.8* 31.9* 36.0    264 282   GRANS 68  --  68   LYMPH 21  --  23   EOS 2  --  1   MONOS 7  --  7   BASOS 1  --  0   IG 1  --  1   ANEU 5.8  --  8.5*   ABL 1.8  --  2.9   DIMPLE 0.2  --  0.1   ABM 0.6  --  0.9   ABB 0.0  --  0.0   AIG 0.1  --  0.1      LFT Recent Labs     03/06/22 0422   ALT 7*   AP 47*   TP 5.8*   ALB 2.5*   GLOB 3.3   AGRAT 0.8*      Cardiac Testing No results found for: BNPP, BNP, CPK, RCK1, RCK2, RCK3, RCK4, CKMB, CKNDX, CKND1, TROPT, TROIQ   Coagulation Tests No results found for: PTP, INR, APTT, INREXT, INREXT   A1c No results found for: HBA1C, WCY1OPQG, IMN1AEFG   Lipid Panel Lab Results   Component Value Date/Time    Cholesterol, total 270 (H) 02/19/2018 04:08 PM    HDL Cholesterol 110 02/19/2018 04:08 PM    LDL, calculated 112 (H) 02/19/2018 04:08 PM    VLDL, calculated 48 (H) 02/19/2018 04:08 PM    Triglyceride 239 (H) 02/19/2018 04:08 PM      Thyroid Panel Lab Results   Component Value Date/Time    TSH 1.150 03/05/2022 04:57 PM    TSH 3.460 12/10/2021 12:38 PM    T4, Free 0.9 03/05/2022 04:57 PM    T4, Free 0.99 12/10/2021 12:38 PM        Most Recent UA Lab Results   Component Value Date/Time    Color YELLOW 02/23/2022 11:28 AM    Appearance CLEAR 02/23/2022 11:28 AM Specific gravity 1.010 02/23/2022 11:28 AM    pH (UA) 5.5 02/23/2022 11:28 AM    Protein Negative 02/23/2022 11:28 AM    Glucose Negative 02/23/2022 11:28 AM    Ketone Negative 02/23/2022 11:28 AM    Bilirubin Negative 02/23/2022 11:28 AM    Blood TRACE (A) 02/23/2022 11:28 AM    Urobilinogen 0.2 02/23/2022 11:28 AM    Nitrites Negative 02/23/2022 11:28 AM    Leukocyte Esterase Negative 02/23/2022 11:28 AM    WBC 0 02/23/2022 11:28 AM    RBC 0-3 02/23/2022 11:28 AM    Epithelial cells 0 02/23/2022 11:28 AM    Bacteria 0 02/23/2022 11:28 AM    Casts 0-3 02/23/2022 11:28 AM            Other Studies:  No results found. Signed:  Shay Villagran MD    Part of this note may have been written by using a voice dictation software. The note has been proof read but may still contain some grammatical/other typographical errors.

## 2022-03-07 NOTE — PROGRESS NOTES
POD 4    HGB - 11.1  Drain - <100cc    AF,VSS  No complaints except postop pain. Preop pains resolved, eating and voiding  Dressing dry  Neuro intact  Stable, walking > 250ft, wants to go home so will discharge.

## 2022-03-07 NOTE — DISCHARGE INSTRUCTIONS
MAY shower NO tub bathing    LEAVE dressing on incision for 3 DAYS-->then may remove   (If dressing starts to fall off may re-secure with tape)    NO lifting anything heavier than 5LBS     NO Bending, Lifting or Twisting    NO driving until directed by your doctor    Avoid sitting more than 20 - 30 minutes at a time    DO NOT take any NSAIDS (either prescribed or over the counter until directed    (Aleve, Ibuprofen, Mobic, etc) as this will interfere with bone healing    CALL the doctor if:  Fever >100.5  ([phone])                 Incision becomes red, swollen or  opens up               Incision has yellow, thick drainage or an odor               Pain is not managed with prescribed medications               Excessive nausea and/or vomiting    Avoid having pets sleep in bed with you until incision is completely healed

## 2022-03-07 NOTE — PROGRESS NOTES
ACUTE PHYSICAL THERAPY GOALS:  (Developed with and agreed upon by patient and/or caregiver. )  LTG:  (1.)Ms. Rupinder Silva will move from supine to sit and sit to supine, scoot up and down and roll side to side in bed with INDEPENDENCE via log roll within 7 treatment day(s). (2.)Ms. Rupinder Silva will transfer from bed to chair and chair to bed with MODIFIED INDEPENDENCE using the least restrictive device within 7 treatment day(s). (3.)Ms. Rupinder Silva will ambulate with SUPERVISION for 300+ feet with the least restrictive device within 7 treatment day(s). GOAL MET 3/5/2022    (4.)Ms. Rupinder Silva will independently verbalize 3/3 post op spinal precautions and maintain compliance within 7 days. PHYSICAL THERAPY: Daily Note and AM Treatment Day # 4    Bartolo Guzman is a 74 y.o. female   PRIMARY DIAGNOSIS: <principal problem not specified>  Lumbar stenosis with neurogenic claudication [M48.062]  Spinal stenosis [M48.00]  Procedure(s) (LRB):  L2-L3 DLIF (N/A)  L2-L3 LAMI FUSION/ L2-L5 REVISION INSTRUMENTATION, REDO RIGHT L3-4 LAMI/DISC (N/A)  4 Days Post-Op    ASSESSMENT:     REHAB RECOMMENDATIONS: CURRENT LEVEL OF FUNCTION:  (Most Recently Demonstrated)   Recommendation to date pending progress:  [address] [place] [address] Therapy  Equipment:    None Bed Mobility:   Not tested  Sit to Stand:   Supervision  Transfers:   Supervision  Gait/Mobility:  Saad Meehan Assistance     ASSESSMENT:  Ms. Rupinder Silva is sitting in the recliner and agreeable to therapy. States that she think she might go home today if her Hgb is acceptable. Patient is wearing her brace. Mobility is good and requires supervision. Gait training with rolling walker x 750 feet with good/safe debbie. Patient is returned to the recliner and she is able to position herself for comfort. Needs within reach. Doing well and functionally ready to go home.      SUBJECTIVE:   Ms. Rupinder Silva states, \"I might go home today if my hgb is acceptable\"    SOCIAL HISTORY/ LIVING ENVIRONMENT: Lives alone. Independent at baseline without DME use.   Home Environment: Private residence  # Steps to Enter: 2  One/Two Story Residence: Two story, live on 1st floor  Living Alone: Yes  Support Systems: Other Family Member(s)  OBJECTIVE:     PAIN: VITAL SIGNS: LINES/DRAINS:   Pre Treatment: Pain Screen  Pain Scale 1: Numeric (0 - 10)  Pain Intensity 1:  (no number given)  Post Treatment:no number given   Hemovac  O2 Device: None (Room air)     MOBILITY: I Mod I S SBA CGA Min Mod Max Total  NT x2 Comments:   Bed Mobility    Rolling [] [] [] [] [] [] [] [] [] [x] []    Supine to Sit [] [] [] [] [] [] [] [] [] [x] []    Scooting [] [] [] [] [] [] [] [] [] [x] []    Sit to Supine [] [] [] [] [] [] [] [] [] [x] []    Transfers    Sit to Stand [] [] [x] [] [] [] [] [] [] [] []    Bed to Chair [] [] [] [] [] [] [] [] [] [x] []    Stand to Sit [] [] [x] [] [] [] [] [] [] [] []    I=Independent, Mod I=Modified Independent, S=Supervision, SBA=Standby Assistance, CGA=Contact Guard Assistance,   Min=Minimal Assistance, Mod=Moderate Assistance, Max=Maximal Assistance, Total=Total Assistance, NT=Not Tested    BALANCE: Good Fair+ Fair Fair- Poor NT Comments   Sitting Static [x] [] [] [] [] []    Sitting Dynamic [x] [] [] [] [] []              Standing Static [x] [] [] [] [] []    Standing Dynamic [x] [] [] [] [] []      GAIT: I Mod I S SBA CGA Min Mod Max Total  NT x2 Comments:   Level of Assistance [] [] [x] [] [] [] [] [] [] [] []    Distance 750 ft    DME Rolling Walker    Gait Quality Slow, cautious    Weightbearing  Status N/A     I=Independent, Mod I=Modified Independent, S=Supervision, SBA=Standby Assistance, CGA=Contact Guard Assistance,   Min=Minimal Assistance, Mod=Moderate Assistance, Max=Maximal Assistance, Total=Total Assistance, NT=Not Tested    PLAN:   FREQUENCY/DURATION: PT Plan of Care: BID for duration of hospital stay or until stated goals are met, whichever comes first.  TREATMENT:     TREATMENT: ($$ Therapeutic Activity: 8-22 mins    )  Therapeutic Activity (15 Minutes): Therapeutic activity included Transfer Training, Ambulation on level ground, Sitting balance  and Standing balance to improve functional Mobility, Strength and Activity tolerance.     TREATMENT GRID:  N/A    AFTER TREATMENT POSITION/PRECAUTIONS:  Chair, Needs within reach and RN notified    INTERDISCIPLINARY COLLABORATION:  RN/PCT and PT/PTA    TOTAL TREATMENT DURATION:  PT Patient Time In/Time Out  Time In: 0551  Time Out: 0933    Chris Cardona PTA

## 2022-03-07 NOTE — PROGRESS NOTES
Pt is medically cleared for dc to home today. No dc needs or concerns identified or reported. SW remains available to assist if needed. Care Management Interventions  PCP Verified by CM: Yes  Last Visit to PCP: 12/13/21  Mode of Transport at Discharge:  Other (see comment) (family)  Discharge Durable Medical Equipment: No  Physical Therapy Consult: Yes  Occupational Therapy Consult: No  Speech Therapy Consult: No  Support Systems: Other Family Member(s)  Confirm Follow Up Transport: Family  Discharge Location  Patient Expects to be Discharged to[de-identified] Home with family assistance

## 2022-03-07 NOTE — PROGRESS NOTES
PT note:  Treatment deferred at this time as the patient states that she has gas and would rather not walk in the hallway at this time but would like to try it later. Will return as time permits.   Esperanza Phalen, PTA

## 2022-03-07 NOTE — PROGRESS NOTES
Discharge instructions, medication side effects sheet, follow up appointment and prescriptions reviewed and explained to the patient. Patient verbalizes understanding of instructions. A copy of discharge instructions and prescriptions  have been given to patient. Opportunity for questions provided.  Flu vaccine

## 2022-03-08 RX ORDER — FOLIC ACID 1 MG/1
1 TABLET ORAL DAILY
Qty: 30 TABLET | Refills: 0 | Status: SHIPPED | OUTPATIENT
Start: 2022-03-08

## 2022-03-08 RX ORDER — LANOLIN ALCOHOL/MO/W.PET/CERES
1000 CREAM (GRAM) TOPICAL DAILY
Qty: 30 TABLET | Refills: 5 | Status: SHIPPED | OUTPATIENT
Start: 2022-03-08

## 2022-03-09 ENCOUNTER — HOME HEALTH ADMISSION (OUTPATIENT)
Dept: HOME HEALTH SERVICES | Facility: HOME HEALTH | Age: 67
End: 2022-03-09
Payer: MEDICARE

## 2022-03-11 ENCOUNTER — HOME CARE VISIT (OUTPATIENT)
Dept: SCHEDULING | Facility: HOME HEALTH | Age: 67
End: 2022-03-11
Payer: MEDICARE

## 2022-03-11 VITALS
TEMPERATURE: 97.5 F | HEART RATE: 78 BPM | DIASTOLIC BLOOD PRESSURE: 64 MMHG | RESPIRATION RATE: 16 BRPM | SYSTOLIC BLOOD PRESSURE: 122 MMHG | OXYGEN SATURATION: 97 %

## 2022-03-11 PROCEDURE — 400013 HH SOC

## 2022-03-11 PROCEDURE — 400018 HH-NO PAY CLAIM PROCEDURE

## 2022-03-11 PROCEDURE — 3331090002 HH PPS REVENUE DEBIT

## 2022-03-11 PROCEDURE — G0151 HHCP-SERV OF PT,EA 15 MIN: HCPCS

## 2022-03-11 PROCEDURE — 3331090001 HH PPS REVENUE CREDIT

## 2022-03-12 PROCEDURE — 3331090001 HH PPS REVENUE CREDIT

## 2022-03-12 PROCEDURE — 3331090002 HH PPS REVENUE DEBIT

## 2022-03-13 PROCEDURE — 3331090002 HH PPS REVENUE DEBIT

## 2022-03-13 PROCEDURE — 3331090001 HH PPS REVENUE CREDIT

## 2022-03-14 PROCEDURE — 3331090002 HH PPS REVENUE DEBIT

## 2022-03-14 PROCEDURE — 3331090001 HH PPS REVENUE CREDIT

## 2022-03-15 ENCOUNTER — HOME CARE VISIT (OUTPATIENT)
Dept: SCHEDULING | Facility: HOME HEALTH | Age: 67
End: 2022-03-15
Payer: MEDICARE

## 2022-03-15 VITALS
DIASTOLIC BLOOD PRESSURE: 68 MMHG | HEART RATE: 88 BPM | OXYGEN SATURATION: 99 % | SYSTOLIC BLOOD PRESSURE: 120 MMHG | RESPIRATION RATE: 20 BRPM | TEMPERATURE: 97.9 F

## 2022-03-15 PROCEDURE — 3331090001 HH PPS REVENUE CREDIT

## 2022-03-15 PROCEDURE — 3331090002 HH PPS REVENUE DEBIT

## 2022-03-15 PROCEDURE — G0151 HHCP-SERV OF PT,EA 15 MIN: HCPCS

## 2022-03-16 PROCEDURE — 3331090001 HH PPS REVENUE CREDIT

## 2022-03-16 PROCEDURE — 3331090002 HH PPS REVENUE DEBIT

## 2022-03-17 ENCOUNTER — HOME CARE VISIT (OUTPATIENT)
Dept: SCHEDULING | Facility: HOME HEALTH | Age: 67
End: 2022-03-17
Payer: MEDICARE

## 2022-03-17 VITALS
DIASTOLIC BLOOD PRESSURE: 64 MMHG | RESPIRATION RATE: 15 BRPM | OXYGEN SATURATION: 98 % | HEART RATE: 89 BPM | SYSTOLIC BLOOD PRESSURE: 122 MMHG | TEMPERATURE: 97.1 F

## 2022-03-17 PROCEDURE — G0157 HHC PT ASSISTANT EA 15: HCPCS

## 2022-03-17 PROCEDURE — 3331090002 HH PPS REVENUE DEBIT

## 2022-03-17 PROCEDURE — 3331090001 HH PPS REVENUE CREDIT

## 2022-03-18 PROBLEM — M48.02 CERVICAL SPINAL STENOSIS: Status: ACTIVE | Noted: 2018-05-15

## 2022-03-18 PROBLEM — G62.9 NEUROPATHY: Status: ACTIVE | Noted: 2020-11-17

## 2022-03-18 PROBLEM — M48.061 SPINAL STENOSIS OF LUMBAR REGION AT MULTIPLE LEVELS: Status: ACTIVE | Noted: 2019-01-07

## 2022-03-18 PROBLEM — M54.12 CERVICAL RADICULOPATHY AT C5: Status: ACTIVE | Noted: 2018-03-14

## 2022-03-18 PROBLEM — F11.90 CHRONIC NARCOTIC USE: Status: ACTIVE | Noted: 2017-12-05

## 2022-03-18 PROCEDURE — 3331090001 HH PPS REVENUE CREDIT

## 2022-03-18 PROCEDURE — 3331090002 HH PPS REVENUE DEBIT

## 2022-03-19 PROBLEM — R05.8 POST-VIRAL COUGH SYNDROME: Status: ACTIVE | Noted: 2017-10-24

## 2022-03-19 PROBLEM — R20.2 NUMBNESS AND TINGLING OF BOTH FEET: Status: ACTIVE | Noted: 2020-11-17

## 2022-03-19 PROBLEM — M79.641 PAIN IN BOTH HANDS: Status: ACTIVE | Noted: 2018-10-03

## 2022-03-19 PROBLEM — R29.3 POSTURAL IMBALANCE: Status: ACTIVE | Noted: 2020-11-17

## 2022-03-19 PROBLEM — R29.898 WEAKNESS OF BOTH LEGS: Status: ACTIVE | Noted: 2020-11-17

## 2022-03-19 PROBLEM — G89.29 OTHER CHRONIC PAIN: Status: ACTIVE | Noted: 2018-04-17

## 2022-03-19 PROBLEM — R20.0 NUMBNESS AND TINGLING OF BOTH FEET: Status: ACTIVE | Noted: 2020-11-17

## 2022-03-19 PROBLEM — R10.2 CHRONIC PELVIC PAIN IN FEMALE: Status: ACTIVE | Noted: 2017-11-08

## 2022-03-19 PROBLEM — R20.0 NUMBNESS AND TINGLING: Status: ACTIVE | Noted: 2018-05-15

## 2022-03-19 PROBLEM — R20.2 NUMBNESS AND TINGLING: Status: ACTIVE | Noted: 2018-05-15

## 2022-03-19 PROBLEM — G89.29 CHRONIC PELVIC PAIN IN FEMALE: Status: ACTIVE | Noted: 2017-11-08

## 2022-03-19 PROBLEM — M51.36 DDD (DEGENERATIVE DISC DISEASE), LUMBAR: Status: ACTIVE | Noted: 2018-04-25

## 2022-03-19 PROBLEM — M79.642 PAIN IN BOTH HANDS: Status: ACTIVE | Noted: 2018-10-03

## 2022-03-19 PROBLEM — M51.369 DDD (DEGENERATIVE DISC DISEASE), LUMBAR: Status: ACTIVE | Noted: 2018-04-25

## 2022-03-19 PROBLEM — M43.16 SPONDYLOLISTHESIS AT L2-L3 LEVEL: Status: ACTIVE | Noted: 2022-03-03

## 2022-03-19 PROCEDURE — 3331090002 HH PPS REVENUE DEBIT

## 2022-03-19 PROCEDURE — 3331090001 HH PPS REVENUE CREDIT

## 2022-03-20 PROBLEM — M48.00 SPINAL STENOSIS: Status: ACTIVE | Noted: 2022-03-03

## 2022-03-20 PROBLEM — D62 ACUTE BLOOD LOSS AS CAUSE OF POSTOPERATIVE ANEMIA: Status: ACTIVE | Noted: 2019-01-09

## 2022-03-20 PROCEDURE — 3331090001 HH PPS REVENUE CREDIT

## 2022-03-20 PROCEDURE — 3331090002 HH PPS REVENUE DEBIT

## 2022-03-21 ENCOUNTER — HOME CARE VISIT (OUTPATIENT)
Dept: HOME HEALTH SERVICES | Facility: HOME HEALTH | Age: 67
End: 2022-03-21
Payer: MEDICARE

## 2022-03-21 PROCEDURE — 3331090002 HH PPS REVENUE DEBIT

## 2022-03-21 PROCEDURE — 3331090001 HH PPS REVENUE CREDIT

## 2022-03-22 ENCOUNTER — HOME CARE VISIT (OUTPATIENT)
Dept: SCHEDULING | Facility: HOME HEALTH | Age: 67
End: 2022-03-22
Payer: MEDICARE

## 2022-03-22 VITALS
DIASTOLIC BLOOD PRESSURE: 62 MMHG | SYSTOLIC BLOOD PRESSURE: 116 MMHG | HEART RATE: 90 BPM | OXYGEN SATURATION: 97 % | TEMPERATURE: 97.5 F | RESPIRATION RATE: 16 BRPM

## 2022-03-22 PROCEDURE — G0151 HHCP-SERV OF PT,EA 15 MIN: HCPCS

## 2022-03-22 PROCEDURE — 3331090001 HH PPS REVENUE CREDIT

## 2022-03-22 PROCEDURE — 3331090002 HH PPS REVENUE DEBIT

## 2022-03-23 PROCEDURE — 3331090001 HH PPS REVENUE CREDIT

## 2022-03-23 PROCEDURE — 3331090002 HH PPS REVENUE DEBIT

## 2022-03-24 ENCOUNTER — HOME CARE VISIT (OUTPATIENT)
Dept: SCHEDULING | Facility: HOME HEALTH | Age: 67
End: 2022-03-24
Payer: MEDICARE

## 2022-03-24 VITALS
DIASTOLIC BLOOD PRESSURE: 60 MMHG | RESPIRATION RATE: 20 BRPM | TEMPERATURE: 98.4 F | OXYGEN SATURATION: 96 % | SYSTOLIC BLOOD PRESSURE: 130 MMHG | HEART RATE: 86 BPM

## 2022-03-24 PROCEDURE — 3331090002 HH PPS REVENUE DEBIT

## 2022-03-24 PROCEDURE — G0151 HHCP-SERV OF PT,EA 15 MIN: HCPCS

## 2022-03-24 PROCEDURE — 3331090001 HH PPS REVENUE CREDIT

## 2022-03-25 PROCEDURE — 3331090002 HH PPS REVENUE DEBIT

## 2022-03-25 PROCEDURE — 3331090001 HH PPS REVENUE CREDIT

## 2022-03-26 PROCEDURE — 3331090002 HH PPS REVENUE DEBIT

## 2022-03-26 PROCEDURE — 3331090001 HH PPS REVENUE CREDIT

## 2022-03-27 PROCEDURE — 3331090002 HH PPS REVENUE DEBIT

## 2022-03-27 PROCEDURE — 3331090001 HH PPS REVENUE CREDIT

## 2022-03-28 ENCOUNTER — HOME CARE VISIT (OUTPATIENT)
Dept: SCHEDULING | Facility: HOME HEALTH | Age: 67
End: 2022-03-28
Payer: MEDICARE

## 2022-03-28 VITALS
OXYGEN SATURATION: 99 % | RESPIRATION RATE: 16 BRPM | TEMPERATURE: 97.7 F | HEART RATE: 77 BPM | DIASTOLIC BLOOD PRESSURE: 62 MMHG | SYSTOLIC BLOOD PRESSURE: 128 MMHG

## 2022-03-28 PROCEDURE — 3331090001 HH PPS REVENUE CREDIT

## 2022-03-28 PROCEDURE — 3331090002 HH PPS REVENUE DEBIT

## 2022-03-28 PROCEDURE — G0157 HHC PT ASSISTANT EA 15: HCPCS

## 2022-03-29 PROCEDURE — 3331090002 HH PPS REVENUE DEBIT

## 2022-03-29 PROCEDURE — 3331090001 HH PPS REVENUE CREDIT

## 2022-03-30 PROCEDURE — 3331090001 HH PPS REVENUE CREDIT

## 2022-03-30 PROCEDURE — 3331090002 HH PPS REVENUE DEBIT

## 2022-03-31 PROCEDURE — 3331090002 HH PPS REVENUE DEBIT

## 2022-03-31 PROCEDURE — 3331090001 HH PPS REVENUE CREDIT

## 2022-04-01 ENCOUNTER — HOME CARE VISIT (OUTPATIENT)
Dept: SCHEDULING | Facility: HOME HEALTH | Age: 67
End: 2022-04-01
Payer: MEDICARE

## 2022-04-01 VITALS
TEMPERATURE: 98 F | HEART RATE: 72 BPM | OXYGEN SATURATION: 97 % | SYSTOLIC BLOOD PRESSURE: 130 MMHG | DIASTOLIC BLOOD PRESSURE: 64 MMHG | RESPIRATION RATE: 16 BRPM

## 2022-04-01 PROCEDURE — G0151 HHCP-SERV OF PT,EA 15 MIN: HCPCS

## 2022-04-01 PROCEDURE — 3331090001 HH PPS REVENUE CREDIT

## 2022-04-01 PROCEDURE — 3331090002 HH PPS REVENUE DEBIT

## 2022-04-01 NOTE — DISCHARGE SUMMARY
[address] [address] [address]     Name:  Sho Masters  MR#:  [id number]  :  1955  ACCOUNT #:  [de-identified]  ADMIT DATE:  2022  DISCHARGE DATE:  2022    PRIMARY DIAGNOSIS:  Spinal stenosis above her prior fusion. HISTORY OF PRESENT ILLNESS:  The patient is a 25-year-old female, who had previously had an L3 to L5 instrumented fusion and decompression, had done well for a period of time but over, she developed stenosis above the L2-3 level. She failed conservative treatment so was brought for surgery. She was brought in on 2022, taken in the operating room where she underwent an L2-3 anterior fusion to a DLIF approach followed by posterior laminectomy and extension of her instrumentation from L2 to L5 posterior fusion. She tolerated the surgery well and did very well in the postoperative course. She had typical postoperative pain but she could tell a difference that the preoperative pain had resolved. She remained afebrile with stable vital signs throughout the stay and she was able to eat and drink and void normally. Her Hemovac drain was removed on postoperative day #3. She was seen by Therapy and started ambulating and by postoperative day #4, she had completed her physical therapy goals and she was ready for discharge. So, we will discharge her to home on 2022. We will change her dressing before she leaves and we will give her refill of her pain medication. I will see her back in the office in two weeks.       Srinivas Velázquez MD      [id number]  D:  2022 12:34  T:  2022 0:24  JOB #:  [id number]

## 2022-05-04 ENCOUNTER — TRANSCRIBE ORDER (OUTPATIENT)
Dept: SCHEDULING | Age: 67
End: 2022-05-04

## 2022-05-05 ENCOUNTER — TRANSCRIBE ORDER (OUTPATIENT)
Dept: SCHEDULING | Age: 67
End: 2022-05-05

## 2022-05-05 DIAGNOSIS — M54.12 CERVICAL RADICULOPATHY: Primary | ICD-10-CM

## 2022-05-10 ENCOUNTER — TRANSCRIBE ORDER (OUTPATIENT)
Dept: SCHEDULING | Age: 67
End: 2022-05-10

## 2022-05-10 DIAGNOSIS — M54.12 CERVICAL RADICULOPATHY: Primary | ICD-10-CM

## 2022-05-25 ENCOUNTER — OFFICE VISIT (OUTPATIENT)
Dept: ORTHOPEDIC SURGERY | Age: 67
End: 2022-05-25

## 2022-05-25 DIAGNOSIS — M54.2 NECK PAIN: ICD-10-CM

## 2022-05-25 DIAGNOSIS — Z98.1 ARTHRODESIS STATUS: Primary | ICD-10-CM

## 2022-05-25 PROCEDURE — 99024 POSTOP FOLLOW-UP VISIT: CPT | Performed by: ORTHOPAEDIC SURGERY

## 2022-05-25 NOTE — LETTER
Kaia Mcleod  1955  66 y.o.  [id number]    Diagnosis Code:                              RT                  LT                  BILAT    DDDZ   LBP   L/S NEURITIS   L SPRAIN   SPONDY   L STENOSIS   L DISC   POST MONTENEGRO   CRONIC PAIN    SCOLI   LIMB PAIN   TH PAIN   SI JOINT   C DDDZ   C STENOSIS   C DISC   BRACH NEUR   NECK PAIN    CTS   COCCYX   OSTEO   COMP FX   HIP BURS   POST FUS   POST NON   SH PAIN   ARM PAIN    OTHER____________________________________________________________      Radiographic Studies:    CERV/ THORACIC/ LUMBAR MRI       WITH/  W/O  Contrast              _____________________Discogram    CT /Myelogram of _______________                  NCS/EMG  Upper / Lower Extremity________________    MRI of _______________________                     Other______________________    Injections:     ________________________ESI/ SNRB/ FACET                     _______________________Rhizotomies     Authorization to stop blood thinners: _____________________________________________________      Medications:    __________________Sterpred DS                                    ___________________Gabapentin QD/ BID/ TID    __________________Norco/Tramadol   QD / BID / TID    ____________________NSAIDS  QD / BID / TID    __________________Flexeril QD/ BID/ TID                           ____________________Other      Visit Charges:    Recheck 2           Recheck 3               Recheck 4              Recheck 5      INJ ______________________    New PT 2            New PT 3                New PT 4               New PT 5          Pre-op / Post-op      Physical Therapy:    Lumbar  /  Cervical                     ___________________________ (Traction / Ultrasound, Dry Needling)       Referral: Rowe Barton /  [facility]   /OTHER: __________________________________________      Follow-up with SEL______________________________________________________________      Work Note: _____________________________________________________________________

## 2022-06-01 ENCOUNTER — OFFICE VISIT (OUTPATIENT)
Dept: ORTHOPEDIC SURGERY | Age: 67
End: 2022-06-01
Payer: MEDICARE

## 2022-06-01 ENCOUNTER — HOSPITAL ENCOUNTER (OUTPATIENT)
Dept: MRI IMAGING | Age: 67
Discharge: HOME | End: 2022-06-04
Payer: MEDICARE

## 2022-06-01 DIAGNOSIS — M20.21 HALLUX RIGIDUS OF RIGHT FOOT: Primary | ICD-10-CM

## 2022-06-01 DIAGNOSIS — M54.12 CERVICAL RADICULOPATHY: ICD-10-CM

## 2022-06-01 PROCEDURE — 99214 OFFICE O/P EST MOD 30 MIN: CPT | Performed by: ORTHOPAEDIC SURGERY

## 2022-06-01 PROCEDURE — 3017F COLORECTAL CA SCREEN DOC REV: CPT | Performed by: ORTHOPAEDIC SURGERY

## 2022-06-01 PROCEDURE — 72141 MRI NECK SPINE W/O DYE: CPT

## 2022-06-01 PROCEDURE — 1036F TOBACCO NON-USER: CPT | Performed by: ORTHOPAEDIC SURGERY

## 2022-06-01 PROCEDURE — 1090F PRES/ABSN URINE INCON ASSESS: CPT | Performed by: ORTHOPAEDIC SURGERY

## 2022-06-01 PROCEDURE — 1123F ACP DISCUSS/DSCN MKR DOCD: CPT | Performed by: ORTHOPAEDIC SURGERY

## 2022-06-01 PROCEDURE — G8400 PT W/DXA NO RESULTS DOC: HCPCS | Performed by: ORTHOPAEDIC SURGERY

## 2022-06-01 PROCEDURE — G8420 CALC BMI NORM PARAMETERS: HCPCS | Performed by: ORTHOPAEDIC SURGERY

## 2022-06-01 PROCEDURE — G8428 CUR MEDS NOT DOCUMENT: HCPCS | Performed by: ORTHOPAEDIC SURGERY

## 2022-06-01 PROCEDURE — 20600 DRAIN/INJ JOINT/BURSA W/O US: CPT | Performed by: ORTHOPAEDIC SURGERY

## 2022-06-01 NOTE — PROGRESS NOTES
Name: Tony Watkins  YOB: 1955  Gender: female  MRN: [id number]    Summary:   Right hallux rigidus and new right second mallet toe deformity       CC: No chief complaint on file. HPI: Tony Watkins is a 74 y.o. female who presents with No chief complaint on file. .  Patient presents back to the office a for follow-up of her right hallux rigidus. Additionally she hit her foot against a storm door and has had a deformity of her second toe on the same side. She has a history of hallux rigidus and has tried injections in the past.    History was obtained by Patient     ROS/Meds/PSH/PMH/FH/SH: I personally reviewed the patients standard intake form. Below are the pertinents    Tobacco:  reports that she has never smoked. She has never used smokeless tobacco.  Diabetes: None      Physical Examination:        Imaging:   No imaging reviewed           Tiffanie Kahn III, MD           Assessment:   Right hallux rigidus and new onset right second mallet toe    Treatment Plan:   4 This is a chronic illness/condition with exacerbation and progression  Treatment at this time: Minor Procedure: Injection done today: The patient understands the risks and complications associated with injection. After sterile prep of the area, the Right big toe MTP joint was injected with 3 cc. of Xylocaine and 3 cc. of steroid. . 40mg Depo Medrol was the steroid used. Patient tolerated it well. I discussed the risk of infection and skin blanching. I told the be patient be careful about the symptoms of hyperglycemia such as GI distress, polyuria, excessive thirst and lethargy. If these symptoms occur they should present to an primary care doctor or urgent facility  Studies ordered: NO XR needed @ Next Visit    Weight-bearing status: WBAT        Return to work/work restrictions: none  none    We discussed a cheilectomy potentially in the future for this as well as letting the second mallet toe try to heal some more.

## 2022-06-20 ENCOUNTER — TELEPHONE (OUTPATIENT)
Dept: ORTHOPEDIC SURGERY | Age: 67
End: 2022-06-20

## 2022-06-20 ENCOUNTER — OFFICE VISIT (OUTPATIENT)
Dept: ORTHOPEDIC SURGERY | Age: 67
End: 2022-06-20
Payer: MEDICARE

## 2022-06-20 DIAGNOSIS — M48.02 CERVICAL SPINAL STENOSIS: ICD-10-CM

## 2022-06-20 DIAGNOSIS — M50.30 DDD (DEGENERATIVE DISC DISEASE), CERVICAL: Primary | ICD-10-CM

## 2022-06-20 PROCEDURE — G8427 DOCREV CUR MEDS BY ELIG CLIN: HCPCS | Performed by: ORTHOPAEDIC SURGERY

## 2022-06-20 PROCEDURE — G8400 PT W/DXA NO RESULTS DOC: HCPCS | Performed by: ORTHOPAEDIC SURGERY

## 2022-06-20 PROCEDURE — 1090F PRES/ABSN URINE INCON ASSESS: CPT | Performed by: ORTHOPAEDIC SURGERY

## 2022-06-20 PROCEDURE — 99214 OFFICE O/P EST MOD 30 MIN: CPT | Performed by: ORTHOPAEDIC SURGERY

## 2022-06-20 PROCEDURE — 1123F ACP DISCUSS/DSCN MKR DOCD: CPT | Performed by: ORTHOPAEDIC SURGERY

## 2022-06-20 PROCEDURE — 3017F COLORECTAL CA SCREEN DOC REV: CPT | Performed by: ORTHOPAEDIC SURGERY

## 2022-06-20 PROCEDURE — G8420 CALC BMI NORM PARAMETERS: HCPCS | Performed by: ORTHOPAEDIC SURGERY

## 2022-06-20 PROCEDURE — 1036F TOBACCO NON-USER: CPT | Performed by: ORTHOPAEDIC SURGERY

## 2022-06-20 NOTE — PROGRESS NOTES
Name: Tony Watkins  YOB: 1955  Gender: female  MRN: [id number]  Age: 74 y.o. Chief Complaint: Neck and radiating upper extremity pain. History of present illness: This is a very pleasant 74 y.o. female who is well-known to me from her previous cervical surgeries. She has had a C6-C7 ACDF and a C3-C4 posterior cervical.  She recently had a direct lateral lumbar fusion by Dr. Deisy Galindo. She now has increased neck pain. No real radicular symptoms or myelopathy. There is a lot of neck pain. Medications:     Prior to Visit Medications    Medication Sig Taking?  Authorizing Provider   amLODIPine (NORVASC) 5 MG tablet TAKE ONE TABLET BY MOUTH ONE TIME DAILY  Ar Automatic Reconciliation   buPROPion (WELLBUTRIN XL) 300 MG extended release tablet TAKE ONE TABLET BY MOUTH ONE TIME DAILY  Ar Automatic Reconciliation   busPIRone (BUSPAR) 15 MG tablet Take 15 mg by mouth 2 times daily  Ar Automatic Reconciliation   celecoxib (CELEBREX) 100 MG capsule Take 100 mg by mouth 2 times daily  Ar Automatic Reconciliation   vitamin D (CHOLECALCIFEROL) 25 MCG (1000 UT) TABS tablet Take 5,000 Units by mouth daily  Ar Automatic Reconciliation   cyanocobalamin 1000 MCG tablet Take 1,000 mcg by mouth daily  Ar Automatic Reconciliation   cyclobenzaprine (FLEXERIL) 10 MG tablet Take 10 mg by mouth 3 times daily as needed  Ar Automatic Reconciliation   dextromethorphan-guaiFENesin (MUCINEX DM)  MG per extended release tablet Take 1 tablet by mouth 2 times daily  Ar Automatic Reconciliation   estrogens, conjugated, (PREMARIN) 1.25 MG tablet TAKE 1 TABLET BY MOUTH DAILY  Ar Automatic Reconciliation   ferrous sulfate (IRON 325) 325 (65 Fe) MG tablet Take 1 tablet by mouth every morning (before breakfast)  Ar Automatic Reconciliation   folic acid (FOLVITE) 1 MG tablet Take 1 mg by mouth daily  Ar Automatic Reconciliation   HYDROcodone-acetaminophen (NORCO)  MG per tablet Take 1 tablet by mouth 4 times daily as needed. Ar Automatic Reconciliation   hydrOXYzine (VISTARIL) 100 MG capsule Take 100 mg by mouth 3 times daily as needed  Ar Automatic Reconciliation   LORazepam (ATIVAN) 1 MG tablet TAKE ONE TABLET BY MOUTH TWICE A DAY AS NEEDED FOR ANXIETY  Ar Automatic Reconciliation   medroxyPROGESTERone (PROVERA) 5 MG tablet Take 5 mg by mouth daily  Ar Automatic Reconciliation   pantoprazole (PROTONIX) 40 MG tablet TAKE ONE TABLET BY MOUTH ONE TIME DAILY  Ar Automatic Reconciliation   pregabalin (LYRICA) 75 MG capsule Take 75 mg by mouth 3 times daily. Ar Automatic Reconciliation   Simethicone 125 MG CAPS Take 125 mg by mouth 4 times daily as needed  Ar Automatic Reconciliation   traZODone (DESYREL) 50 MG tablet Take 1-2 tabs po QHS PRN  Ar Automatic Reconciliation   valsartan (DIOVAN) 160 MG tablet TAKE ONE TABLET BY MOUTH ONE TIME DAILY  Ar Automatic Reconciliation   zolpidem (AMBIEN) 10 MG tablet TAKE ONE TABLET BY MOUTH AT BEDTIME  Ar Automatic Reconciliation       Allergies: Allergies   Allergen Reactions    Latex Itching    Celecoxib Other (See Comments)     constipation  Other reaction(s): Other- (not listed) - Allergy  constipation    Mirtazapine Other (See Comments)     Greater than expected reaction  Other reaction(s): Drowsiness-Intolerance  Greater than expected reaction        Physical Exam:     This is a well developed well nourished adult female in no acute distress. Oriented to person, place and time. Mood and affect are appropriate. Respirations are unlabored and there is no evidence of cyanosis. Patient can flex normally but extension is limited by exacerbation of the symptoms. There is not significant tenderness to palpation along the spinous processes or paraspinal musculature. Patient ambulates with a normal tandem gait.      Sensory testing reveals intact sensation to light touch and in the distribution of the C5-T1 dermatomes bilaterally    Reflexes     Right Left Biceps (C5) 2 2   Brachio radialis (C6) 2 2    Triceps (C7) 2 2       Garcia's is negative       Tinel's and Chris testing over the cubital and carpal tunnels do not reproduce the symptoms. Shoulder examination is not consistent with adhesive capsulitis or acute rotator cuff tendinitis. Strength testing in the upper extremity reveals the following based on the 5 point grading scale:     Delt(C5) Bicep(C6) WE(C6) Tricep (C7) WF(C7) (C8) Int (T1)   Right 5 5 5 5 5 5 5   Left 5 5 5 5 5 5 5       Radiographic Studies:    MRI Cervical spine, report and images reviewed and interpreted and reveals postoperative changes status post C3-C4 posterior cervical and also a C6-7 anterior cervical fusion. There is significant disc degeneration at C5-C6 with foraminal stenosis and some central stenosis. Course and size of the vertebral arteries: Normal      Diagnosis:      ICD-10-CM    1. DDD (degenerative disc disease), cervical  M50.30    2. Cervical spinal stenosis  M48.02        Assessment/Plan: This patient's clinical history and physical exam is consistent with mechanical neck pain most likely related to the C5-C6 disc. It may have been exacerbated by being positioned prone during her lumbar surgery. At this point, I recommended symptomatic care. I think she might benefit from a cervical epidural steroid injection as well. In the end, if she ever needs additional surgery on her cervical spine we probably would need to bridge the 2 fusions otherwise she is going to continue to have problems.           Electronically Signed By Susannah Stanley MD   2:08 PM

## 2022-06-21 ENCOUNTER — TELEPHONE (OUTPATIENT)
Dept: ORTHOPEDIC SURGERY | Age: 67
End: 2022-06-21

## 2022-06-21 NOTE — TELEPHONE ENCOUNTER
Returning call and home phone was busy. Tried calling her cell and left voice mail. Instructed her to call back with detailed message regarding her questions or she could mychart me her questions.

## 2022-08-18 ENCOUNTER — TELEPHONE (OUTPATIENT)
Dept: OBGYN CLINIC | Age: 67
End: 2022-08-18

## 2022-08-18 NOTE — TELEPHONE ENCOUNTER
Patient called with complaints of bilateral breast tenderness, primarily her right breast. She states that she has a history of fibrocystic breasts and has used vitamin E along with decreased caffeine intake in the past and this has helped. She is offered and appointment tomorrow with Radha Edwards but pt states that she has been sick and is unable to come in. She would like to know what Dr. Yousif Steel recommends. Advised her that I would reach out and let her know. Last mammogram 12/6/21 Birads 2, BD3. She voiced full understanding and is aware. All questions answered.

## 2022-08-19 NOTE — TELEPHONE ENCOUNTER
Asa Campbell MD  [facility], [place]  Caller: Unspecified (Yesterday,  3:25 PM)  I recommend she decrease caffeine and supplement vitamin E 450mg daily again. Come in for evaluation if needed when she is well. [facility]     Pt is aware of recommendations and voiced full understanding. All questions answered and pt advised to call back PRN.

## 2022-08-30 ENCOUNTER — OFFICE VISIT (OUTPATIENT)
Dept: ORTHOPEDIC SURGERY | Age: 67
End: 2022-08-30
Payer: MEDICARE

## 2022-08-30 DIAGNOSIS — Z98.1 ARTHRODESIS STATUS: Primary | ICD-10-CM

## 2022-08-30 PROCEDURE — 3017F COLORECTAL CA SCREEN DOC REV: CPT | Performed by: ORTHOPAEDIC SURGERY

## 2022-08-30 PROCEDURE — 1036F TOBACCO NON-USER: CPT | Performed by: ORTHOPAEDIC SURGERY

## 2022-08-30 PROCEDURE — 99213 OFFICE O/P EST LOW 20 MIN: CPT | Performed by: ORTHOPAEDIC SURGERY

## 2022-08-30 PROCEDURE — G8427 DOCREV CUR MEDS BY ELIG CLIN: HCPCS | Performed by: ORTHOPAEDIC SURGERY

## 2022-08-30 PROCEDURE — G8400 PT W/DXA NO RESULTS DOC: HCPCS | Performed by: ORTHOPAEDIC SURGERY

## 2022-08-30 PROCEDURE — 1090F PRES/ABSN URINE INCON ASSESS: CPT | Performed by: ORTHOPAEDIC SURGERY

## 2022-08-30 PROCEDURE — G8420 CALC BMI NORM PARAMETERS: HCPCS | Performed by: ORTHOPAEDIC SURGERY

## 2022-08-30 PROCEDURE — 1123F ACP DISCUSS/DSCN MKR DOCD: CPT | Performed by: ORTHOPAEDIC SURGERY

## 2022-08-30 NOTE — PROGRESS NOTES
Name: Brandi Villarreal  YOB: 1955  Gender: female  MRN: [id number]    DATE: 2022    CC: Follow-up (4 MONTHS)       HPI this is a recheck on patient Brandi Villarreal who is now 5 months out from extending her prior L3-5 fusion up to L to 3 with a D LIF at L2-3. The patient has several specific complaints which include her buttocks sticking out, and a burning numbness in the hands and feet and pain in the left flank where her TLIF was performed. In addition she goes to pain management and she was on gabapentin but they switched her to Lyrica works which works better for the burning numbness but is caused her to gain weight. Is far as her chronic back pain is concerned it really seems to be exacerbated by doing a lot of lifting and since her   she is trying to take care of a very large house and there is a lot of lifting involved. RADIOGRAPHS: AP and lateral lumbar x-rays from 2022 show her cortical screw instrumentation from L2-L5 of with a TLIF cage at L2-3 which really helps improve her lordosis. She has a very degenerative L5-S1 level that seems to be auto fusing. I do not see any loosening of her screws and she has good overall lumbar lordosis. I compared this to her May 2022 x-rays and I do not see any change. PE: She is normal in appearance and has good sagittal alignment but she does appear to have gained some weight. I checked her left flank incision and it is well-healed and nontender and I do not feel any scarring underneath. She is tender more anteriorly in the musculature once again I do not feel any abnormal structures. She gets up and down well and seems to have a normal gait.     CURRENT MEDS:     Current Outpatient Medications:     amLODIPine (NORVASC) 5 MG tablet, TAKE ONE TABLET BY MOUTH ONE TIME DAILY, Disp: , Rfl:     buPROPion (WELLBUTRIN XL) 300 MG extended release tablet, TAKE ONE TABLET BY MOUTH ONE TIME DAILY, Disp: , Rfl:     busPIRone (BUSPAR) 15 MG tablet, Take 15 mg by mouth 2 times daily, Disp: , Rfl:     celecoxib (CELEBREX) 100 MG capsule, Take 100 mg by mouth 2 times daily, Disp: , Rfl:     vitamin D (CHOLECALCIFEROL) 25 MCG (1000 UT) TABS tablet, Take 5,000 Units by mouth daily, Disp: , Rfl:     cyanocobalamin 1000 MCG tablet, Take 1,000 mcg by mouth daily, Disp: , Rfl:     cyclobenzaprine (FLEXERIL) 10 MG tablet, Take 10 mg by mouth 3 times daily as needed, Disp: , Rfl:     dextromethorphan-guaiFENesin (MUCINEX DM)  MG per extended release tablet, Take 1 tablet by mouth 2 times daily, Disp: , Rfl:     estrogens, conjugated, (PREMARIN) 1.25 MG tablet, TAKE 1 TABLET BY MOUTH DAILY, Disp: , Rfl:     ferrous sulfate (IRON 325) 325 (65 Fe) MG tablet, Take 1 tablet by mouth every morning (before breakfast), Disp: , Rfl:     folic acid (FOLVITE) 1 MG tablet, Take 1 mg by mouth daily, Disp: , Rfl:     HYDROcodone-acetaminophen (NORCO)  MG per tablet, Take 1 tablet by mouth 4 times daily as needed. , Disp: , Rfl:     hydrOXYzine (VISTARIL) 100 MG capsule, Take 100 mg by mouth 3 times daily as needed, Disp: , Rfl:     LORazepam (ATIVAN) 1 MG tablet, TAKE ONE TABLET BY MOUTH TWICE A DAY AS NEEDED FOR ANXIETY, Disp: , Rfl:     medroxyPROGESTERone (PROVERA) 5 MG tablet, Take 5 mg by mouth daily, Disp: , Rfl:     pantoprazole (PROTONIX) 40 MG tablet, TAKE ONE TABLET BY MOUTH ONE TIME DAILY, Disp: , Rfl:     pregabalin (LYRICA) 75 MG capsule, Take 75 mg by mouth 3 times daily. , Disp: , Rfl:     Simethicone 125 MG CAPS, Take 125 mg by mouth 4 times daily as needed, Disp: , Rfl:     traZODone (DESYREL) 50 MG tablet, Take 1-2 tabs po QHS PRN, Disp: , Rfl:     valsartan (DIOVAN) 160 MG tablet, TAKE ONE TABLET BY MOUTH ONE TIME DAILY, Disp: , Rfl:     zolpidem (AMBIEN) 10 MG tablet, TAKE ONE TABLET BY MOUTH AT BEDTIME, Disp: , Rfl:    Allergies   Allergen Reactions    Latex Itching    Celecoxib Other (See Comments)     constipation  Other reaction(s): Other- (not listed) - Allergy  constipation    Mirtazapine Other (See Comments)     Greater than expected reaction  Other reaction(s): Drowsiness-Intolerance  Greater than expected reaction       ASSESSMENT/PLAN: Overall the patient has a lot of complaints and she especially complains of her neck pain and her foot pain and her left flank pain but I do not really see anything from my standpoint that needs to be done and I do not think I could really make her any better. I told her she probably be better off getting a smaller house with less responsibilities and less physical labor involved. I will see her back in 5 months. Seamus Hampton was seen today for follow-up. Diagnoses and all orders for this visit:    Arthrodesis status  -     XR LUMBAR SPINE (2-3 VIEWS); Future             No follow-up provider specified. Electronically signed by Renetta Yin MD        Elements of this note were created using speech recognition software. As such, errors of speech recognition may be present.

## 2022-08-30 NOTE — LETTER
Kj Babak  1955  67 y.o.  [id number]    Diagnosis Code:                              RT                  LT                  BILAT    DDDZ   LBP   L/S NEURITIS   L SPRAIN   SPONDY   L STENOSIS   L DISC   POST MONTENEGRO   CRONIC PAIN    SCOLI   LIMB PAIN   TH PAIN   SI JOINT   C DDDZ   C STENOSIS   C DISC   BRACH NEUR   NECK PAIN    CTS   COCCYX   OSTEO   COMP FX   HIP BURS   POST FUS   POST NON   SH PAIN   ARM PAIN    OTHER____________________________________________________________      Radiographic Studies:    CERV/ THORACIC/ LUMBAR MRI       WITH/  W/O  Contrast              _____________________Discogram    CT /Myelogram of _______________                  NCS/EMG  Upper / Lower Extremity________________    MRI of _______________________                     Other______________________    Injections:     ________________________ESI/ SNRB/ FACET                     _______________________Rhizotomies     Authorization to stop blood thinners: _____________________________________________________      Medications:    __________________Sterpred DS                                    ___________________Gabapentin QD/ BID/ TID    __________________Norco/Tramadol   QD / BID / TID    ____________________NSAIDS  QD / BID / TID    __________________Flexeril QD/ BID/ TID                           ____________________Other      Visit Charges:    Recheck 2           Recheck 3               Recheck 4              Recheck 5      INJ ______________________    New PT 2            New PT 3                New PT 4               New PT 5          Pre-op / Post-op      Physical Therapy:    Lumbar  /  Cervical                     ___________________________ (Traction / Ultrasound, Dry Needling)       Referral: Rehana Brocks /  [facility]   /OTHER: __________________________________________      Follow-up with SEL______________________________________________________________      Work Note: _____________________________________________________________________

## 2022-09-26 ENCOUNTER — OFFICE VISIT (OUTPATIENT)
Dept: ORTHOPEDIC SURGERY | Age: 67
End: 2022-09-26
Payer: MEDICARE

## 2022-09-26 DIAGNOSIS — M20.21 HALLUX RIGIDUS, RIGHT FOOT: Primary | ICD-10-CM

## 2022-09-26 DIAGNOSIS — M79.672 PAIN IN LEFT FOOT: ICD-10-CM

## 2022-09-26 DIAGNOSIS — M79.671 PAIN IN RIGHT FOOT: ICD-10-CM

## 2022-09-26 DIAGNOSIS — M20.22 HALLUX RIGIDUS OF BOTH FEET: ICD-10-CM

## 2022-09-26 DIAGNOSIS — M20.22 HALLUX RIGIDUS, LEFT FOOT: ICD-10-CM

## 2022-09-26 DIAGNOSIS — M20.21 HALLUX RIGIDUS OF BOTH FEET: ICD-10-CM

## 2022-09-26 PROCEDURE — 1123F ACP DISCUSS/DSCN MKR DOCD: CPT | Performed by: ORTHOPAEDIC SURGERY

## 2022-09-26 PROCEDURE — G8420 CALC BMI NORM PARAMETERS: HCPCS | Performed by: ORTHOPAEDIC SURGERY

## 2022-09-26 PROCEDURE — 1036F TOBACCO NON-USER: CPT | Performed by: ORTHOPAEDIC SURGERY

## 2022-09-26 PROCEDURE — 1090F PRES/ABSN URINE INCON ASSESS: CPT | Performed by: ORTHOPAEDIC SURGERY

## 2022-09-26 PROCEDURE — 99214 OFFICE O/P EST MOD 30 MIN: CPT | Performed by: ORTHOPAEDIC SURGERY

## 2022-09-26 PROCEDURE — 3017F COLORECTAL CA SCREEN DOC REV: CPT | Performed by: ORTHOPAEDIC SURGERY

## 2022-09-26 PROCEDURE — G8400 PT W/DXA NO RESULTS DOC: HCPCS | Performed by: ORTHOPAEDIC SURGERY

## 2022-09-26 PROCEDURE — G8428 CUR MEDS NOT DOCUMENT: HCPCS | Performed by: ORTHOPAEDIC SURGERY

## 2022-09-26 PROCEDURE — 20600 DRAIN/INJ JOINT/BURSA W/O US: CPT | Performed by: ORTHOPAEDIC SURGERY

## 2022-09-26 RX ORDER — METHYLPREDNISOLONE ACETATE 40 MG/ML
40 INJECTION, SUSPENSION INTRA-ARTICULAR; INTRALESIONAL; INTRAMUSCULAR; SOFT TISSUE ONCE
Status: COMPLETED | OUTPATIENT
Start: 2022-09-26 | End: 2022-09-26

## 2022-09-26 RX ADMIN — METHYLPREDNISOLONE ACETATE 40 MG: 40 INJECTION, SUSPENSION INTRA-ARTICULAR; INTRALESIONAL; INTRAMUSCULAR; SOFT TISSUE at 15:16

## 2022-09-26 RX ADMIN — METHYLPREDNISOLONE ACETATE 40 MG: 40 INJECTION, SUSPENSION INTRA-ARTICULAR; INTRALESIONAL; INTRAMUSCULAR; SOFT TISSUE at 15:15

## 2022-09-26 NOTE — PROGRESS NOTES
Name: Gissel Cisneros  YOB: 1955  Gender: female  MRN: [id number]    Summary:   Bilateral hallux rigidus       CC: Foot Pain (Bilateral foot pain requesting injections today )       HPI: Gissel Cisneros is a 69 y.o. female who presents with Foot Pain (Bilateral foot pain requesting injections today )  . This patient presents back to the office today with continued plaints of bilateral foot pain. History was obtained by Patient     ROS/Meds/PSH/PMH/FH/SH: I personally reviewed the patients standard intake form. Below are the pertinents    Tobacco:  reports that she has never smoked. She has never used smokeless tobacco.  Diabetes: None      Physical Examination: On normal    Exam of the bilateral feet shows swelling and tenderness palpation limited range of motion of bilateral first MTP joints    Imaging:   No imaging reviewed           Apoorva Mcfarland III, MD           Assessment:   Bilateral hallux rigidus    Treatment Plan:   4 This is a chronic illness/condition with exacerbation and progression  Treatment at this time: Minor Procedure: Injection done today: The patient understands the risks and complications associated with injection. After sterile prep of the area, the Bilateral big toe MTP joint was injected with 3 cc. of Xylocaine and 3 cc. of steroid. . 40mg Depo Medrol was the steroid used. Patient tolerated it well. I discussed the risk of infection and skin blanching. I told the be patient be careful about the symptoms of hyperglycemia such as GI distress, polyuria, excessive thirst and lethargy. If these symptoms occur they should present to an primary care doctor or urgent facility  Studies ordered: NO XR needed @ Next Visit    Weight-bearing status: WBAT        Return to work/work restrictions: none  No medications given    She will continue with nonoperative treatment in effort to avoid big toe fusion return as needed.

## 2022-09-27 ENCOUNTER — TELEPHONE (OUTPATIENT)
Dept: ORTHOPEDIC SURGERY | Age: 67
End: 2022-09-27

## 2022-09-27 NOTE — TELEPHONE ENCOUNTER
Pt  called and  ask  if  you  have  any more  samples of  Pennsaid ?   And  if  so  can  she  pick them up  today and at  [facility]    Please  call pt

## 2022-09-28 NOTE — TELEPHONE ENCOUNTER
I called patient and told her I left samples of Pennsaid with the  of our [facility] office for her. I told her they are the last two samples that we have and we are not sure if we will get them again. She said she will come pick them up and expressed understanding.

## 2022-10-07 ENCOUNTER — OFFICE VISIT (OUTPATIENT)
Dept: ORTHOPEDIC SURGERY | Age: 67
End: 2022-10-07
Payer: MEDICARE

## 2022-10-07 DIAGNOSIS — Z98.1 STATUS POST LUMBAR SPINAL ARTHRODESIS: Primary | ICD-10-CM

## 2022-10-07 PROCEDURE — 1090F PRES/ABSN URINE INCON ASSESS: CPT | Performed by: ORTHOPAEDIC SURGERY

## 2022-10-07 PROCEDURE — G8427 DOCREV CUR MEDS BY ELIG CLIN: HCPCS | Performed by: ORTHOPAEDIC SURGERY

## 2022-10-07 PROCEDURE — 3017F COLORECTAL CA SCREEN DOC REV: CPT | Performed by: ORTHOPAEDIC SURGERY

## 2022-10-07 PROCEDURE — G8420 CALC BMI NORM PARAMETERS: HCPCS | Performed by: ORTHOPAEDIC SURGERY

## 2022-10-07 PROCEDURE — 99213 OFFICE O/P EST LOW 20 MIN: CPT | Performed by: ORTHOPAEDIC SURGERY

## 2022-10-07 PROCEDURE — G8484 FLU IMMUNIZE NO ADMIN: HCPCS | Performed by: ORTHOPAEDIC SURGERY

## 2022-10-07 PROCEDURE — 1123F ACP DISCUSS/DSCN MKR DOCD: CPT | Performed by: ORTHOPAEDIC SURGERY

## 2022-10-07 PROCEDURE — 1036F TOBACCO NON-USER: CPT | Performed by: ORTHOPAEDIC SURGERY

## 2022-10-07 PROCEDURE — G8400 PT W/DXA NO RESULTS DOC: HCPCS | Performed by: ORTHOPAEDIC SURGERY

## 2022-10-07 NOTE — PROGRESS NOTES
Name: Darryl Homans  YOB: 1955  Gender: female  MRN: [id number]  Age: 69 y.o. Chief Complaint: Lumbar spine surgery follow up    History of Present Illness:      Darryl Homans  is here for 6-month follow up of her  DLIF surgery done at an adjacent level by Dr. Val Torrez. She has been having a lot of bilateral bursal pain. She is in pain management. She has had recent radiographs. Medications:       Current Outpatient Medications:     amLODIPine (NORVASC) 5 MG tablet, TAKE ONE TABLET BY MOUTH ONE TIME DAILY, Disp: , Rfl:     buPROPion (WELLBUTRIN XL) 300 MG extended release tablet, TAKE ONE TABLET BY MOUTH ONE TIME DAILY, Disp: , Rfl:     busPIRone (BUSPAR) 15 MG tablet, Take 15 mg by mouth 2 times daily, Disp: , Rfl:     celecoxib (CELEBREX) 100 MG capsule, Take 100 mg by mouth 2 times daily, Disp: , Rfl:     vitamin D (CHOLECALCIFEROL) 25 MCG (1000 UT) TABS tablet, Take 5,000 Units by mouth daily, Disp: , Rfl:     cyanocobalamin 1000 MCG tablet, Take 1,000 mcg by mouth daily, Disp: , Rfl:     cyclobenzaprine (FLEXERIL) 10 MG tablet, Take 10 mg by mouth 3 times daily as needed, Disp: , Rfl:     dextromethorphan-guaiFENesin (MUCINEX DM)  MG per extended release tablet, Take 1 tablet by mouth 2 times daily, Disp: , Rfl:     estrogens, conjugated, (PREMARIN) 1.25 MG tablet, TAKE 1 TABLET BY MOUTH DAILY, Disp: , Rfl:     ferrous sulfate (IRON 325) 325 (65 Fe) MG tablet, Take 1 tablet by mouth every morning (before breakfast), Disp: , Rfl:     folic acid (FOLVITE) 1 MG tablet, Take 1 mg by mouth daily, Disp: , Rfl:     HYDROcodone-acetaminophen (NORCO)  MG per tablet, Take 1 tablet by mouth 4 times daily as needed. , Disp: , Rfl:     hydrOXYzine (VISTARIL) 100 MG capsule, Take 100 mg by mouth 3 times daily as needed, Disp: , Rfl:     LORazepam (ATIVAN) 1 MG tablet, TAKE ONE TABLET BY MOUTH TWICE A DAY AS NEEDED FOR ANXIETY, Disp: , Rfl:     medroxyPROGESTERone (PROVERA) 5 MG tablet, Take 5 mg by mouth daily, Disp: , Rfl:     pantoprazole (PROTONIX) 40 MG tablet, TAKE ONE TABLET BY MOUTH ONE TIME DAILY, Disp: , Rfl:     pregabalin (LYRICA) 75 MG capsule, Take 75 mg by mouth 3 times daily. , Disp: , Rfl:     Simethicone 125 MG CAPS, Take 125 mg by mouth 4 times daily as needed, Disp: , Rfl:     traZODone (DESYREL) 50 MG tablet, Take 1-2 tabs po QHS PRN, Disp: , Rfl:     valsartan (DIOVAN) 160 MG tablet, TAKE ONE TABLET BY MOUTH ONE TIME DAILY, Disp: , Rfl:     zolpidem (AMBIEN) 10 MG tablet, TAKE ONE TABLET BY MOUTH AT BEDTIME, Disp: , Rfl:     Allergies: Allergies   Allergen Reactions    Latex Itching    Celecoxib Other (See Comments)     constipation  Other reaction(s): Other- (not listed) - Allergy  constipation    Mirtazapine Other (See Comments)     Greater than expected reaction  Other reaction(s): Drowsiness-Intolerance  Greater than expected reaction         Physical Exam:      Respirations are unlabored and there is no evidence of cyanosis      Wound is healed nicely without erythema, drainage or underlying fluctuance    Gait is normal    There is no focal motor deficit. Radiographic Studies:     X-rays including AP and lateral views of the lumbar spine were reviewed from her previous visit and reveal appropriate postoperative changes status post lumbar laminectomy and fusion without evidence for hardware failure or decompensation. Diagnosis:    Status post lumbar fusion    Assessment/Plan: This patient is following the expected course following the spine surgery. She can continue to gradually increase activities as tolerated. I will have her return for follow up at her 1 year surgical anniversary in March.            Sharri Hong MD    10/07/22  12:53 PM

## 2022-10-10 DIAGNOSIS — M25.561 RIGHT KNEE PAIN, UNSPECIFIED CHRONICITY: Primary | ICD-10-CM

## 2022-10-13 ENCOUNTER — OFFICE VISIT (OUTPATIENT)
Dept: ORTHOPEDIC SURGERY | Age: 67
End: 2022-10-13
Payer: MEDICARE

## 2022-10-13 DIAGNOSIS — M17.11 PRIMARY OSTEOARTHRITIS OF RIGHT KNEE: ICD-10-CM

## 2022-10-13 DIAGNOSIS — M25.562 PAIN IN BOTH KNEES, UNSPECIFIED CHRONICITY: ICD-10-CM

## 2022-10-13 DIAGNOSIS — M25.561 RIGHT KNEE PAIN, UNSPECIFIED CHRONICITY: Primary | ICD-10-CM

## 2022-10-13 DIAGNOSIS — M25.561 PAIN IN BOTH KNEES, UNSPECIFIED CHRONICITY: ICD-10-CM

## 2022-10-13 PROCEDURE — 20610 DRAIN/INJ JOINT/BURSA W/O US: CPT | Performed by: ORTHOPAEDIC SURGERY

## 2022-10-13 RX ORDER — METHYLPREDNISOLONE ACETATE 40 MG/ML
40 INJECTION, SUSPENSION INTRA-ARTICULAR; INTRALESIONAL; INTRAMUSCULAR; SOFT TISSUE ONCE
Status: COMPLETED | OUTPATIENT
Start: 2022-10-13 | End: 2022-10-13

## 2022-10-13 RX ADMIN — METHYLPREDNISOLONE ACETATE 40 MG: 40 INJECTION, SUSPENSION INTRA-ARTICULAR; INTRALESIONAL; INTRAMUSCULAR; SOFT TISSUE at 14:16

## 2022-10-13 NOTE — PROGRESS NOTES
Patient ID:  Chase Garrison  [id number]  58 y.o.  1955    Today: 2022          Chief Complaint:  Right Knee pain    HPI:       Chase Garrison is a 69 y.o. female seen for evaluation and treatment of followup of right knee osteoarthritis. The patient reports pain along the joint lines, reports stiffness of the knee with prolonged inactivity, and swelling/pain at the end of the day and after increased physical activity. Generally, symptoms improve with sitting/rest. The pain affects the patients activities of daily living and quality of life. Patient reports progressive pain and instability in the knee. The pain has been ongoing for an extended period of time. Pain ranges from approximately 4-8 in a cyclical fashion with periods of acute exacerbation. Patient has attempted prior conservative treatment including medications and activity modification. Treatment to date has included OTC medications and activity modification. At one point the patient has had a steroid injection in the right knee which provided 3+ months of pain improvement.     Past Medical History:  Past Medical History:   Diagnosis Date    Abnormal Papanicolaou smear of cervix     ASCUS    Anemia     Arthritis     pt says if she has it it's mild    Bowel trouble     Chronic pain     lumbar/cervical    Depression     Ear problems     Elective      Fatigue 2012    GERD (gastroesophageal reflux disease)     controlled with med    History of lumbar laminectomy 2019    L3-5    Hypertension     controlled with med    Menopause     Miscarriage     Neck pain 2012    Pain in breast     RIGHT    Pelvis fracture, right [facility])     onset age 62    Psychiatric disorder     depression and anxiety    Reflux esophagitis 2012    Sinus congestion 2014       Past Surgical History:  Past Surgical History:   Procedure Laterality Date    BACK SURGERY  2019    CATARACT REMOVAL Bilateral 2017    with lens replacement    COLONOSCOPY  02/05/2009    NEUROLOGICAL SURGERY  10/17/2018    cervical fusion, anterior and posterior    ORTHOPEDIC SURGERY  2022    Back surgery     ORTHOPEDIC SURGERY Left 1998    FOOT- pin in great toe for not being able to move it    [address] BIOPSY  2016    negative          Medications:     Prior to Admission medications    Medication Sig Start Date End Date Taking? Authorizing Provider   amLODIPine (NORVASC) 5 MG tablet TAKE ONE TABLET BY MOUTH ONE TIME DAILY 12/30/19   Ar Automatic Reconciliation   buPROPion (WELLBUTRIN XL) 300 MG extended release tablet TAKE ONE TABLET BY MOUTH ONE TIME DAILY 11/26/19   Ar Automatic Reconciliation   busPIRone (BUSPAR) 15 MG tablet Take 15 mg by mouth 2 times daily 10/11/21   Ar Automatic Reconciliation   celecoxib (CELEBREX) 100 MG capsule Take 100 mg by mouth 2 times daily 10/12/21   Ar Automatic Reconciliation   vitamin D (CHOLECALCIFEROL) 25 MCG (1000 UT) TABS tablet Take 5,000 Units by mouth daily    Ar Automatic Reconciliation   cyanocobalamin 1000 MCG tablet Take 1,000 mcg by mouth daily 3/8/22   Ar Automatic Reconciliation   cyclobenzaprine (FLEXERIL) 10 MG tablet Take 10 mg by mouth 3 times daily as needed 3/3/21   Ar Automatic Reconciliation   dextromethorphan-guaiFENesin (MUCINEX DM)  MG per extended release tablet Take 1 tablet by mouth 2 times daily    Ar Automatic Reconciliation   estrogens, conjugated, (PREMARIN) 1.25 MG tablet TAKE 1 TABLET BY MOUTH DAILY 12/17/19   Ar Automatic Reconciliation   ferrous sulfate (IRON 325) 325 (65 Fe) MG tablet Take 1 tablet by mouth every morning (before breakfast)    Ar Automatic Reconciliation   folic acid (FOLVITE) 1 MG tablet Take 1 mg by mouth daily 3/8/22   Ar Automatic Reconciliation   HYDROcodone-acetaminophen (NORCO)  MG per tablet Take 1 tablet by mouth 4 times daily as needed.     Ar Automatic Reconciliation   hydrOXYzine (VISTARIL) 100 MG capsule Take 100 mg by mouth 3 times daily as needed    Ar Automatic Reconciliation   LORazepam (ATIVAN) 1 MG tablet TAKE ONE TABLET BY MOUTH TWICE A DAY AS NEEDED FOR ANXIETY 9/28/20   Ar Automatic Reconciliation   medroxyPROGESTERone (PROVERA) 5 MG tablet Take 5 mg by mouth daily 2/21/22   Ar Automatic Reconciliation   pantoprazole (PROTONIX) 40 MG tablet TAKE ONE TABLET BY MOUTH ONE TIME DAILY 8/24/20   Ar Automatic Reconciliation   pregabalin (LYRICA) 75 MG capsule Take 75 mg by mouth 3 times daily. 8/31/21   Ar Automatic Reconciliation   Simethicone 125 MG CAPS Take 125 mg by mouth 4 times daily as needed    Ar Automatic Reconciliation   traZODone (DESYREL) 50 MG tablet Take 1-2 tabs po QHS PRN 1/3/22   Ar Automatic Reconciliation   valsartan (DIOVAN) 160 MG tablet TAKE ONE TABLET BY MOUTH ONE TIME DAILY 12/17/19   Ar Automatic Reconciliation   zolpidem (AMBIEN) 10 MG tablet TAKE ONE TABLET BY MOUTH AT BEDTIME 6/29/20   Ar Automatic Reconciliation       Family History:     Family History   Problem Relation Age of Onset    Rheum Arthritis Brother     Breast Cancer Sister 62        HAD BILATERAL MASTECTOMY 06/2013    Cancer Sister         breast    Anemia Father         Pernicious anemia    Heart Disease Father     Hypertension Mother     Breast Cancer Mother 80    Heart Disease Mother        Social History:      Social History     Tobacco Use    Smoking status: Never    Smokeless tobacco: Never   Substance Use Topics    Alcohol use: No           Allergies: Allergies   Allergen Reactions    Latex Itching    Celecoxib Other (See Comments)     constipation  Other reaction(s): Other- (not listed) - Allergy  constipation    Mirtazapine Other (See Comments)     Greater than expected reaction  Other reaction(s): Drowsiness-Intolerance  Greater than expected reaction          Vitals: There were no vitals taken for this visit.     ROS:   Review of Systems         Objective:   General: Patient is awake and in no acute distress  Psych: Mood and affect appropriate  HEENT: Normocephalic. Atramatic. Pupils equal, round and reactive. Sclera normal.   Neck: Supple without obvious mass   Chest: Symmetric  Lungs:  Breathing non-labored. No tachypnea noted. Abdomen: Soft on gross examination without obvious distention. Neuro: No obvious neurologic deficit. Grossly moves bilateral upper extremities without motor or sensory deficits. No gross weakness noted in the lower extremities. No hyporeflexia or hyperreflexia noted. Vascular: No gross arterial or venous deficiency noted. DP and PT pulses are palpable in the lower extremities  Lymphatic: No lymphedema noted in the lower extremities. Skin: No prior incisions noted about the right knee. No obvious rashes noted about the area. No skin changes noted about the knee or about the adjacent thigh or leg. Extremities:  Patient ambulates with an antalgic gait. There is pain with ROM of the left knee. Range of motion is 0-120. Trace effusion noted in the knee. Patellofemoral crepitus is present. Distally the patient shows no neurologic deficit. Xrays (obtained either today or previously):    XR Knee 3/4 View  Views: AP knee, skiers PA, lateral knee, sunrise view right knee  Clinical indication: Right Knee Pain   Findings: Evidence of mild osteoarthritic changes. The joint line appears to be fairly well maintained although there appears to be some narrowing of the space. No significant osteophyte formation noted. No advanced subchondral cyst formation noted or sclerosis appreciated. Impression:Knee Osteoarthritis    Omar Garcia MD      Assessment:   1. Arthritis of the Right knee    Plan:   Differential diagnosis and treatment options have been discussed with the patient. Risks, benefits and alternatives of each were discussed and patient questions answered.  We discussed oral anti-inflammatory medications, activity modifications, physical therapy, corticosteroid injections, weight loss (if appropriate), and surgery. We discussed that given the degenerative nature of the joint that in most cases surgery is the definitive treatment for this condition. We did discuss some of the details of surgery along with some of the risks, benefits and alternatives. At this point the patient is not a candidate for surgery however they would like to try to postpone surgery at this point in time if possible. At this point the patient has failed the aforementioned treatment modalities. At this point the patient would like to proceed with Corticosteroid Injection. We discussed potential risks of steroid injection including but not limited to steroid flare with an associated increase in pain, fat necrosis, skin discoloration around the injection site, temporary increase in blood sugars in diabetic patients and possible facial flushing after injection. TREATMENT:    Steroid Injection - Risks, benefits, and alternatives of corticosteroid injection were discussed. After any questions were address the patient wished to proceed with injection. After prepping the injection site and right knee was injected with 1cc of 40mg Depomedrol/3cc marcaine. Patient tolerated the procedure well. Patient is instructed to ice the joint for next few days if they experience discomfort. The patient will followup as directed or as needed.     Signed By: Reymundo Rico MD  October 13, 2022

## 2022-12-06 RX ORDER — FLUCONAZOLE 150 MG/1
150 TABLET ORAL DAILY
Qty: 3 TABLET | Refills: 0 | Status: SHIPPED | OUTPATIENT
Start: 2022-12-06 | End: 2022-12-09

## 2022-12-14 ENCOUNTER — TELEPHONE (OUTPATIENT)
Dept: ORTHOPEDIC SURGERY | Age: 67
End: 2022-12-14

## 2022-12-14 ENCOUNTER — OFFICE VISIT (OUTPATIENT)
Dept: OBGYN CLINIC | Age: 67
End: 2022-12-14
Payer: MEDICARE

## 2022-12-14 VITALS
SYSTOLIC BLOOD PRESSURE: 138 MMHG | WEIGHT: 143.8 LBS | HEIGHT: 63 IN | BODY MASS INDEX: 25.48 KG/M2 | DIASTOLIC BLOOD PRESSURE: 82 MMHG

## 2022-12-14 DIAGNOSIS — N89.8 VAGINAL IRRITATION: Primary | ICD-10-CM

## 2022-12-14 PROCEDURE — G8484 FLU IMMUNIZE NO ADMIN: HCPCS | Performed by: OBSTETRICS & GYNECOLOGY

## 2022-12-14 PROCEDURE — 3074F SYST BP LT 130 MM HG: CPT | Performed by: OBSTETRICS & GYNECOLOGY

## 2022-12-14 PROCEDURE — 3078F DIAST BP <80 MM HG: CPT | Performed by: OBSTETRICS & GYNECOLOGY

## 2022-12-14 PROCEDURE — G8427 DOCREV CUR MEDS BY ELIG CLIN: HCPCS | Performed by: OBSTETRICS & GYNECOLOGY

## 2022-12-14 PROCEDURE — 1090F PRES/ABSN URINE INCON ASSESS: CPT | Performed by: OBSTETRICS & GYNECOLOGY

## 2022-12-14 PROCEDURE — 3017F COLORECTAL CA SCREEN DOC REV: CPT | Performed by: OBSTETRICS & GYNECOLOGY

## 2022-12-14 PROCEDURE — 1036F TOBACCO NON-USER: CPT | Performed by: OBSTETRICS & GYNECOLOGY

## 2022-12-14 PROCEDURE — G8417 CALC BMI ABV UP PARAM F/U: HCPCS | Performed by: OBSTETRICS & GYNECOLOGY

## 2022-12-14 PROCEDURE — 1123F ACP DISCUSS/DSCN MKR DOCD: CPT | Performed by: OBSTETRICS & GYNECOLOGY

## 2022-12-14 PROCEDURE — 99214 OFFICE O/P EST MOD 30 MIN: CPT | Performed by: OBSTETRICS & GYNECOLOGY

## 2022-12-14 PROCEDURE — G8400 PT W/DXA NO RESULTS DOC: HCPCS | Performed by: OBSTETRICS & GYNECOLOGY

## 2022-12-14 RX ORDER — CONJUGATED ESTROGENS 0.62 MG/G
0.5 CREAM VAGINAL DAILY
Qty: 30 G | Refills: 3 | Status: SHIPPED | OUTPATIENT
Start: 2022-12-14

## 2022-12-14 RX ORDER — MEDROXYPROGESTERONE ACETATE 5 MG/1
5 TABLET ORAL DAILY
Qty: 30 TABLET | Refills: 11 | Status: SHIPPED | OUTPATIENT
Start: 2022-12-14

## 2022-12-14 NOTE — TELEPHONE ENCOUNTER
She will need to make an appointment to discuss with Dr Kang Strange.  Sent back to appointments to make appointment for next available

## 2022-12-14 NOTE — TELEPHONE ENCOUNTER
Pt  now  feels  she  needs  an xray  on her  RIGHT foot. He is  seeing her  for  her  left foot. Can she  be  seen  soon  for  this  new  issue?

## 2022-12-14 NOTE — PROGRESS NOTES
The patient presents to the office today with over one-month history of vaginal irritation, both externally and internally. She states that she started having irritation with itching internally and then, she noticed a discharge which then allowed the itching go externally. She used a prescription for Diflucan that she previously had which improved her symptoms somewhat but not completely. She has been dealing with these residual symptoms over the last three to four weeks. She has a chapped appearing external genitalia with labial agglutination which was previously noticed when external estrogen cream was prescribed several months ago. Internally, her vagina appears reasonably well estrogenized with copious amount of whitish watery vaginal discharge. Wet prep is inconclusive. NuSwab vaginitis is collected. The patient is counseled that we will await these results and notify her of any further disposition these required. I have refilled her Provera 5 mg tablets per her request.  I have also prescribed Premarin vaginal cream to restart using externally in an effort to alleviate her symptoms. We will prescribe other prescription medication when these results are revealed. The patient is amenable to this plan. We will see her back on a p.r.n. basis or for annual screening as indicated.

## 2022-12-14 NOTE — TELEPHONE ENCOUNTER
Pt  said  she  has now had  2 injections with  dr Jane Arias in  [facility] and they have  not  helped and  she  wants to  know  her  next  form  of  treatment

## 2022-12-15 ENCOUNTER — TELEPHONE (OUTPATIENT)
Dept: ORTHOPEDIC SURGERY | Age: 67
End: 2022-12-15

## 2022-12-15 NOTE — TELEPHONE ENCOUNTER
She is calling about getting an xray on her right foot. It is hurting across the top of her foot. She has had no injury. She will be near the office around 2:00 today if she could get an xray.  Try her at [phone] first.

## 2022-12-17 LAB
A VAGINAE DNA VAG QL NAA+PROBE: NORMAL SCORE
BVAB2 DNA VAG QL NAA+PROBE: NORMAL SCORE
C ALBICANS DNA VAG QL NAA+PROBE: NEGATIVE
C GLABRATA DNA VAG QL NAA+PROBE: NEGATIVE
MEGA1 DNA VAG QL NAA+PROBE: NORMAL SCORE
SPECIMEN SOURCE: NORMAL
T VAGINALIS RRNA SPEC QL NAA+PROBE: NEGATIVE

## 2022-12-21 ENCOUNTER — OFFICE VISIT (OUTPATIENT)
Dept: ORTHOPEDIC SURGERY | Age: 67
End: 2022-12-21

## 2022-12-21 DIAGNOSIS — M20.21 HALLUX RIGIDUS OF BOTH FEET: ICD-10-CM

## 2022-12-21 DIAGNOSIS — M84.374A STRESS FRACTURE OF METATARSAL BONE OF RIGHT FOOT, INITIAL ENCOUNTER: ICD-10-CM

## 2022-12-21 DIAGNOSIS — M20.22 HALLUX RIGIDUS OF BOTH FEET: ICD-10-CM

## 2022-12-21 DIAGNOSIS — M79.671 RIGHT FOOT PAIN: Primary | ICD-10-CM

## 2022-12-21 RX ORDER — METHYLPREDNISOLONE ACETATE 40 MG/ML
40 INJECTION, SUSPENSION INTRA-ARTICULAR; INTRALESIONAL; INTRAMUSCULAR; SOFT TISSUE ONCE
Status: COMPLETED | OUTPATIENT
Start: 2022-12-21 | End: 2022-12-21

## 2022-12-21 RX ADMIN — METHYLPREDNISOLONE ACETATE 40 MG: 40 INJECTION, SUSPENSION INTRA-ARTICULAR; INTRALESIONAL; INTRAMUSCULAR; SOFT TISSUE at 14:05

## 2022-12-21 RX ADMIN — METHYLPREDNISOLONE ACETATE 40 MG: 40 INJECTION, SUSPENSION INTRA-ARTICULAR; INTRALESIONAL; INTRAMUSCULAR; SOFT TISSUE at 14:07

## 2022-12-21 NOTE — PROGRESS NOTES
Name: Miguel Angel Martins  YOB: 1955  Gender: female  MRN: [id number]    Summary:   Bilateral hallux rigidus and new right fourth metatarsal stress reaction       CC: Follow-up (Right foot  x-ray taken in office today)       HPI: Miguel Angel Martins is a 69 y.o. female who presents with Follow-up (Right foot  x-ray taken in office today)  . This patient presents back to the office today with increasing pain located the lateral aspect of her right foot. Is a new problem. History was obtained by Patient     ROS/Meds/PSH/PMH/FH/SH: I personally reviewed the patients standard intake form. Below are the pertinents    Tobacco:  reports that she has never smoked. She has never used smokeless tobacco.  Diabetes: None      Physical Examination:  Examination of bilateral feet shows significant range of motion limitations with crepitus in the bilateral first MTP joints. Exam of the right foot also shows tenderness to palpation located at the fourth metatarsal area. She has palpable pulses and intact sensation. Imaging:   I independently interpreted XR taken today  Right foot XR: AP, Lateral, Oblique views     ICD-10-CM    1. Right foot pain  M79.671 XR FOOT RIGHT (MIN 3 VIEWS)     methylPREDNISolone acetate (DEPO-MEDROL) injection 40 mg     methylPREDNISolone acetate (DEPO-MEDROL) injection 40 mg     DRAIN/INJECT INTERMEDIATE JOINT/BURSA     CANCELED: DRAIN/INJECT INTERMEDIATE JOINT/BURSA      2. Stress fracture of metatarsal bone of right foot, initial encounter  M84.374A       3.  Hallux rigidus of both feet  M20.21     M20.22          Report: AP, lateral, oblique x-ray of the right foot demonstrates no new fracture    Impression: No new fracture   Cheng Yeager III, MD           Assessment:   Lateral hallux rigidus with new onset fourth metatarsal stress reaction    Treatment Plan:   4 This is a chronic illness/condition with exacerbation and progression  Treatment at this time: Minor Procedure: Injection done today: The patient understands the risks and complications associated with injection. After sterile prep of the area, the Bilateral big toe MTP joint was injected with 3 cc. of Xylocaine and 3 cc. of steroid. . 40mg Depo Medrol was the steroid used. Patient tolerated it well. I discussed the risk of infection and skin blanching. I told the be patient be careful about the symptoms of hyperglycemia such as GI distress, polyuria, excessive thirst and lethargy. If these symptoms occur they should present to an primary care doctor or urgent facility  Studies ordered: NO XR needed @ Next Visit    Weight-bearing status: WBAT        Return to work/work restrictions: none  No medications given    She will continue with her shoewear modifications. We discussed the first MTP arthrodesis on the right with calcaneal autograft in the future.

## 2022-12-29 ENCOUNTER — HOSPITAL ENCOUNTER (OUTPATIENT)
Dept: MAMMOGRAPHY | Age: 67
Discharge: HOME | End: 2022-12-29
Payer: MEDICARE

## 2022-12-29 ENCOUNTER — TELEPHONE (OUTPATIENT)
Dept: OBGYN CLINIC | Age: 67
End: 2022-12-29

## 2022-12-29 DIAGNOSIS — Z12.31 ENCOUNTER FOR SCREENING MAMMOGRAM FOR MALIGNANT NEOPLASM OF BREAST: ICD-10-CM

## 2022-12-29 PROCEDURE — 77067 SCR MAMMO BI INCL CAD: CPT

## 2022-12-29 NOTE — TELEPHONE ENCOUNTER
Pt was given Premarin vaginal cream and a new prescription at her recent visit. Directions state to use . 5 grams daily. She would like to know if you want her to continue using it daily or just 2-3 times. States that she believes it is helping and mentions that she is still having hot flashes. She also mentions that she paid $111 for the prescription. Pt informed that per Dr. Randell Apodaca, she can use as needed to prevent itching. Pt v/u and states she will try to use 2-3 times weekly to save on cost. Pt advised to call back with further questions.

## 2022-12-30 ENCOUNTER — TELEPHONE (OUTPATIENT)
Dept: ORTHOPEDIC SURGERY | Age: 67
End: 2022-12-30

## 2022-12-30 NOTE — TELEPHONE ENCOUNTER
Informed patient Dr. Adilia Pan was out of the office until next week but that I would speak to him and return her call.

## 2022-12-30 NOTE — TELEPHONE ENCOUNTER
Pt called and was seen on 12/21. Her foot still isn't any better and is having trouble walking.  Please call pt to discuss

## 2023-01-10 ENCOUNTER — TELEPHONE (OUTPATIENT)
Dept: ORTHOPEDIC SURGERY | Age: 68
End: 2023-01-10

## 2023-01-10 ENCOUNTER — TELEPHONE (OUTPATIENT)
Dept: OBGYN CLINIC | Age: 68
End: 2023-01-10

## 2023-01-10 NOTE — TELEPHONE ENCOUNTER
Pt calling with request to come in for hormone labs. States that she has stopped using the Premarin cream due to extreme hot flashes which have decreased since she d/c'd it. States she feels that something is off and she would like to have her labs checked. Ok to bring in and which labs would you like?

## 2023-01-10 NOTE — TELEPHONE ENCOUNTER
Patient requested MEF and MAT notes to [facility] [id number]

## 2023-01-11 NOTE — TELEPHONE ENCOUNTER
Unlikely that vaginal hormones can effect hot flashes either way. I recommend PCP followup. PATTI      Pt informed of the above via VM and advised to call back with further questions.

## 2023-01-13 ENCOUNTER — OFFICE VISIT (OUTPATIENT)
Dept: ORTHOPEDIC SURGERY | Age: 68
End: 2023-01-13

## 2023-01-13 DIAGNOSIS — Z98.1 STATUS POST LUMBAR SPINAL ARTHRODESIS: Primary | ICD-10-CM

## 2023-01-13 NOTE — PROGRESS NOTES
Name: Bucky Haji  YOB: 1955  Gender: female  MRN: [id number]  Age: 69 y.o. Chief Complaint: Lumbar spine surgery follow up    History of Present Illness:      Bucky Haji  is here for 1 year follow up of her  DLIF surgery. She reports significant relief of preoperative lower extremity pain, weakness and parasthesias. There is the expected residual back stiffness. Medications:       Current Outpatient Medications:     medroxyPROGESTERone (PROVERA) 5 MG tablet, Take 1 tablet by mouth daily, Disp: 30 tablet, Rfl: 11    estrogens conjugated (PREMARIN) 0.625 MG/GM CREA vaginal cream, Place 0.5 g vaginally daily, Disp: 30 g, Rfl: 3    amLODIPine (NORVASC) 5 MG tablet, TAKE ONE TABLET BY MOUTH ONE TIME DAILY, Disp: , Rfl:     buPROPion (WELLBUTRIN XL) 300 MG extended release tablet, TAKE ONE TABLET BY MOUTH ONE TIME DAILY, Disp: , Rfl:     busPIRone (BUSPAR) 15 MG tablet, Take 15 mg by mouth 2 times daily, Disp: , Rfl:     celecoxib (CELEBREX) 100 MG capsule, Take 100 mg by mouth 2 times daily, Disp: , Rfl:     vitamin D (CHOLECALCIFEROL) 25 MCG (1000 UT) TABS tablet, Take 5,000 Units by mouth daily, Disp: , Rfl:     cyanocobalamin 1000 MCG tablet, Take 1,000 mcg by mouth daily, Disp: , Rfl:     cyclobenzaprine (FLEXERIL) 10 MG tablet, Take 10 mg by mouth 3 times daily as needed, Disp: , Rfl:     dextromethorphan-guaiFENesin (MUCINEX DM)  MG per extended release tablet, Take 1 tablet by mouth 2 times daily, Disp: , Rfl:     estrogens, conjugated, (PREMARIN) 1.25 MG tablet, TAKE 1 TABLET BY MOUTH DAILY, Disp: , Rfl:     ferrous sulfate (IRON 325) 325 (65 Fe) MG tablet, Take 1 tablet by mouth every morning (before breakfast), Disp: , Rfl:     folic acid (FOLVITE) 1 MG tablet, Take 1 mg by mouth daily, Disp: , Rfl:     HYDROcodone-acetaminophen (NORCO)  MG per tablet, Take 1 tablet by mouth 4 times daily as needed. , Disp: , Rfl:     hydrOXYzine (VISTARIL) 100 MG capsule, Take 100 mg by mouth 3 times daily as needed, Disp: , Rfl:     LORazepam (ATIVAN) 1 MG tablet, TAKE ONE TABLET BY MOUTH TWICE A DAY AS NEEDED FOR ANXIETY, Disp: , Rfl:     pantoprazole (PROTONIX) 40 MG tablet, TAKE ONE TABLET BY MOUTH ONE TIME DAILY, Disp: , Rfl:     pregabalin (LYRICA) 75 MG capsule, Take 75 mg by mouth 3 times daily. , Disp: , Rfl:     Simethicone 125 MG CAPS, Take 125 mg by mouth 4 times daily as needed, Disp: , Rfl:     traZODone (DESYREL) 50 MG tablet, Take 1-2 tabs po QHS PRN, Disp: , Rfl:     valsartan (DIOVAN) 160 MG tablet, TAKE ONE TABLET BY MOUTH ONE TIME DAILY, Disp: , Rfl:     zolpidem (AMBIEN) 10 MG tablet, TAKE ONE TABLET BY MOUTH AT BEDTIME, Disp: , Rfl:     Allergies: Allergies   Allergen Reactions    Latex Itching    Celecoxib Other (See Comments)    Mirtazapine      Drowsiness-Intolerance           Physical Exam:     Gait is normal    Sensory testing reveals intact sensation to light touch and in the distribution of the L3-S1 dermatomes bilaterally. No focal motor deficit in the lower extremities. Radiographic Studies:     X-rays including AP and lateral views of the lumbar spine were reviewed and interpreted:     Postoperative changes are noted status post lumbar fusion with instrumentation. The hardware appears to be well-placed and intact. There is no evidence of significant osteolysis. No significant adjacent level decompensation. Alignment is maintained. Radiographic Impression:    Appropriate postoperative changes status post lumbar laminectomy and fusion without evidence for hardware failure or decompensation. Diagnosis:      ICD-10-CM    1. Status post lumbar spinal arthrodesis  Z98.1 XR LUMBAR SPINE (2-3 VIEWS)          Assessment/Plan: This patient is following the expected course following the spine surgery. She can continue activities as tolerated and return for follow up as needed.           Aure Mitchell MD    01/13/23  11:31 AM

## 2023-01-16 ENCOUNTER — TELEPHONE (OUTPATIENT)
Dept: OBGYN CLINIC | Age: 68
End: 2023-01-16

## 2023-01-16 RX ORDER — FLUCONAZOLE 150 MG/1
TABLET ORAL
Qty: 2 TABLET | Refills: 0 | Status: SHIPPED | OUTPATIENT
Start: 2023-01-16 | End: 2023-01-18

## 2023-01-16 NOTE — TELEPHONE ENCOUNTER
Patient is requesting a prescription be sent in to help with the yeast infection until she is able to be seen next week on the 25th.  Send to:    [facility] [address], [address] [place] [address]   [address]  [address]   Phone:  [phone]  Fax:  [phone]

## 2023-01-16 NOTE — PROGRESS NOTES
Per Wilman's verbal order    Orders Placed This Encounter    fluconazole (DIFLUCAN) 150 MG tablet     Sig: Take one tab orally now and repeat in 72 hours. Dispense:  2 tablet     Refill:  0      Patient scheduled to come see Dr. Teresa Foster to address symptoms 1/15/2023.

## 2023-01-16 NOTE — TELEPHONE ENCOUNTER
Patient called complaining of continued vaginal discomfort, rawness and hot flashes. Patient is going to come in to see Dr. Bonnie Mora for a pelvic exam to reassess vaginal complains. Transferred to front office for scheduling. No further questions.

## 2023-01-18 ENCOUNTER — OFFICE VISIT (OUTPATIENT)
Dept: OBGYN CLINIC | Age: 68
End: 2023-01-18
Payer: MEDICARE

## 2023-01-18 VITALS
BODY MASS INDEX: 25.37 KG/M2 | DIASTOLIC BLOOD PRESSURE: 80 MMHG | HEIGHT: 63 IN | SYSTOLIC BLOOD PRESSURE: 130 MMHG | WEIGHT: 143.2 LBS

## 2023-01-18 DIAGNOSIS — N89.8 VAGINAL IRRITATION: Primary | ICD-10-CM

## 2023-01-18 DIAGNOSIS — N95.0 POSTMENOPAUSAL BLEEDING: ICD-10-CM

## 2023-01-18 PROCEDURE — G8484 FLU IMMUNIZE NO ADMIN: HCPCS | Performed by: NURSE PRACTITIONER

## 2023-01-18 PROCEDURE — G8400 PT W/DXA NO RESULTS DOC: HCPCS | Performed by: NURSE PRACTITIONER

## 2023-01-18 PROCEDURE — G8417 CALC BMI ABV UP PARAM F/U: HCPCS | Performed by: NURSE PRACTITIONER

## 2023-01-18 PROCEDURE — 1090F PRES/ABSN URINE INCON ASSESS: CPT | Performed by: NURSE PRACTITIONER

## 2023-01-18 PROCEDURE — 1036F TOBACCO NON-USER: CPT | Performed by: NURSE PRACTITIONER

## 2023-01-18 PROCEDURE — 3079F DIAST BP 80-89 MM HG: CPT | Performed by: NURSE PRACTITIONER

## 2023-01-18 PROCEDURE — 3075F SYST BP GE 130 - 139MM HG: CPT | Performed by: NURSE PRACTITIONER

## 2023-01-18 PROCEDURE — 99214 OFFICE O/P EST MOD 30 MIN: CPT | Performed by: NURSE PRACTITIONER

## 2023-01-18 PROCEDURE — 3017F COLORECTAL CA SCREEN DOC REV: CPT | Performed by: NURSE PRACTITIONER

## 2023-01-18 PROCEDURE — G8427 DOCREV CUR MEDS BY ELIG CLIN: HCPCS | Performed by: NURSE PRACTITIONER

## 2023-01-18 PROCEDURE — 1123F ACP DISCUSS/DSCN MKR DOCD: CPT | Performed by: NURSE PRACTITIONER

## 2023-01-18 RX ORDER — MULTIVIT WITH MINERALS/LUTEIN
1000 TABLET ORAL DAILY
COMMUNITY

## 2023-01-18 NOTE — PROGRESS NOTES
The patient is a 69 y.o.  who is seen for c/o vaginal discomfort and rawness. States that she used 1 dose of the Diflucan on Monday evening and takes the second dose tonight. States symptoms are primarily external vs internal. She stopped using the Premarin cream given to her by Dr. Kathryn Corbin because she states it caused extreme hot flashes although Dr. Kathryn Corbin told her that it was unlikely this was the cause, she states the hot flashes decreased after d/c'ing the Premarin cream. Denies discharge/odor. Denies new partners. Denies burning/frequency with urination. On daily PO premarin and provera. Has not missed doses. HISTORY:      No LMP recorded. Patient is postmenopausal.  Sexual History:  has sex with males  Contraception:  post menopausal status  Current Outpatient Medications on File Prior to Visit   Medication Sig Dispense Refill    vitamin E 1000 units capsule Take 1,000 Units by mouth daily      medroxyPROGESTERone (PROVERA) 5 MG tablet Take 1 tablet by mouth daily 30 tablet 11    amLODIPine (NORVASC) 5 MG tablet TAKE ONE TABLET BY MOUTH ONE TIME DAILY      buPROPion (WELLBUTRIN XL) 300 MG extended release tablet TAKE ONE TABLET BY MOUTH ONE TIME DAILY      busPIRone (BUSPAR) 15 MG tablet Take 15 mg by mouth 2 times daily      celecoxib (CELEBREX) 100 MG capsule Take 100 mg by mouth 2 times daily      vitamin D (CHOLECALCIFEROL) 25 MCG (1000 UT) TABS tablet Take 5,000 Units by mouth daily      cyclobenzaprine (FLEXERIL) 10 MG tablet Take 10 mg by mouth 3 times daily as needed      estrogens, conjugated, (PREMARIN) 1.25 MG tablet TAKE 1 TABLET BY MOUTH DAILY      HYDROcodone-acetaminophen (NORCO)  MG per tablet Take 1 tablet by mouth 4 times daily as needed.       hydrOXYzine (VISTARIL) 100 MG capsule Take 100 mg by mouth 3 times daily as needed      LORazepam (ATIVAN) 1 MG tablet TAKE ONE TABLET BY MOUTH TWICE A DAY AS NEEDED FOR ANXIETY      pantoprazole (PROTONIX) 40 MG tablet TAKE ONE TABLET BY MOUTH ONE TIME DAILY      pregabalin (LYRICA) 75 MG capsule Take 75 mg by mouth 3 times daily. Simethicone 125 MG CAPS Take 125 mg by mouth 4 times daily as needed      valsartan (DIOVAN) 160 MG tablet TAKE ONE TABLET BY MOUTH ONE TIME DAILY      zolpidem (AMBIEN) 10 MG tablet TAKE ONE TABLET BY MOUTH AT BEDTIME      estrogens conjugated (PREMARIN) 0.625 MG/GM CREA vaginal cream Place 0.5 g vaginally daily (Patient not taking: Reported on 1/18/2023) 30 g 3     No current facility-administered medications on file prior to visit. ROS:  Feeling well. No dyspnea or chest pain on exertion. No abdominal pain, change in bowel habits, black or bloody stools. No urinary tract symptoms. GYN ROS: see above. PHYSICAL EXAM:  Blood pressure 130/80, height 5' 3\" (1.6 m), weight 143 lb 3.2 oz (65 kg). The patient appears well, alert, oriented x 3, in no distress. Lungs are clear. Heart RRR, no murmurs. Abdomen soft without tenderness, guarding, mass or organomegaly. Pelvic: VULVA: MODERATELY ERYTHEMATOUS/EXCORIATED labia; otherwise normal appearing vulva with no masses, tenderness or lesions, VAGINA: normal appearing vagina with normal color and MINIMAL DARK RED BLEEDING NOTED ON EXAM, no lesions. ASSESSMENT:  Encounter Diagnoses   Name Primary? Vaginal irritation Yes    Postmenopausal bleeding        PLAN:  All questions answered  NuSwab vaginitis plus with 6 species yeast  Mycolog to pharmacy   Ok to take second dose of diflucan tonight  Bleeding incidentally noted on exam today (states she's had this issue on and off for a couple of years, previous imaging with normal stripe)- would like for her to return for US with visit to recheck stability of stripe. May consider EMB at MD discretion.   Will call pt with swab results and manage accordingly       Orders Placed This Encounter   Procedures    Nuswab Vaginitis with Candida (Six Species)     Standing Status:   Future     Number of Occurrences:  1     Standing Expiration Date:   1/18/2024           Supervising physician is Dr. Edward.  Greater than 50% of the 30 minute visit were spent in counseling to the above topics.

## 2023-01-22 LAB
A VAGINAE DNA VAG QL NAA+PROBE: NORMAL SCORE
BACTERIAL VAGINOSIS, NAA: NORMAL
BVAB2 DNA VAG QL NAA+PROBE: NORMAL SCORE
C ALBICANS DNA VAG QL NAA+PROBE: NEGATIVE
C GLABRATA DNA VAG QL NAA+PROBE: NEGATIVE
CANDIDA KRUSEI: NEGATIVE
CANDIDA LUSITANIAE, NAA, 180015: NEGATIVE
CANDIDA PARAPSILOSIS/TROPICALIS: NEGATIVE
MEGA1 DNA VAG QL NAA+PROBE: NORMAL SCORE
SPECIMEN SOURCE: NORMAL
T VAGINALIS RRNA SPEC QL NAA+PROBE: NEGATIVE

## 2023-01-25 ENCOUNTER — OFFICE VISIT (OUTPATIENT)
Dept: ORTHOPEDIC SURGERY | Age: 68
End: 2023-01-25

## 2023-01-25 ENCOUNTER — OFFICE VISIT (OUTPATIENT)
Dept: OBGYN CLINIC | Age: 68
End: 2023-01-25
Payer: MEDICARE

## 2023-01-25 VITALS — HEIGHT: 63 IN | BODY MASS INDEX: 24.91 KG/M2 | WEIGHT: 140.6 LBS

## 2023-01-25 DIAGNOSIS — M79.671 RIGHT FOOT PAIN: Primary | ICD-10-CM

## 2023-01-25 DIAGNOSIS — N95.0 POSTMENOPAUSAL BLEEDING: Primary | ICD-10-CM

## 2023-01-25 DIAGNOSIS — Z86.018 HISTORY OF UTERINE FIBROID: ICD-10-CM

## 2023-01-25 DIAGNOSIS — M84.374A STRESS FRACTURE OF METATARSAL BONE OF RIGHT FOOT, INITIAL ENCOUNTER: Primary | ICD-10-CM

## 2023-01-25 PROCEDURE — 1036F TOBACCO NON-USER: CPT | Performed by: OBSTETRICS & GYNECOLOGY

## 2023-01-25 PROCEDURE — 1123F ACP DISCUSS/DSCN MKR DOCD: CPT | Performed by: OBSTETRICS & GYNECOLOGY

## 2023-01-25 PROCEDURE — G8400 PT W/DXA NO RESULTS DOC: HCPCS | Performed by: OBSTETRICS & GYNECOLOGY

## 2023-01-25 PROCEDURE — G8420 CALC BMI NORM PARAMETERS: HCPCS | Performed by: OBSTETRICS & GYNECOLOGY

## 2023-01-25 PROCEDURE — 1090F PRES/ABSN URINE INCON ASSESS: CPT | Performed by: OBSTETRICS & GYNECOLOGY

## 2023-01-25 PROCEDURE — 3017F COLORECTAL CA SCREEN DOC REV: CPT | Performed by: OBSTETRICS & GYNECOLOGY

## 2023-01-25 PROCEDURE — G8427 DOCREV CUR MEDS BY ELIG CLIN: HCPCS | Performed by: OBSTETRICS & GYNECOLOGY

## 2023-01-25 PROCEDURE — G8484 FLU IMMUNIZE NO ADMIN: HCPCS | Performed by: OBSTETRICS & GYNECOLOGY

## 2023-01-25 PROCEDURE — 99214 OFFICE O/P EST MOD 30 MIN: CPT | Performed by: OBSTETRICS & GYNECOLOGY

## 2023-01-25 PROCEDURE — 76830 TRANSVAGINAL US NON-OB: CPT | Performed by: OBSTETRICS & GYNECOLOGY

## 2023-01-25 NOTE — PROGRESS NOTES
Name: David Villagomez  YOB: 1955  Gender: female  MRN: [id number]    Summary:     Right suspected fourth metatarsal stress fracture     CC: Follow-up (Right foot recheck)       HPI: David Villagomez is a 69 y.o. female who presents with Follow-up (Right foot recheck)  . Returns back the office today with a 2-3 history of increasing pain in the right lateral midfoot. She is tried some rest for this and shoewear modifications. History was obtained by Patient     ROS/Meds/PSH/PMH/FH/SH: I personally reviewed the patients standard intake form. Below are the pertinents    Tobacco:  reports that she has never smoked. She has never used smokeless tobacco.  Diabetes: None      Physical Examination:  Exam of the right foot and ankle shows tenderness to palpation over the fourth metatarsal area. She has palpable pulses and intact sensation. There is a mild flexible mallet toe of the second toe. Imaging:   No imaging reviewed           Naheed Benoit III, MD           Assessment:   Suspected fourth metatarsal stress fracture    Treatment Plan:   4 This is a chronic illness/condition with exacerbation and progression  Treatment at this time: Time with no intervention  Studies ordered: Foot XR needed @ Next Visit    Weight-bearing status: WBAT        Return to work/work restrictions: none  No medications given    She is placed into a carbon fiber plate today on the right as a matter medical necessity. She will return in 6 weeks for an x-ray.

## 2023-01-25 NOTE — PROGRESS NOTES
Patient was fitted and instructed on a Carbon Fiber Insert and Silicone Toe Sleeve for the right foot.

## 2023-02-01 ENCOUNTER — OFFICE VISIT (OUTPATIENT)
Dept: OBGYN CLINIC | Age: 68
End: 2023-02-01

## 2023-02-01 VITALS — HEIGHT: 63 IN | WEIGHT: 142 LBS | BODY MASS INDEX: 25.16 KG/M2

## 2023-02-01 DIAGNOSIS — N95.0 POSTMENOPAUSAL BLEEDING: Primary | ICD-10-CM

## 2023-02-01 DIAGNOSIS — Z86.018 HISTORY OF UTERINE FIBROID: ICD-10-CM

## 2023-02-01 NOTE — PROGRESS NOTES
Patient presents today for possible endometrial biopsy secondary to PMB. She's has been seen numerous times for this issue. She was last evaluated for PMB on 05/02/2022'; suggested to her that adjusting the dose of her hormone replacement therapy.    Gyn ultrasound performed secondary to postmenopausal bleeding.   Uterus volume= 135.08 mL.   Cervix several simple nabothian cysts, sliver of fluid in canal, hypoechoic mass with blood flow, possible fibroid VS polyp measuring 0.4 x 0.4 x 0.4 cm.   Uterus is anteverted and very heterogeneous, c/w focal and diffuse adenomyosis, several fibroids noted, largest 3 measured   F1: Mid slightly LT post abutting endo 3.46 x 2.11 x 3.10 cm   F2: Lt Ant distorting endo 1.78 x 1.67 x 1.87 cm   F3: Rt Lat Mid intramural 0.97 x 0.86 x 0.87 cm   Endo= 3.3 mm, sliver of fluid distending endo, when fully extended it measures 9.6 mm, heterogenous, view obscured due to fibroids, intracavitary masses visualized with blood flow, hypoechoic mass Lt fundal possible fibroids vs other measuring 0.8 x 0.6 x 0.8 cm, hyperechoic mass ramirez measuring 0.7 x 0.5 x 0.6 cm  Right ovary not visualized due to bowel gas.   Left ovary not visualized due to bowel gas.   Bilateral adnexa appear within normal limits.

## 2023-02-01 NOTE — PROGRESS NOTES
ENDOMETRIAL BIOPSY    Date:  1955    Name:  Maddie Giron     :  1955    Age:  69 y.o. Reason for procedure:  PMB with slightly thickened endometrium        Speculum was inserted. Cervix visualized and cleansed with betadine. Single-tooth tenaculum  was not  applied to anterior lip. Curette was inserted through OS. Uterus sounded to 9 cm. Tissue obtained was small in amount. Patient tolerated procedure well.         Henry Peres MD

## 2023-02-15 ENCOUNTER — TELEPHONE (OUTPATIENT)
Dept: OBGYN CLINIC | Age: 68
End: 2023-02-15

## 2023-02-15 NOTE — TELEPHONE ENCOUNTER
Pt called stating she woke up this morning w/ sharp cramping in her pelvis followed by passage of large (golf ball sized) blood clot. Pt states she is concerned and unsure if she needs to change hormone dose or if needs appt.     Currently on Premarin tablet (1.25mg), Provera 5mg daily and Premarin vaginal cream.     Recently had EMB d/t PMB on 2/1/23.     DIAGNOSIS        A:  \" ENDOMETRIAL BIOPSY\":         FRAGMENTS OF NONSECRETORY ENDOMETRIUM, LIMITED MATERIAL     Microscopic Description   Microscopic examination reveals a relatively even distribution of   endometrial type glands and stroma. Glands have no significant crowding,   complex branching patterns or nuclear atypism. Dr. Gopi Dorado concurs

## 2023-02-16 ENCOUNTER — TELEPHONE (OUTPATIENT)
Dept: OBGYN CLINIC | Age: 68
End: 2023-02-16

## 2023-02-17 NOTE — TELEPHONE ENCOUNTER
Attempted to call pt to discuss plan but had to LVM - pt identified herself on VM so left detailed message w/ 's instructions. Encouraged to call back w/ questions and to get scheduled for the ultrasound.

## 2023-05-01 ENCOUNTER — OFFICE VISIT (OUTPATIENT)
Dept: ORTHOPEDIC SURGERY | Age: 68
End: 2023-05-01

## 2023-05-01 DIAGNOSIS — M20.22 HALLUX RIGIDUS OF BOTH FEET: ICD-10-CM

## 2023-05-01 DIAGNOSIS — M20.21 HALLUX RIGIDUS OF BOTH FEET: ICD-10-CM

## 2023-05-01 DIAGNOSIS — M84.374A STRESS FRACTURE OF METATARSAL BONE OF RIGHT FOOT, INITIAL ENCOUNTER: Primary | ICD-10-CM

## 2023-05-01 NOTE — PROGRESS NOTES
Name: Yaron Brown  YOB: 1955  Gender: female  MRN: [id number]    Summary:   Bilateral hallux rigidus       CC: Foot Pain (Right foot pain will xray today )       HPI: Yaron Brown is a 69 y.o. female who presents with Foot Pain (Right foot pain will xray today )  . Patient presents back to the office today with increasing complaints of bilateral great toe pain worse on the right than on the left. This is been going on for about 10 years now. History was obtained by Patient     ROS/Meds/PSH/PMH/FH/SH: I personally reviewed the patients standard intake form. Below are the pertinents    Tobacco:  reports that she has never smoked. She has never used smokeless tobacco.  Diabetes: None      Physical Examination:    Exam of the bilateral feet shows limited range of motion the first MTP joints with pain throughout the arc of motion. She has palpable pulses and intact sensation. Imaging:   I independently interpreted XR taken today  Right foot XR: AP, Lateral, Oblique views     ICD-10-CM    1. Stress fracture of metatarsal bone of right foot, initial encounter  M84.374A XR FOOT RIGHT (MIN 3 VIEWS)      2. Hallux rigidus of both feet  M20.21     M20.22          Report: AP, lateral, oblique x-ray of the right foot demonstrates Kevin rigidus    Impression: Hallux rigidus bilateral hallux rigidus   Bernarda David III, MD           Assessment:   Bilateral hallux rigidus    Treatment Plan:   4 This is a chronic illness/condition with exacerbation and progression  Treatment at this time: Minor Procedure: Injection done today: The patient understands the risks and complications associated with injection. After sterile prep of the area, the Bilateral big toe MTP joint was injected with 3 cc. of Xylocaine and 3 cc. of steroid. . 40mg Depo Medrol was the steroid used. Patient tolerated it well. I discussed the risk of infection and skin blanching.   I told the be patient be careful about the symptoms of

## 2023-05-24 ENCOUNTER — TELEPHONE (OUTPATIENT)
Dept: ORTHOPEDIC SURGERY | Age: 68
End: 2023-05-24

## 2023-05-25 NOTE — TELEPHONE ENCOUNTER
Patient states she is having wrist surgery in June and will call us when she is ready to schedule surgery on her right foot.

## 2023-05-31 ENCOUNTER — TELEPHONE (OUTPATIENT)
Dept: ORTHOPEDIC SURGERY | Age: 68
End: 2023-05-31

## 2023-05-31 DIAGNOSIS — M84.374A STRESS FRACTURE OF METATARSAL BONE OF RIGHT FOOT, INITIAL ENCOUNTER: Primary | ICD-10-CM

## 2023-05-31 NOTE — TELEPHONE ENCOUNTER
Please try to call pt  on her house #   She  wants to ask to come get a boot.   She  cant  hardly  walk on her  foot  and she is close to having  CTS  surgery

## 2023-06-01 ENCOUNTER — OFFICE VISIT (OUTPATIENT)
Dept: ORTHOPEDIC SURGERY | Age: 68
End: 2023-06-01

## 2023-06-01 ENCOUNTER — TELEPHONE (OUTPATIENT)
Dept: ORTHOPEDIC SURGERY | Age: 68
End: 2023-06-01

## 2023-06-01 DIAGNOSIS — M84.374A STRESS FRACTURE OF METATARSAL BONE, RIGHT, INITIAL ENCOUNTER: Primary | ICD-10-CM

## 2023-06-01 NOTE — PROGRESS NOTES
Patient was fitted and instructed on a Post Op Shoe for the right foot. Patient read and signed documenting they understand and agree to [facility]'s current DME return policy.

## 2023-06-01 NOTE — TELEPHONE ENCOUNTER
Pt had surgery with [facility] about 1 yr ago,   She was working on ceiling fan and had her arm raised strait up and suddenly both knees  [place] weak and she had to sit down   please  Call her

## 2023-06-01 NOTE — TELEPHONE ENCOUNTER
Spoke with pt she is requesting post op shoe instead of boot due to some increased pain. States her shoes have been hurting her foot.  I will add order and she will come into [facility] office today at 1:00 for fitting with DME

## 2023-06-01 NOTE — TELEPHONE ENCOUNTER
Discussed with Dr Kathy Lambert and he thinks she should address with either her primary care or her pain management physician

## 2023-06-06 ENCOUNTER — HOSPITAL ENCOUNTER (OUTPATIENT)
Dept: GENERAL RADIOLOGY | Age: 68
Discharge: HOME | End: 2023-06-09
Payer: MEDICARE

## 2023-06-06 DIAGNOSIS — M25.571 ARTHRALGIA OF RIGHT FOOT: ICD-10-CM

## 2023-06-06 PROCEDURE — 73630 X-RAY EXAM OF FOOT: CPT

## 2023-06-17 ENCOUNTER — HOSPITAL ENCOUNTER (OUTPATIENT)
Dept: MRI IMAGING | Age: 68
Discharge: HOME | End: 2023-06-20
Attending: ANESTHESIOLOGY
Payer: MEDICARE

## 2023-06-17 DIAGNOSIS — M79.671 RIGHT FOOT PAIN: ICD-10-CM

## 2023-06-17 PROCEDURE — 73718 MRI LOWER EXTREMITY W/O DYE: CPT

## 2023-06-21 ENCOUNTER — TELEPHONE (OUTPATIENT)
Dept: ORTHOPEDIC SURGERY | Age: 68
End: 2023-06-21

## 2023-06-23 ENCOUNTER — HOSPITAL ENCOUNTER (OUTPATIENT)
Age: 68
End: 2023-06-23
Payer: MEDICARE

## 2023-06-23 DIAGNOSIS — M54.12 CERVICAL RADICULOPATHY: ICD-10-CM

## 2023-06-23 PROCEDURE — 72141 MRI NECK SPINE W/O DYE: CPT

## 2023-07-03 ENCOUNTER — TRANSCRIBE ORDERS (OUTPATIENT)
Dept: SCHEDULING | Age: 68
End: 2023-07-03

## 2023-07-03 ENCOUNTER — OFFICE VISIT (OUTPATIENT)
Dept: ORTHOPEDIC SURGERY | Age: 68
End: 2023-07-03
Payer: MEDICARE

## 2023-07-03 DIAGNOSIS — M54.16 LUMBAR RADICULOPATHY: Primary | ICD-10-CM

## 2023-07-03 DIAGNOSIS — M20.21 HALLUX RIGIDUS OF RIGHT FOOT: Primary | ICD-10-CM

## 2023-07-03 PROCEDURE — G8417 CALC BMI ABV UP PARAM F/U: HCPCS | Performed by: ORTHOPAEDIC SURGERY

## 2023-07-03 PROCEDURE — 99214 OFFICE O/P EST MOD 30 MIN: CPT | Performed by: ORTHOPAEDIC SURGERY

## 2023-07-03 PROCEDURE — 1090F PRES/ABSN URINE INCON ASSESS: CPT | Performed by: ORTHOPAEDIC SURGERY

## 2023-07-03 PROCEDURE — 1123F ACP DISCUSS/DSCN MKR DOCD: CPT | Performed by: ORTHOPAEDIC SURGERY

## 2023-07-03 PROCEDURE — G8427 DOCREV CUR MEDS BY ELIG CLIN: HCPCS | Performed by: ORTHOPAEDIC SURGERY

## 2023-07-03 PROCEDURE — G8400 PT W/DXA NO RESULTS DOC: HCPCS | Performed by: ORTHOPAEDIC SURGERY

## 2023-07-03 PROCEDURE — 1036F TOBACCO NON-USER: CPT | Performed by: ORTHOPAEDIC SURGERY

## 2023-07-03 PROCEDURE — 3017F COLORECTAL CA SCREEN DOC REV: CPT | Performed by: ORTHOPAEDIC SURGERY

## 2023-07-03 NOTE — PROGRESS NOTES
Patient was prescribed a Carbon fiber insert and against medical advice the patient declined to receive/purchase the DME. Patient understands they may take their prescription elsewhere if desired. The patient declined the carbon fiber because she received one from [facility] in January.

## 2023-07-03 NOTE — PROGRESS NOTES
Name: Phylicia Ferrell  YOB: 1955  Gender: female  MRN: [id number]    Summary:     Right hallux rigidus and second MTP Freiberg's infraction     CC: Foot Pain (Right foot pain Patient states she had an MRI right foot on 06-17-23 at [facility])       HPI: Phylicia Ferrell is a 69 y.o. female who presents with Foot Pain (Right foot pain Patient states she had an MRI right foot on 06-17-23 at [facility])  . This patient presents to the office today with continued right foot pain. She is also had an MRI done by her pain management doctor and is wants to discuss the results of this. She put herself in a walker boot and then she tripped and fell. History was obtained by Patient     ROS/Meds/PSH/PMH/FH/SH: I personally reviewed the patients standard intake form. Below are the pertinents    Tobacco:  reports that she has never smoked. She has never used smokeless tobacco.  Diabetes: None      Physical Examination:  Exam of the right foot shows pretty much an ankylosed first MTP joint. She has tenderness to palpation and swelling at the second MTP joint. She has palpable pulses. Imaging:   I independently interpreted an MRI ordered by a different physician, taken from an outside facility of the the right foot which shows significant big toe arthritis as well as second MTP Freiberg's infraction           Justyna Morillo III, MD           Assessment:   Right hallux rigidus and second MTP Freiberg's infraction    Treatment Plan:   4 This is a chronic illness/condition with exacerbation and progression  Treatment at this time: Time with no intervention  Studies ordered: NO XR needed @ Next Visit    Weight-bearing status: WBAT        Return to work/work restrictions: none  No medications given    She is placed no carbon fiber plate today as a matter medical necessity to treat the Freiberg's infraction. I think will be much safer for her from a gait standpoint.   We did discuss the first MTP

## 2023-07-05 ENCOUNTER — HOSPITAL ENCOUNTER (OUTPATIENT)
Age: 68
Discharge: HOME | End: 2023-07-07
Payer: MEDICARE

## 2023-07-05 DIAGNOSIS — M54.16 LUMBAR RADICULOPATHY: ICD-10-CM

## 2023-07-05 PROCEDURE — 6360000004 HC RX CONTRAST MEDICATION: Performed by: ANESTHESIOLOGY

## 2023-07-05 PROCEDURE — A9579 GAD-BASE MR CONTRAST NOS,1ML: HCPCS | Performed by: ANESTHESIOLOGY

## 2023-07-05 PROCEDURE — 72158 MRI LUMBAR SPINE W/O & W/DYE: CPT

## 2023-07-05 RX ADMIN — GADOTERIDOL 12 ML: 279.3 INJECTION, SOLUTION INTRAVENOUS at 15:35

## 2023-07-18 ENCOUNTER — OFFICE VISIT (OUTPATIENT)
Dept: ORTHOPEDIC SURGERY | Age: 68
End: 2023-07-18
Payer: MEDICARE

## 2023-07-18 DIAGNOSIS — M48.07 SPINAL STENOSIS OF LUMBOSACRAL REGION: Primary | ICD-10-CM

## 2023-07-18 PROCEDURE — 1036F TOBACCO NON-USER: CPT | Performed by: ORTHOPAEDIC SURGERY

## 2023-07-18 PROCEDURE — 1090F PRES/ABSN URINE INCON ASSESS: CPT | Performed by: ORTHOPAEDIC SURGERY

## 2023-07-18 PROCEDURE — G8400 PT W/DXA NO RESULTS DOC: HCPCS | Performed by: ORTHOPAEDIC SURGERY

## 2023-07-18 PROCEDURE — G8428 CUR MEDS NOT DOCUMENT: HCPCS | Performed by: ORTHOPAEDIC SURGERY

## 2023-07-18 PROCEDURE — 3017F COLORECTAL CA SCREEN DOC REV: CPT | Performed by: ORTHOPAEDIC SURGERY

## 2023-07-18 PROCEDURE — 99213 OFFICE O/P EST LOW 20 MIN: CPT | Performed by: ORTHOPAEDIC SURGERY

## 2023-07-18 PROCEDURE — 1123F ACP DISCUSS/DSCN MKR DOCD: CPT | Performed by: ORTHOPAEDIC SURGERY

## 2023-07-18 PROCEDURE — G8420 CALC BMI NORM PARAMETERS: HCPCS | Performed by: ORTHOPAEDIC SURGERY

## 2023-07-18 NOTE — PROGRESS NOTES
Name: Kierra Del Castillo  YOB: 1955  Gender: female  MRN: [id number]    DATE: 7/18/2023    CC: Follow-up and Lower Back Pain       HPI:  This is a recheck on patient Kierra Del Castillo a 68-year-old female who is a long-term patient of mine a year and a half ago I extended her prior L3-5 fusion up to L2 with a D LIF at L2-3 as well as placing cortical screws and an anterior plate had chronic pain even before the surgery as she had been on gabapentin they switched her to Lyrica to but since have seen her last she was having a lot of buttocks pain and they tried SI joint injections which did not help at all and she is got right foot problems so she is seeing Dr. Sujey Ty and apparently needs some type of surgery. Seeing Dr. Italia Saenz at pain management. [facility] for cervical and lumbar MRI scans. Apparently she scheduled to come back to see Dr. Italia Saenz in another week or so for an injection. RADIOGRAPHS: I looked at her lumbar MRI scan from 7/5/2023 and she is got L2-5 cortical screw instrumentation with a TLIF cage at L2-3 and an and a lateral plate. She has a notable disc bulge at L5-S1 this more right paracentral and the disc is very degenerated does seem to be some left-sided L5 foraminal narrowing the rest of her spine was really pretty wide open had a cervical MRI scan which quickly as reviewed if she has some stenosis down at C6-7 below her cervical fusion but it is not severe enough to be causing lower extremity problems. PE: She is got good strength in lower extremities she has a lot of tenderness over the right foot MTP joint is where she says she has a spur and needs surgery. Range of motion is good without pain and straight leg raising test is negative bilaterally.     CURRENT MEDS:     Current Outpatient Medications:     vitamin E 1000 units capsule, Take 1 capsule by mouth daily, Disp: , Rfl:     nystatin-triamcinolone (MYCOLOG II) 665983-2.1 UNIT/GM-% cream, Apply

## 2023-07-26 ENCOUNTER — TELEPHONE (OUTPATIENT)
Dept: ORTHOPEDIC SURGERY | Age: 68
End: 2023-07-26

## 2023-07-26 NOTE — TELEPHONE ENCOUNTER
Patient called and wants to know when Dr. Niyah Alberto next available surgery is being scheduled? She asks for a call back to let her know. Erick you.

## 2023-07-28 ENCOUNTER — TELEPHONE (OUTPATIENT)
Dept: ORTHOPEDIC SURGERY | Age: 68
End: 2023-07-28

## 2023-07-28 NOTE — TELEPHONE ENCOUNTER
Patient called asking if her surgery will be outpatient and I told her yes. She will call me back when she is ready to schedule.

## 2023-07-31 ENCOUNTER — TELEPHONE (OUTPATIENT)
Dept: ORTHOPEDIC SURGERY | Age: 68
End: 2023-07-31

## 2023-08-01 ENCOUNTER — OFFICE VISIT (OUTPATIENT)
Dept: ORTHOPEDIC SURGERY | Age: 68
End: 2023-08-01
Payer: MEDICARE

## 2023-08-01 DIAGNOSIS — M20.21 HALLUX RIGIDUS OF RIGHT FOOT: Primary | ICD-10-CM

## 2023-08-01 DIAGNOSIS — G89.29 CHRONIC LOW BACK PAIN, UNSPECIFIED BACK PAIN LATERALITY, UNSPECIFIED WHETHER SCIATICA PRESENT: Primary | ICD-10-CM

## 2023-08-01 DIAGNOSIS — M54.50 CHRONIC LOW BACK PAIN, UNSPECIFIED BACK PAIN LATERALITY, UNSPECIFIED WHETHER SCIATICA PRESENT: Primary | ICD-10-CM

## 2023-08-01 PROCEDURE — 1123F ACP DISCUSS/DSCN MKR DOCD: CPT | Performed by: ORTHOPAEDIC SURGERY

## 2023-08-01 PROCEDURE — G8420 CALC BMI NORM PARAMETERS: HCPCS | Performed by: ORTHOPAEDIC SURGERY

## 2023-08-01 PROCEDURE — 99213 OFFICE O/P EST LOW 20 MIN: CPT | Performed by: ORTHOPAEDIC SURGERY

## 2023-08-01 PROCEDURE — G8400 PT W/DXA NO RESULTS DOC: HCPCS | Performed by: ORTHOPAEDIC SURGERY

## 2023-08-01 PROCEDURE — 3017F COLORECTAL CA SCREEN DOC REV: CPT | Performed by: ORTHOPAEDIC SURGERY

## 2023-08-01 PROCEDURE — 1036F TOBACCO NON-USER: CPT | Performed by: ORTHOPAEDIC SURGERY

## 2023-08-01 PROCEDURE — G8428 CUR MEDS NOT DOCUMENT: HCPCS | Performed by: ORTHOPAEDIC SURGERY

## 2023-08-01 PROCEDURE — 1090F PRES/ABSN URINE INCON ASSESS: CPT | Performed by: ORTHOPAEDIC SURGERY

## 2023-08-01 NOTE — TELEPHONE ENCOUNTER
Called pt and set up surgery date for 8/17/23, told her we will mail out all the important dates regarding surgery.

## 2023-08-01 NOTE — PROGRESS NOTES
conjugated, (PREMARIN) 1.25 MG tablet, TAKE 1 TABLET BY MOUTH DAILY, Disp: , Rfl:     HYDROcodone-acetaminophen (NORCO)  MG per tablet, Take 1 tablet by mouth 4 times daily as needed. , Disp: , Rfl:     hydrOXYzine (VISTARIL) 100 MG capsule, Take 1 capsule by mouth 3 times daily as needed, Disp: , Rfl:     LORazepam (ATIVAN) 1 MG tablet, TAKE ONE TABLET BY MOUTH TWICE A DAY AS NEEDED FOR ANXIETY, Disp: , Rfl:     pantoprazole (PROTONIX) 40 MG tablet, TAKE ONE TABLET BY MOUTH ONE TIME DAILY, Disp: , Rfl:     pregabalin (LYRICA) 75 MG capsule, Take 1 capsule by mouth 3 times daily. , Disp: , Rfl:     Simethicone 125 MG CAPS, Take 1 capsule by mouth 4 times daily as needed, Disp: , Rfl:     valsartan (DIOVAN) 160 MG tablet, TAKE ONE TABLET BY MOUTH ONE TIME DAILY, Disp: , Rfl:     zolpidem (AMBIEN) 10 MG tablet, TAKE ONE TABLET BY MOUTH AT BEDTIME, Disp: , Rfl:    Allergies   Allergen Reactions    Latex Itching    Celecoxib Other (See Comments)    Mirtazapine      Drowsiness-Intolerance         ASSESSMENT/PLAN: I told the patient again and showed her MRI scan that she has a bulging disc at L5-S1 this is the only thing I see in I had like to know if this is causing her pain so when she goes back to pain management I would like for them to do an L5-S1 epidural block so we can see if this makes the patient's symptoms any better. I should probably see her back sometime after she has had the injection. Back in the reaction in the record she is already had SI joint injections which did not help at all. She does tell me that she started to have symptoms that occasionally and she cannot predict it when she standing in a line she will count feel like both legs are going to give out on her and she have to go sit down for a minute or so before she can stand back up in the line. Diagnosis Orders   1.  Chronic low back pain, unspecified back pain laterality, unspecified whether sciatica present              Henrry Manrique was

## 2023-08-09 ENCOUNTER — TELEPHONE (OUTPATIENT)
Dept: ORTHOPEDIC SURGERY | Age: 68
End: 2023-08-09

## 2023-08-09 NOTE — TELEPHONE ENCOUNTER
Patient requests a call from Alessandra Peters because she has questions about her upcoming surgery.

## 2023-08-14 ENCOUNTER — TELEPHONE (OUTPATIENT)
Dept: ORTHOPEDIC SURGERY | Age: 68
End: 2023-08-14

## 2023-08-14 NOTE — TELEPHONE ENCOUNTER
She got a call from someone about her upcoming surgery. She is asking to speak to Domo. She says the message got cut off and she don't know who the message was from.

## 2023-08-15 ENCOUNTER — TELEPHONE (OUTPATIENT)
Dept: ORTHOPEDIC SURGERY | Age: 68
End: 2023-08-15

## 2023-08-15 ENCOUNTER — HOSPITAL ENCOUNTER (OUTPATIENT)
Dept: GENERAL RADIOLOGY | Age: 68
Discharge: HOME | End: 2023-08-18
Payer: MEDICARE

## 2023-08-15 DIAGNOSIS — M25.552 LEFT HIP PAIN: ICD-10-CM

## 2023-08-15 DIAGNOSIS — M25.551 RIGHT HIP PAIN: ICD-10-CM

## 2023-08-15 PROCEDURE — 73523 X-RAY EXAM HIPS BI 5/> VIEWS: CPT

## 2023-08-15 NOTE — TELEPHONE ENCOUNTER
I called patient and apologized to her and told her that I did not mean to be rude. She said that she was upset that she was put in a room and left there. No one came and checked on her or told her she was not forgotten. She says she accepts my apology.

## 2023-08-16 ENCOUNTER — ANESTHESIA EVENT (OUTPATIENT)
Dept: SURGERY | Age: 68
End: 2023-08-16
Payer: MEDICARE

## 2023-08-16 NOTE — PROGRESS NOTES
Patient verified name and . Order for consent not found in EHR and matches case posting; patient verifies procedure. Type 1B surgery, phone assessment complete. Orders not received. Labs per surgeon: none  Labs per anesthesia protocol: none    Patient answered medical/surgical history questions at their best of ability. All prior to admission medications documented in EPIC. Patient instructed to take the following medications the day of surgery according to anesthesia guidelines with a small sip of water: Amlodipine, Wellbutrin,  Buspar, Provera, pantoprazole, Lyrica, On the day before surgery please take Tylenol (Acetaminophen) 1000mg in the morning and then again before bed. You may substitute for Tylenol 650 mg. Hold all vitamins 7 days prior to surgery and NSAIDS 5 days prior to surgery. Prescription meds to hold:Celebrex  Patient instructed on the following:    > Arrive at [facility], time of arrival to be called the day before by 1700  > NPO after midnight, unless otherwise indicated, including gum, mints, and ice chips  > Responsible adult must drive patient to the hospital, stay during surgery, and patient will need supervision 24 hours after anesthesia  > Use Dial in shower the night before surgery and on the morning of surgery  > All piercings must be removed prior to arrival.    > Leave all valuables (money and jewelry) at home but bring insurance card and ID on DOS.   > You may be required to pay a deductible or co-pay on the day of your procedure. You can pre-pay by calling [phone] if your surgery is at the [facility] or [phone] if your surgery is at the [facility]. > Do not wear make-up, nail polish, lotions, cologne, perfumes, powders, or oil on skin. Artificial nails are not permitted.

## 2023-08-16 NOTE — PERIOP NOTE
Preop department called to notify patient of arrival time for scheduled procedure. Instructions given to   - Arrive at [address] [facility]. - Remain NPO after midnight, unless otherwise indicated, including gum, mints, and ice chips. - Have a responsible adult to drive patient to the hospital, stay during surgery, and patient will need supervision 24 hours after anesthesia. - Use antibacterial soap in shower the night before surgery and on the morning of surgery.        Was patient contacted: pt  Voicemail left:   Numbers contacted: [phone]   Arrival time: 0600

## 2023-08-17 ENCOUNTER — TELEPHONE (OUTPATIENT)
Dept: ORTHOPEDIC SURGERY | Age: 68
End: 2023-08-17

## 2023-08-17 ENCOUNTER — ANESTHESIA (OUTPATIENT)
Dept: SURGERY | Age: 68
End: 2023-08-17
Payer: MEDICARE

## 2023-08-17 ENCOUNTER — APPOINTMENT (OUTPATIENT)
Dept: GENERAL RADIOLOGY | Age: 68
End: 2023-08-17
Attending: ORTHOPAEDIC SURGERY
Payer: MEDICARE

## 2023-08-17 ENCOUNTER — HOSPITAL ENCOUNTER (OUTPATIENT)
Age: 68
Setting detail: OUTPATIENT SURGERY
Discharge: HOME | End: 2023-08-17
Attending: ORTHOPAEDIC SURGERY | Admitting: ORTHOPAEDIC SURGERY
Payer: MEDICARE

## 2023-08-17 VITALS
DIASTOLIC BLOOD PRESSURE: 61 MMHG | SYSTOLIC BLOOD PRESSURE: 125 MMHG | HEIGHT: 63 IN | TEMPERATURE: 97.8 F | RESPIRATION RATE: 28 BRPM | BODY MASS INDEX: 24.45 KG/M2 | HEART RATE: 80 BPM | WEIGHT: 138 LBS | OXYGEN SATURATION: 98 %

## 2023-08-17 DIAGNOSIS — M20.21 HALLUX RIGIDUS OF RIGHT FOOT: ICD-10-CM

## 2023-08-17 PROCEDURE — 6370000000 HC RX 637 (ALT 250 FOR IP): Performed by: ANESTHESIOLOGY

## 2023-08-17 PROCEDURE — 3700000001 HC ADD 15 MINUTES (ANESTHESIA): Performed by: ORTHOPAEDIC SURGERY

## 2023-08-17 PROCEDURE — 76942 ECHO GUIDE FOR BIOPSY: CPT | Performed by: ANESTHESIOLOGY

## 2023-08-17 PROCEDURE — C1713 ANCHOR/SCREW BN/BN,TIS/BN: HCPCS | Performed by: ORTHOPAEDIC SURGERY

## 2023-08-17 PROCEDURE — 2709999900 HC NON-CHARGEABLE SUPPLY: Performed by: ORTHOPAEDIC SURGERY

## 2023-08-17 PROCEDURE — 3600000014 HC SURGERY LEVEL 4 ADDTL 15MIN: Performed by: ORTHOPAEDIC SURGERY

## 2023-08-17 PROCEDURE — 7100000010 HC PHASE II RECOVERY - FIRST 15 MIN: Performed by: ORTHOPAEDIC SURGERY

## 2023-08-17 PROCEDURE — 7100000001 HC PACU RECOVERY - ADDTL 15 MIN: Performed by: ORTHOPAEDIC SURGERY

## 2023-08-17 PROCEDURE — 6360000002 HC RX W HCPCS: Performed by: ANESTHESIOLOGY

## 2023-08-17 PROCEDURE — 20900 REMOVAL OF BONE FOR GRAFT: CPT | Performed by: ORTHOPAEDIC SURGERY

## 2023-08-17 PROCEDURE — 7100000000 HC PACU RECOVERY - FIRST 15 MIN: Performed by: ORTHOPAEDIC SURGERY

## 2023-08-17 PROCEDURE — 28750 FUSION OF BIG TOE JOINT: CPT | Performed by: ORTHOPAEDIC SURGERY

## 2023-08-17 PROCEDURE — 6360000002 HC RX W HCPCS: Performed by: NURSE PRACTITIONER

## 2023-08-17 PROCEDURE — 2580000003 HC RX 258: Performed by: ANESTHESIOLOGY

## 2023-08-17 PROCEDURE — 3600000004 HC SURGERY LEVEL 4 BASE: Performed by: ORTHOPAEDIC SURGERY

## 2023-08-17 PROCEDURE — 3700000000 HC ANESTHESIA ATTENDED CARE: Performed by: ORTHOPAEDIC SURGERY

## 2023-08-17 PROCEDURE — 2720000010 HC SURG SUPPLY STERILE: Performed by: ORTHOPAEDIC SURGERY

## 2023-08-17 PROCEDURE — 6360000002 HC RX W HCPCS: Performed by: REGISTERED NURSE

## 2023-08-17 PROCEDURE — 2500000003 HC RX 250 WO HCPCS: Performed by: REGISTERED NURSE

## 2023-08-17 PROCEDURE — 2580000003 HC RX 258: Performed by: NURSE PRACTITIONER

## 2023-08-17 DEVICE — PLATE SCREW
Type: IMPLANTABLE DEVICE | Site: FOOT | Status: FUNCTIONAL
Brand: ORTHOLOC 3DI

## 2023-08-17 DEVICE — PLATING
Type: IMPLANTABLE DEVICE | Site: FOOT | Status: FUNCTIONAL
Brand: ORTHOLOC 3DI

## 2023-08-17 DEVICE — FUSION PLATE
Type: IMPLANTABLE DEVICE | Site: FOOT | Status: FUNCTIONAL
Brand: ORTHOLOC 3DI

## 2023-08-17 DEVICE — CANNULATED SCREW
Type: IMPLANTABLE DEVICE | Site: FOOT | Status: FUNCTIONAL
Brand: DARTFIRE EDGE

## 2023-08-17 RX ORDER — HYDROMORPHONE HYDROCHLORIDE 2 MG/ML
0.5 INJECTION, SOLUTION INTRAMUSCULAR; INTRAVENOUS; SUBCUTANEOUS EVERY 10 MIN PRN
Status: COMPLETED | OUTPATIENT
Start: 2023-08-17 | End: 2023-08-17

## 2023-08-17 RX ORDER — IPRATROPIUM BROMIDE AND ALBUTEROL SULFATE 2.5; .5 MG/3ML; MG/3ML
1 SOLUTION RESPIRATORY (INHALATION)
Status: DISCONTINUED | OUTPATIENT
Start: 2023-08-17 | End: 2023-08-17 | Stop reason: HOSPADM

## 2023-08-17 RX ORDER — SODIUM CHLORIDE 0.9 % (FLUSH) 0.9 %
5-40 SYRINGE (ML) INJECTION PRN
Status: DISCONTINUED | OUTPATIENT
Start: 2023-08-17 | End: 2023-08-17 | Stop reason: HOSPADM

## 2023-08-17 RX ORDER — HALOPERIDOL 5 MG/ML
1 INJECTION INTRAMUSCULAR
Status: DISCONTINUED | OUTPATIENT
Start: 2023-08-17 | End: 2023-08-17 | Stop reason: HOSPADM

## 2023-08-17 RX ORDER — SODIUM CHLORIDE, SODIUM LACTATE, POTASSIUM CHLORIDE, CALCIUM CHLORIDE 600; 310; 30; 20 MG/100ML; MG/100ML; MG/100ML; MG/100ML
INJECTION, SOLUTION INTRAVENOUS CONTINUOUS
Status: DISCONTINUED | OUTPATIENT
Start: 2023-08-17 | End: 2023-08-17 | Stop reason: HOSPADM

## 2023-08-17 RX ORDER — ONDANSETRON 2 MG/ML
4 INJECTION INTRAMUSCULAR; INTRAVENOUS
Status: DISCONTINUED | OUTPATIENT
Start: 2023-08-17 | End: 2023-08-17 | Stop reason: HOSPADM

## 2023-08-17 RX ORDER — SODIUM CHLORIDE 0.9 % (FLUSH) 0.9 %
5-40 SYRINGE (ML) INJECTION EVERY 12 HOURS SCHEDULED
Status: DISCONTINUED | OUTPATIENT
Start: 2023-08-17 | End: 2023-08-17 | Stop reason: HOSPADM

## 2023-08-17 RX ORDER — FENTANYL CITRATE 50 UG/ML
50 INJECTION, SOLUTION INTRAMUSCULAR; INTRAVENOUS EVERY 5 MIN PRN
Status: DISCONTINUED | OUTPATIENT
Start: 2023-08-17 | End: 2023-08-17 | Stop reason: HOSPADM

## 2023-08-17 RX ORDER — MIDAZOLAM HYDROCHLORIDE 2 MG/2ML
2 INJECTION, SOLUTION INTRAMUSCULAR; INTRAVENOUS
Status: COMPLETED | OUTPATIENT
Start: 2023-08-17 | End: 2023-08-17

## 2023-08-17 RX ORDER — BUPIVACAINE HYDROCHLORIDE 5 MG/ML
INJECTION, SOLUTION EPIDURAL; INTRACAUDAL
Status: COMPLETED | OUTPATIENT
Start: 2023-08-17 | End: 2023-08-17

## 2023-08-17 RX ORDER — CEPHALEXIN 500 MG/1
500 CAPSULE ORAL 4 TIMES DAILY
Qty: 12 CAPSULE | Refills: 0 | Status: SHIPPED | OUTPATIENT
Start: 2023-08-17

## 2023-08-17 RX ORDER — PROPOFOL 10 MG/ML
INJECTION, EMULSION INTRAVENOUS PRN
Status: DISCONTINUED | OUTPATIENT
Start: 2023-08-17 | End: 2023-08-17 | Stop reason: SDUPTHER

## 2023-08-17 RX ORDER — LIDOCAINE HYDROCHLORIDE 10 MG/ML
1 INJECTION, SOLUTION INFILTRATION; PERINEURAL
Status: DISCONTINUED | OUTPATIENT
Start: 2023-08-17 | End: 2023-08-17 | Stop reason: HOSPADM

## 2023-08-17 RX ORDER — LIDOCAINE HYDROCHLORIDE 20 MG/ML
INJECTION, SOLUTION EPIDURAL; INFILTRATION; INTRACAUDAL; PERINEURAL PRN
Status: DISCONTINUED | OUTPATIENT
Start: 2023-08-17 | End: 2023-08-17 | Stop reason: SDUPTHER

## 2023-08-17 RX ORDER — PROPOFOL 10 MG/ML
INJECTION, EMULSION INTRAVENOUS CONTINUOUS PRN
Status: DISCONTINUED | OUTPATIENT
Start: 2023-08-17 | End: 2023-08-17 | Stop reason: SDUPTHER

## 2023-08-17 RX ORDER — SODIUM CHLORIDE 9 MG/ML
INJECTION, SOLUTION INTRAVENOUS PRN
Status: DISCONTINUED | OUTPATIENT
Start: 2023-08-17 | End: 2023-08-17 | Stop reason: HOSPADM

## 2023-08-17 RX ORDER — OXYCODONE HYDROCHLORIDE 5 MG/1
5 TABLET ORAL
Status: COMPLETED | OUTPATIENT
Start: 2023-08-17 | End: 2023-08-17

## 2023-08-17 RX ORDER — ACETAMINOPHEN 500 MG
1000 TABLET ORAL ONCE
Status: COMPLETED | OUTPATIENT
Start: 2023-08-17 | End: 2023-08-17

## 2023-08-17 RX ADMIN — HYDROMORPHONE HYDROCHLORIDE 0.5 MG: 2 INJECTION, SOLUTION INTRAMUSCULAR; INTRAVENOUS; SUBCUTANEOUS at 08:30

## 2023-08-17 RX ADMIN — HYDROMORPHONE HYDROCHLORIDE 0.5 MG: 2 INJECTION, SOLUTION INTRAMUSCULAR; INTRAVENOUS; SUBCUTANEOUS at 08:08

## 2023-08-17 RX ADMIN — OXYCODONE HYDROCHLORIDE 5 MG: 5 TABLET ORAL at 08:40

## 2023-08-17 RX ADMIN — SODIUM CHLORIDE, POTASSIUM CHLORIDE, SODIUM LACTATE AND CALCIUM CHLORIDE: 600; 310; 30; 20 INJECTION, SOLUTION INTRAVENOUS at 07:01

## 2023-08-17 RX ADMIN — LIDOCAINE HYDROCHLORIDE 40 MG: 20 INJECTION, SOLUTION EPIDURAL; INFILTRATION; INTRACAUDAL; PERINEURAL at 07:17

## 2023-08-17 RX ADMIN — MIDAZOLAM 2 MG: 1 INJECTION INTRAMUSCULAR; INTRAVENOUS at 06:50

## 2023-08-17 RX ADMIN — MEPIVACAINE HYDROCHLORIDE 10 ML: 15 INJECTION, SOLUTION EPIDURAL; INFILTRATION at 06:50

## 2023-08-17 RX ADMIN — HYDROMORPHONE HYDROCHLORIDE 0.5 MG: 2 INJECTION, SOLUTION INTRAMUSCULAR; INTRAVENOUS; SUBCUTANEOUS at 08:15

## 2023-08-17 RX ADMIN — PROPOFOL 40 MG: 10 INJECTION, EMULSION INTRAVENOUS at 07:17

## 2023-08-17 RX ADMIN — SODIUM CHLORIDE, SODIUM LACTATE, POTASSIUM CHLORIDE, AND CALCIUM CHLORIDE: 600; 310; 30; 20 INJECTION, SOLUTION INTRAVENOUS at 07:10

## 2023-08-17 RX ADMIN — HYDROMORPHONE HYDROCHLORIDE 0.5 MG: 2 INJECTION, SOLUTION INTRAMUSCULAR; INTRAVENOUS; SUBCUTANEOUS at 08:36

## 2023-08-17 RX ADMIN — BUPIVACAINE HYDROCHLORIDE 20 ML: 5 INJECTION, SOLUTION EPIDURAL; INTRACAUDAL; PERINEURAL at 07:04

## 2023-08-17 RX ADMIN — Medication 2000 MG: at 07:15

## 2023-08-17 RX ADMIN — PROPOFOL 140 MCG/KG/MIN: 10 INJECTION, EMULSION INTRAVENOUS at 07:17

## 2023-08-17 RX ADMIN — ACETAMINOPHEN 1000 MG: 500 TABLET, FILM COATED ORAL at 07:00

## 2023-08-17 ASSESSMENT — PAIN DESCRIPTION - ORIENTATION
ORIENTATION: RIGHT

## 2023-08-17 ASSESSMENT — PAIN SCALES - GENERAL
PAINLEVEL_OUTOF10: 8
PAINLEVEL_OUTOF10: 4
PAINLEVEL_OUTOF10: 6
PAINLEVEL_OUTOF10: 6
PAINLEVEL_OUTOF10: 4
PAINLEVEL_OUTOF10: 8
PAINLEVEL_OUTOF10: 7

## 2023-08-17 ASSESSMENT — PAIN DESCRIPTION - LOCATION
LOCATION: FOOT

## 2023-08-17 NOTE — TELEPHONE ENCOUNTER
She states she has tried to contact our office 3 times and has been cut off. She is very upset on the voicemail and needs to hear from someone regarding her surgery this morning.  She said she will have to get in the car and come to the office if no one calls her and that is totally unnecessary

## 2023-08-17 NOTE — PERIOP NOTE
PACU DISCHARGE NOTE    Pt and  verbalized understanding of discharge inst. Pt tolerated po fluids . Pt discharged with post op shoe on right foot. Vital signs stable, pain well controlled, alert and oriented times three or at baseline, follow up per surgeon, no anesthetic complications.

## 2023-08-17 NOTE — OP NOTE
Operative Note    Rima Shipley  MRN: [id number]    Date Of Surgery: 8/17/2023    Surgeon: Zaynab Farias MD    Assistant Surgeon: None    Pre Op Diagnosis:  Pre-Op Diagnosis Codes:     * Hallux rigidus of right foot [M20.21]      Post Op Diagnosis:   same    Procedures Performed:  Right first MTP arthrodesis, 29873  Right calcaneal autograft harvest, 15130    Implants:   Implant Name Type Inv. Item Serial No.  Lot No. LRB No. Used Action   ORTHOLOC 3Di Plating, Size 18mm    [employer] [id number] Right 1 Implanted   DARTFIRE EDGE Cannlated Screw 3.5mm x 26mm    WANDA Zooom[employer]_CR [id number] Right 1 Implanted   ORTHOLOC 3Di Fusion Plate Medium Angle: 0 DEGREE    [employer] [id number] Right 1 Implanted   ORTHOLOC 3Di Plate Screw 1.3NS x 36NK    [employer] [id number] Right 1 Implanted   ORTHOLOC 3Di Plate Screw 0.5BL x 99BR    [employer] [id number] Right 1 Implanted   ORTHOLOC 3Di Plate Screw 3.1LY x 07DL    [employer] [id number] Right 1 Implanted   ORTHOLOC 3Di Plate Screw 3.9MO x 09PG    [employer] [id number] Right 1 Implanted   ORTHOLOC 3Di Plate Screw 7.4QJ x 07LH    [employer] [id number] Right 1 Implanted   ORTHOLOC 3Di Plate Screw 2.5GM x 76QV    [employer]DA [employer]CR [id number] Right 1 Implanted       Anesthesia:  Regional    Blood Loss:  minimal    Tourniquet:  Estimated calf 30 minutes    Pre Operative Abx:   Ancef 2g            Pre Operative Course:  Reji Draper is a 77 y.o. female who has a history of severe hallux rigidus on the right. Operation In Detail:  Patient was evaluated in the preoperative area. We had a long discussion about the procedure and postoperative protocols. The patient was then brought back to the operating room suite and placed in the operating room table. A timeout was taken to identify the patient, procedure being performed, and laterality.   After this the patient was prepped and draped in the normal sterile fashion using a Betadine solution and/or a

## 2023-08-17 NOTE — ANESTHESIA PROCEDURE NOTES
Peripheral Block    Patient location during procedure: pre-op  Reason for block: post-op pain management and at surgeon's request  Start time: 8/17/2023 6:50 AM  End time: 8/17/2023 6:58 AM  Staffing  Performed: anesthesiologist   Anesthesiologist: Sandy Roa MD  Preanesthetic Checklist  Completed: patient identified, IV checked, site marked, risks and benefits discussed, surgical/procedural consents, equipment checked, pre-op evaluation, timeout performed, anesthesia consent given, oxygen available, monitors applied/VS acknowledged and blood product R/B/A discussed and consented  Peripheral Block   Patient position: supine  Prep: ChloraPrep  Provider prep: mask and sterile gloves  Patient monitoring: cardiac monitor, continuous pulse ox, frequent blood pressure checks, IV access, oxygen and responsive to questions  Block type:  Ankle  Laterality: right  Injection technique: single-shot  Guidance: ultrasound guided  Local infiltration: lidocaine  Infiltration strength: 1 %  Local infiltration: lidocaine  Dose: 3 mL    Needle   Needle type: insulated echogenic nerve stimulator needle   Needle gauge: 20 G  Needle localization: ultrasound guidance  Needle length: 10 cm  Assessment   Injection assessment: negative aspiration for heme, no paresthesia on injection, low pressure verified by pressure monitor, no intravascular symptoms and local visualized surrounding nerve on ultrasound  Paresthesia pain: none  Slow fractionated injection: yes  Hemodynamics: stable  Real-time US image taken/store: yes  Outcomes: uncomplicated    Medications Administered  bupivacaine (MARCAINE) PF injection 0.5% - Perineural   20 mL - 8/17/2023 7:04:00 AM  mepivacaine (CARBOCAINE) injection 1.5% - Perineural   10 mL - 8/17/2023 6:50:00 AM

## 2023-08-18 ENCOUNTER — TELEPHONE (OUTPATIENT)
Dept: ORTHOPEDIC SURGERY | Age: 68
End: 2023-08-18

## 2023-08-18 NOTE — TELEPHONE ENCOUNTER
Patient states she is in pain management and was given Norco 5-325. I explained to patient she can take 2 Norco every 4 hours x 24 hours then go back to 1 every 6 hours. Patient voiced understanding.

## 2023-08-30 ENCOUNTER — OFFICE VISIT (OUTPATIENT)
Dept: ORTHOPEDIC SURGERY | Age: 68
End: 2023-08-30

## 2023-08-30 DIAGNOSIS — M20.21 HALLUX RIGIDUS OF RIGHT FOOT: Primary | ICD-10-CM

## 2023-08-30 PROCEDURE — 99024 POSTOP FOLLOW-UP VISIT: CPT | Performed by: NURSE PRACTITIONER

## 2023-08-30 NOTE — PROGRESS NOTES
Name: Sheryl Benavidez  YOB: 1955  Gender: female  MRN: [id number]    Procedure Performed:  Right first MTP arthrodesis  Right calcaneal autograft harvest        Date of Procedure: 08/17/2023      Subjective: Patient seen to be doing well overall per surgery. Her pain seems to be well controlled at this point. She seems pleased with the overall appearance of her foot. Physical Examination: Incisional area to the dorsal aspect of the first MTP joint does appear to be healing well and shows no signs of infection. She has palpable pulses and intact sensation to the foot. Range of motion to the first IP and TMT joints were performed without difficulty or pain. She has no pain with palpation to the plantar aspect of the MTP joint or throughout the arch of the foot. He does have noted swelling throughout the toes and dorsal aspect of the foot. To wiggle the lesser toes without difficulty or pain. The great toe seems to be well surgically corrected at this point postoperatively. Imaging:   No imaging reviewed          Assessment:   Status post right first MTP fusion with calcaneal autograft harvest.  We discussed progression of care today as well as expectations until the next follow-up visit. Questions externum addressed, she verbalized understanding of today's conversation. Plan:   3 This is stable chronic illness/condition  Treatment at this time: Sutures removed and Steri-Strips placed. The patient may now get the affected extremity wet including showering and soaking, recommendation of Epsom salts was given to help with additional incisional healing as well as swelling purposes. .  It was encouraged that the patient continue elevating the affected extremity as needed for swelling, icing is also an option for this. The patient will continue to limit the activity levels at this time excluding high-impact.   The patient will continue to wear the postop shoe as a matter medical

## 2023-09-01 ENCOUNTER — TELEPHONE (OUTPATIENT)
Dept: ORTHOPEDIC SURGERY | Age: 68
End: 2023-09-01

## 2023-09-01 NOTE — TELEPHONE ENCOUNTER
DAMIÁN. on patient's voicemail she will continue to have pain (her pain should subside over the next month) Patient will continue to have swelling for 3-6 months or more.

## 2023-09-06 ENCOUNTER — TELEPHONE (OUTPATIENT)
Dept: ORTHOPEDIC SURGERY | Age: 68
End: 2023-09-06

## 2023-09-06 NOTE — TELEPHONE ENCOUNTER
Patient states she is still having increased pain in her foot. I explained to the patient she should still be in pain however as the weeks go by her pain should decrease. Patient voiced understanding.

## 2023-09-13 ENCOUNTER — OFFICE VISIT (OUTPATIENT)
Dept: ORTHOPEDIC SURGERY | Age: 68
End: 2023-09-13

## 2023-09-13 DIAGNOSIS — M20.21 HALLUX RIGIDUS OF RIGHT FOOT: Primary | ICD-10-CM

## 2023-09-13 PROCEDURE — 99024 POSTOP FOLLOW-UP VISIT: CPT | Performed by: ORTHOPAEDIC SURGERY

## 2023-09-13 NOTE — PROGRESS NOTES
Name: July Alvarado  YOB: 1955  Gender: female  MRN: [id number]    Procedure Performed:Right first metartarsophalangeal arthrodesis wtih calcaneal autograft - Right        Date of Procedure: 8/17/2023      Subjective: Doing well. Still having some pain. Physical Examination: Incisions well-healed without sign of infection. She has expected swelling and good alignment of the toe. Imaging:   I independently interpreted XR taken today  Right foot XR: AP, Lateral, Oblique views     ICD-10-CM    1. Hallux rigidus of right foot  M20.21 XR FOOT RIGHT (MIN 3 VIEWS)         Report: AP, lateral, oblique x-ray of the right foot demonstrates no hardware failure    Impression: No hardware failure   Wendell Hodgkins III, MD           Assessment:   Right first MTP arthrodesis      Plan:   3 This is stable chronic illness/condition  Treatment at this time: Time with no intervention and OTC NSAIDS  Studies ordered:  Foot XR needed @ Next Visit    Weight-bearing status: WBAT        Return to work/work restrictions: none  No medications given

## 2023-09-22 ENCOUNTER — TELEPHONE (OUTPATIENT)
Dept: ORTHOPEDIC SURGERY | Age: 68
End: 2023-09-22

## 2023-09-22 NOTE — TELEPHONE ENCOUNTER
She is calling to discuss her arms going numb and waking her at night. She states has to stretch out her arms and shake her hands to get it to go away. I did tell her that sounded more that carpal or cubital tunnel rather than cervical related. I told her to check with Dr Chidi Brown and see about an emg/ncs to further investigate where this is coming from. She did state she does know she has carpal but had to put following up with that due to just having foot surgery with Dr Sulma Ratliff. I let her know that if she did have this test we would be more than happy to look at the results.

## 2023-10-02 ENCOUNTER — TELEPHONE (OUTPATIENT)
Dept: ORTHOPEDIC SURGERY | Age: 68
End: 2023-10-02

## 2023-10-02 NOTE — TELEPHONE ENCOUNTER
She needs to speak to Becky. Patient told her Becky told her to double up on her pain meds and the pain mgmt office wrote these. She says they don't agree with this. Please give her a call as soon as you can.

## 2023-10-02 NOTE — TELEPHONE ENCOUNTER
Spoke to [facility] and informed her the patient's surgery was 08-17-23 and she called me on 08-18-23 informing her she can take 2 Norco every 4 hours x 24 hours then go back to 1 every 6 hours. [place] understood my directions to her but stated the pain management physician was concerned about the swelling in her foot. I told [facility] I would call the patient.

## 2023-10-03 ENCOUNTER — OFFICE VISIT (OUTPATIENT)
Dept: ORTHOPEDIC SURGERY | Age: 68
End: 2023-10-03
Payer: MEDICARE

## 2023-10-03 DIAGNOSIS — G89.29 CHRONIC LOW BACK PAIN, UNSPECIFIED BACK PAIN LATERALITY, UNSPECIFIED WHETHER SCIATICA PRESENT: Primary | ICD-10-CM

## 2023-10-03 DIAGNOSIS — M54.50 CHRONIC LOW BACK PAIN, UNSPECIFIED BACK PAIN LATERALITY, UNSPECIFIED WHETHER SCIATICA PRESENT: Primary | ICD-10-CM

## 2023-10-03 PROCEDURE — 99213 OFFICE O/P EST LOW 20 MIN: CPT | Performed by: ORTHOPAEDIC SURGERY

## 2023-10-03 PROCEDURE — 1036F TOBACCO NON-USER: CPT | Performed by: ORTHOPAEDIC SURGERY

## 2023-10-03 PROCEDURE — G8420 CALC BMI NORM PARAMETERS: HCPCS | Performed by: ORTHOPAEDIC SURGERY

## 2023-10-03 PROCEDURE — 1090F PRES/ABSN URINE INCON ASSESS: CPT | Performed by: ORTHOPAEDIC SURGERY

## 2023-10-03 PROCEDURE — 1123F ACP DISCUSS/DSCN MKR DOCD: CPT | Performed by: ORTHOPAEDIC SURGERY

## 2023-10-03 PROCEDURE — 3017F COLORECTAL CA SCREEN DOC REV: CPT | Performed by: ORTHOPAEDIC SURGERY

## 2023-10-03 PROCEDURE — G8484 FLU IMMUNIZE NO ADMIN: HCPCS | Performed by: ORTHOPAEDIC SURGERY

## 2023-10-03 PROCEDURE — G8427 DOCREV CUR MEDS BY ELIG CLIN: HCPCS | Performed by: ORTHOPAEDIC SURGERY

## 2023-10-03 PROCEDURE — G8400 PT W/DXA NO RESULTS DOC: HCPCS | Performed by: ORTHOPAEDIC SURGERY

## 2023-10-03 NOTE — PROGRESS NOTES
Name: Dot Porras  YOB: 1955  Gender: female  MRN: [id number]    DATE: 10/3/2023    CC: Follow-up       HPI:  This is a recheck on patient Dot Porras is a 76-year-old white female who is a long-term patient of mine she has had a fusion from L2 down to L5 and a TLIF interbody cage at L2-3 she has some chronic low back pain as well as some leg pain and she is on Lyrica but they had to decrease the amount because she developed swelling in the lower extremities. He also had extensive neck surgery with Dr. Hilda Owusu and she is more recently had surgery on her foot by [facility]. Post recent MRI scan did show a bulging disc at L5-S1 that could irritate the S1 nerve roots. She was seeing pain management and seeing Dr. Randy Wiggins and she says that Dr. Randy Wiggins did an SI joint injection recently that she says helped with the pain in her legs and she also did bilateral SI joint injections yesterday that she gets periodically which seem to help a lot. She also gets some neck injections which seemed to help            RADIOGRAPHS: Did review her last MRI scan of her back and she does have the disc bulging centrally bulging at L5-S1 but there is instrumentation from L2 down to L5 and I told her surgery would not be easy it would require another fusion. PE: I looked at her foot the right foot she had had surgery on and the incisions are all healing well and the swelling that she had yesterday that I could see on the phone picture is markedly improved. There is no erythema there is no concern for infection    CURRENT MEDS:     Current Outpatient Medications:     vitamin E 1000 units capsule, Take 1 capsule by mouth daily, Disp: , Rfl:     nystatin-triamcinolone (MYCOLOG II) 649711-6.1 UNIT/GM-% cream, Apply topically 2 times daily. , Disp: 30 g, Rfl: 0    estrogens conjugated (PREMARIN) 0.625 MG/GM CREA vaginal cream, Place 0.5 g vaginally daily, Disp: 30 g, Rfl: 3    amLODIPine (NORVASC) 5 MG tablet, TAKE

## 2023-11-01 ENCOUNTER — OFFICE VISIT (OUTPATIENT)
Dept: ORTHOPEDIC SURGERY | Age: 68
End: 2023-11-01

## 2023-11-01 DIAGNOSIS — M20.21 HALLUX RIGIDUS OF RIGHT FOOT: Primary | ICD-10-CM

## 2023-11-01 DIAGNOSIS — M84.374A STRESS FRACTURE OF METATARSAL BONE OF RIGHT FOOT, INITIAL ENCOUNTER: ICD-10-CM

## 2023-11-01 NOTE — PROGRESS NOTES
Name: Tomás Orantes  YOB: 1955  Gender: female  MRN: [id number]    Procedure Performed:Right first metartarsophalangeal arthrodesis wtih calcaneal autograft - Right        Date of Procedure: 8/17/2023      Subjective: Doing okay. Still having some pain in the heel      Physical Examination: Position of the toe with well-healed surgical incision        Imaging:   Interpretation of imaging  Foot XR: AP, Lateral, Oblique views     ICD-10-CM    1. Hallux rigidus of right foot  M20.21 XR FOOT RIGHT (MIN 3 VIEWS)      2. Stress fracture of metatarsal bone of right foot, initial encounter  M84.374A XR FOOT RIGHT (MIN 3 VIEWS)         Report: AP, lateral, oblique x-ray of the right foot demonstrates no hardware failure    Impression: No hardware failure   Navin Miller III, MD           Assessment:   Right big toe fusion      Plan:   4 This is a chronic illness/condition with exacerbation and progression  Treatment at this time: Time with no intervention  Studies ordered:  Foot XR needed @ Next Visit    Weight-bearing status: WBAT        Return to work/work restrictions: none  No medications given

## 2023-11-13 ENCOUNTER — TELEPHONE (OUTPATIENT)
Dept: ORTHOPEDIC SURGERY | Age: 68
End: 2023-11-13

## 2023-11-13 NOTE — TELEPHONE ENCOUNTER
Pt called and said something has happened  To her foot that she has surgery on in   August, wants to be seen

## 2023-12-04 ENCOUNTER — OFFICE VISIT (OUTPATIENT)
Dept: ORTHOPEDIC SURGERY | Age: 68
End: 2023-12-04
Payer: MEDICARE

## 2023-12-04 DIAGNOSIS — Z98.1 STATUS POST LUMBAR SPINAL ARTHRODESIS: Primary | ICD-10-CM

## 2023-12-04 PROCEDURE — G8484 FLU IMMUNIZE NO ADMIN: HCPCS | Performed by: ORTHOPAEDIC SURGERY

## 2023-12-04 PROCEDURE — G8400 PT W/DXA NO RESULTS DOC: HCPCS | Performed by: ORTHOPAEDIC SURGERY

## 2023-12-04 PROCEDURE — 99212 OFFICE O/P EST SF 10 MIN: CPT | Performed by: ORTHOPAEDIC SURGERY

## 2023-12-04 PROCEDURE — G8427 DOCREV CUR MEDS BY ELIG CLIN: HCPCS | Performed by: ORTHOPAEDIC SURGERY

## 2023-12-04 PROCEDURE — 1090F PRES/ABSN URINE INCON ASSESS: CPT | Performed by: ORTHOPAEDIC SURGERY

## 2023-12-04 PROCEDURE — G8420 CALC BMI NORM PARAMETERS: HCPCS | Performed by: ORTHOPAEDIC SURGERY

## 2023-12-04 PROCEDURE — 1123F ACP DISCUSS/DSCN MKR DOCD: CPT | Performed by: ORTHOPAEDIC SURGERY

## 2023-12-04 PROCEDURE — 3017F COLORECTAL CA SCREEN DOC REV: CPT | Performed by: ORTHOPAEDIC SURGERY

## 2023-12-04 PROCEDURE — 1036F TOBACCO NON-USER: CPT | Performed by: ORTHOPAEDIC SURGERY

## 2023-12-04 NOTE — PROGRESS NOTES
Name: Matilde Manning  YOB: 1955  Gender: female  MRN: [id number]  Age: 77 y.o. History of present illness:     Patient is well-known to our practice. She is status post L2-L5 fusion. She is also had extensive cervical surgery by me and foot surgery by Dr. Jose Juan Schrader. She has low back pain and has been under the care of Dr. Barrington Quevedo receiving periodic injections which seem to be working for a couple of months each. Physical Exam:    She is awake and oriented in no acute distress. Respirations are unlabored and there is no evidence of cyanosis. Mood and affect are appropriate. She has normal strength and sensation in both lower extremities. Straight leg raise is negative. No hip irritability. Gait is unassisted. Radiographic Studies:     I reviewed her MRI from earlier this summer which reveals postoperative changes status post lumbar laminectomy and fusion. She does have loss of disc height at L5-S1 but no significant resulting foraminal or descending root impingement. There does not appear to be any spondylolisthesis or subluxation at L5-S1. Diagnosis:      ICD-10-CM    1. Status post lumbar spinal arthrodesis  Z98.1           Assessment/Plan:      I believe she has junctional pain at the L5-S1 level. Fortunately, there is no significant neural impingement. I think her care would be best managed long-term and the interventional pain management setting with Dr. Barrington Quevedo.       Electronically Signed By Sandrita Lucio MD   12/04/23  1:50 PM

## 2023-12-13 ENCOUNTER — OFFICE VISIT (OUTPATIENT)
Dept: ORTHOPEDIC SURGERY | Age: 68
End: 2023-12-13

## 2023-12-13 DIAGNOSIS — M20.21 HALLUX RIGIDUS OF RIGHT FOOT: Primary | ICD-10-CM

## 2023-12-13 DIAGNOSIS — M25.531 RIGHT WRIST PAIN: ICD-10-CM

## 2023-12-13 NOTE — PROGRESS NOTES
Patient was fit for a Wrist/Forearm lacer for patients right elbow pain. Patient is instructed the brace should fit nicely with in the palm and right below the knuckles on the dorsal side of the hand. The patient was aware the brace should fit snuggly around the wrist/forearm area. The strap placed through the thumb and first finger should fit comfortably to insure the brace does not slide or shift. Patient read and signed documenting they understand and agree to [facility]'s current DME return policy.
advance her activities as tolerated.

## 2023-12-25 NOTE — TELEPHONE ENCOUNTER
Depression  - continue buspar and celexa   Patient called and wants to schedule surgery. Please call patient at [phone]. Thank you.

## 2024-02-15 ENCOUNTER — OFFICE VISIT (OUTPATIENT)
Dept: ORTHOPEDIC SURGERY | Age: 69
End: 2024-02-15
Payer: MEDICARE

## 2024-02-15 DIAGNOSIS — Z98.1 STATUS POST LUMBAR SPINAL ARTHRODESIS: Primary | ICD-10-CM

## 2024-02-15 DIAGNOSIS — Z98.1 ARTHRODESIS STATUS: ICD-10-CM

## 2024-02-15 PROCEDURE — 1090F PRES/ABSN URINE INCON ASSESS: CPT | Performed by: ORTHOPAEDIC SURGERY

## 2024-02-15 PROCEDURE — G8420 CALC BMI NORM PARAMETERS: HCPCS | Performed by: ORTHOPAEDIC SURGERY

## 2024-02-15 PROCEDURE — 1036F TOBACCO NON-USER: CPT | Performed by: ORTHOPAEDIC SURGERY

## 2024-02-15 PROCEDURE — G8484 FLU IMMUNIZE NO ADMIN: HCPCS | Performed by: ORTHOPAEDIC SURGERY

## 2024-02-15 PROCEDURE — G8400 PT W/DXA NO RESULTS DOC: HCPCS | Performed by: ORTHOPAEDIC SURGERY

## 2024-02-15 PROCEDURE — 3017F COLORECTAL CA SCREEN DOC REV: CPT | Performed by: ORTHOPAEDIC SURGERY

## 2024-02-15 PROCEDURE — 1123F ACP DISCUSS/DSCN MKR DOCD: CPT | Performed by: ORTHOPAEDIC SURGERY

## 2024-02-15 PROCEDURE — G8427 DOCREV CUR MEDS BY ELIG CLIN: HCPCS | Performed by: ORTHOPAEDIC SURGERY

## 2024-02-15 PROCEDURE — 99213 OFFICE O/P EST LOW 20 MIN: CPT | Performed by: ORTHOPAEDIC SURGERY

## 2024-02-15 NOTE — PROGRESS NOTES
Name: Cristela Khan  YOB: 1955  Gender: female  MRN: [id number]  Age: 68 y.o.       History of present illness:     She is well-known to our practice.  She has had both extensive cervical and lumbar fusions.  She currently reports mechanical low back pain during prolonged standing and walking.  She also has numbness and tingling in the arms particularly at night which is alleviated by straightening her arms.  She has had MRIs of the cervical and lumbar spine within the last year and those are available for my review.    Physical Exam:    She is awake and oriented in no acute distress.  Mood and affect are appropriate.  She has positive Tinel's over the carpal tunnel bilaterally.  Otherwise, she is neurologically intact in all 4 extremities.  Her gait is upright and unassisted.    Radiographic Studies:     AP and lateral views of the cervical spine obtained in office today show postoperative changes status post both anterior and posterior fusion surgeries.  Both fusions appear to be well consolidated.  She has some loss of lordosis.    Radiographic impression: Satisfactory anterior posterior cervical fusions that are well consolidated and without adjacent level decompensation.    AP and lateral views lumbar spine taken in office today reviewed and interpreted and reveal postoperative changes status post L2-5 laminectomy and fusion.  The fusion appears to be well consolidated.  There is loss of disc height at L5-S1 but no spondylolisthesis.  Otherwise, she is nicely lordotic.    Radiographic impression: Satisfactory changes status post anterior and posterior lumbar fusion without adjacent level instability.  She does have L5-S1 disc degeneration but it appears to be stable collapse.      Diagnosis:      ICD-10-CM    1. Status post lumbar spinal arthrodesis  Z98.1 XR LUMBAR SPINE (2-3 VIEWS)      2. Arthrodesis status  Z98.1 XR CERVICAL SPINE (2-3 VIEWS)          Assessment/Plan:      She has

## 2024-03-18 ENCOUNTER — OFFICE VISIT (OUTPATIENT)
Dept: ORTHOPEDIC SURGERY | Age: 69
End: 2024-03-18
Payer: MEDICARE

## 2024-03-18 DIAGNOSIS — M51.36 DISCOGENIC LOW BACK PAIN: Primary | ICD-10-CM

## 2024-03-18 PROCEDURE — 1036F TOBACCO NON-USER: CPT | Performed by: ORTHOPAEDIC SURGERY

## 2024-03-18 PROCEDURE — G8420 CALC BMI NORM PARAMETERS: HCPCS | Performed by: ORTHOPAEDIC SURGERY

## 2024-03-18 PROCEDURE — G8427 DOCREV CUR MEDS BY ELIG CLIN: HCPCS | Performed by: ORTHOPAEDIC SURGERY

## 2024-03-18 PROCEDURE — G8400 PT W/DXA NO RESULTS DOC: HCPCS | Performed by: ORTHOPAEDIC SURGERY

## 2024-03-18 PROCEDURE — 1090F PRES/ABSN URINE INCON ASSESS: CPT | Performed by: ORTHOPAEDIC SURGERY

## 2024-03-18 PROCEDURE — 99213 OFFICE O/P EST LOW 20 MIN: CPT | Performed by: ORTHOPAEDIC SURGERY

## 2024-03-18 PROCEDURE — 3017F COLORECTAL CA SCREEN DOC REV: CPT | Performed by: ORTHOPAEDIC SURGERY

## 2024-03-18 PROCEDURE — G8484 FLU IMMUNIZE NO ADMIN: HCPCS | Performed by: ORTHOPAEDIC SURGERY

## 2024-03-18 PROCEDURE — 1123F ACP DISCUSS/DSCN MKR DOCD: CPT | Performed by: ORTHOPAEDIC SURGERY

## 2024-03-18 NOTE — PROGRESS NOTES
Name: Cristela Khan  YOB: 1955  Gender: female  MRN: [id number]  Age: 68 y.o.       History of present illness:     This is a 68-year-old female who is status post multilevel lumbar laminectomy and fusion.  She reports intermittent leg weakness.  I had recently seen her with similar complaints.  We had MRIs of the cervical and lumbar spine and radiographs.  They are all again available for my review today.    Physical Exam:    She has 5/5 strength in all major muscle groups in all 4 extremities.  She does have a residual Antelmo's on the left.  Her gait is upright and nonantalgic and non spastic.    Radiographic Studies:     I again reviewed her cervical and lumbar MRIs and radiographs.  She does have some loss of disc height at L5-S1 with a central disc bulge but no significant neural impingement.  She is fused from L2-L5 and all that appears appropriate and without residual neural impingement.  There is no significant stenosis cephalad to the fusion.    Diagnosis:      ICD-10-CM    1. Discogenic low back pain  M51.36           Assessment/Plan:      The patient's physical exam and MRIs are all reassuring.  I do not really see any indication for additional imaging at this time.  I would recommend symptomatic care.        Electronically Signed By Jonh Avalos MD   03/18/24  12:33 PM

## 2024-05-05 ENCOUNTER — HOSPITAL ENCOUNTER (EMERGENCY)
Age: 69
Discharge: HOME | End: 2024-05-05
Payer: MEDICARE

## 2024-05-05 VITALS
DIASTOLIC BLOOD PRESSURE: 60 MMHG | SYSTOLIC BLOOD PRESSURE: 123 MMHG | RESPIRATION RATE: 15 BRPM | HEART RATE: 98 BPM | TEMPERATURE: 98.1 F | OXYGEN SATURATION: 97 %

## 2024-05-05 DIAGNOSIS — M54.50 ACUTE LEFT-SIDED LOW BACK PAIN WITHOUT SCIATICA: Primary | ICD-10-CM

## 2024-05-05 PROCEDURE — 99284 EMERGENCY DEPT VISIT MOD MDM: CPT

## 2024-05-05 PROCEDURE — 96372 THER/PROPH/DIAG INJ SC/IM: CPT

## 2024-05-05 PROCEDURE — 6370000000 HC RX 637 (ALT 250 FOR IP): Performed by: PHYSICIAN ASSISTANT

## 2024-05-05 PROCEDURE — 6360000002 HC RX W HCPCS: Performed by: PHYSICIAN ASSISTANT

## 2024-05-05 RX ORDER — MORPHINE SULFATE 4 MG/ML
4 INJECTION, SOLUTION INTRAMUSCULAR; INTRAVENOUS
Status: COMPLETED | OUTPATIENT
Start: 2024-05-05 | End: 2024-05-05

## 2024-05-05 RX ORDER — ONDANSETRON 4 MG/1
4 TABLET, ORALLY DISINTEGRATING ORAL
Status: COMPLETED | OUTPATIENT
Start: 2024-05-05 | End: 2024-05-05

## 2024-05-05 RX ORDER — CYCLOBENZAPRINE HCL 10 MG
10 TABLET ORAL 2 TIMES DAILY PRN
Qty: 20 TABLET | Refills: 0 | Status: SHIPPED | OUTPATIENT
Start: 2024-05-05 | End: 2024-05-15

## 2024-05-05 RX ADMIN — ONDANSETRON 4 MG: 4 TABLET, ORALLY DISINTEGRATING ORAL at 17:48

## 2024-05-05 RX ADMIN — MORPHINE SULFATE 4 MG: 4 INJECTION INTRAVENOUS at 17:48

## 2024-05-05 ASSESSMENT — PAIN - FUNCTIONAL ASSESSMENT: PAIN_FUNCTIONAL_ASSESSMENT: 0-10

## 2024-05-05 ASSESSMENT — PAIN SCALES - GENERAL: PAINLEVEL_OUTOF10: 6

## 2024-05-05 NOTE — ED NOTES
Patient mobility status  with no difficulty. Provider aware     I have reviewed discharge instructions with the patient.  The patient verbalized understanding.    Patient left ED via Discharge Method: ambulatory to Home with Spouse.    Opportunity for questions and clarification provided.     Patient given 1 scripts.

## 2024-05-05 NOTE — DISCHARGE INSTRUCTIONS
Continue at home pain medicine as scheduled.  May start Flexeril twice a day for any back spasms tomorrow.  See your primary care physician or pain management physician or back physician for recheck

## 2024-05-05 NOTE — ED TRIAGE NOTES
Pt reports that she is having lower back pain that started Thursday, denies urinary issues.  Pt reports that she thinks she was carrying too much stuff.  Pt reports is a pain management pt and that she took her pain meds around 1500 w/ no relief.

## 2024-05-08 NOTE — ED PROVIDER NOTES
Normal heart sounds.   Pulmonary:      Effort: Pulmonary effort is normal.      Breath sounds: Normal breath sounds. No rales.   Chest:      Chest wall: No tenderness.   Abdominal:      Tenderness: There is no abdominal tenderness.      Hernia: No hernia is present.   Musculoskeletal:         General: Tenderness present. Normal range of motion.      Cervical back: Normal range of motion and neck supple.      Comments: Patient with pain to palpation left lower back area worse with motion.  No radiation of pain into the buttocks or legs no bony back pain noted no skin changes appreciated no appreciable neurodeficit   Skin:     General: Skin is warm and dry.   Neurological:      General: No focal deficit present.      Mental Status: She is alert.   Psychiatric:         Mood and Affect: Mood normal.         Behavior: Behavior normal.        Procedures     Procedures    No orders of the defined types were placed in this encounter.       Medications given during this emergency department visit:  Medications   morphine sulfate (PF) injection 4 mg (4 mg IntraMUSCular Given 24)   ondansetron (ZOFRAN-ODT) disintegrating tablet 4 mg (4 mg Oral Given 24)       Discharge Medication List as of 2024  6:38 PM        START taking these medications    Details   cyclobenzaprine (FLEXERIL) 10 MG tablet Take 1 tablet by mouth 2 times daily as needed for Muscle spasms, Disp-20 tablet, R-0Normal              Past Medical History:   Diagnosis Date    Abnormal Papanicolaou smear of cervix     ASCUS    Anemia     Arthritis     pt says if she has it it's mild    Bowel trouble     Chronic pain     lumbar/cervical    Depression     Ear problems     Elective      Fatigue 2012    GERD (gastroesophageal reflux disease)     controlled with med    History of lumbar laminectomy 2019    L3-5    Hypertension     controlled with med    Menopause     Miscarriage     Neck pain 2012    Pain in breast     RIGHT

## 2024-05-10 ENCOUNTER — OFFICE VISIT (OUTPATIENT)
Dept: ORTHOPEDIC SURGERY | Age: 69
End: 2024-05-10
Payer: MEDICARE

## 2024-05-10 DIAGNOSIS — Z98.1 ARTHRODESIS STATUS: Primary | ICD-10-CM

## 2024-05-10 DIAGNOSIS — M84.374A STRESS FRACTURE OF METATARSAL BONE OF RIGHT FOOT, INITIAL ENCOUNTER: ICD-10-CM

## 2024-05-10 PROCEDURE — G8420 CALC BMI NORM PARAMETERS: HCPCS | Performed by: ORTHOPAEDIC SURGERY

## 2024-05-10 PROCEDURE — 1090F PRES/ABSN URINE INCON ASSESS: CPT | Performed by: ORTHOPAEDIC SURGERY

## 2024-05-10 PROCEDURE — 1123F ACP DISCUSS/DSCN MKR DOCD: CPT | Performed by: ORTHOPAEDIC SURGERY

## 2024-05-10 PROCEDURE — 99214 OFFICE O/P EST MOD 30 MIN: CPT | Performed by: ORTHOPAEDIC SURGERY

## 2024-05-10 PROCEDURE — 1036F TOBACCO NON-USER: CPT | Performed by: ORTHOPAEDIC SURGERY

## 2024-05-10 PROCEDURE — G8400 PT W/DXA NO RESULTS DOC: HCPCS | Performed by: ORTHOPAEDIC SURGERY

## 2024-05-10 PROCEDURE — G8428 CUR MEDS NOT DOCUMENT: HCPCS | Performed by: ORTHOPAEDIC SURGERY

## 2024-05-10 PROCEDURE — 3017F COLORECTAL CA SCREEN DOC REV: CPT | Performed by: ORTHOPAEDIC SURGERY

## 2024-05-10 NOTE — PROGRESS NOTES
Name: Cristela Khan  YOB: 1955  Gender: female  MRN: [id number]    Summary:   Right third metatarsal stress fracture       CC: Follow-up (Right first MTP arthrodesis and left wrist pain/XR obtained today)       HPI: Cristela Khan is a 68 y.o. female who presents with Follow-up (Right first MTP arthrodesis and left wrist pain/XR obtained today)  .  This patient presents back to the office today with some complaints of pain and swelling in the lateral aspect of her right foot over the last 6 weeks.    History was obtained by Patient     ROS/Meds/PSH/PMH/FH/SH: I personally reviewed the patients standard intake form.  Below are the pertinents    Tobacco:  reports that she has never smoked. She has never used smokeless tobacco.  Diabetes: None      Physical Examination:    Exam of the right lower extremity shows a well-healed surgical incision.  She is a solid fusion.  She does have some swelling and tenderness palpation of the distal third metatarsal.    Imaging:   Interpretation of imaging  Right foot XR: AP, Lateral, Oblique views     ICD-10-CM    1. Arthrodesis status  Z98.1 XR FOOT RIGHT (MIN 3 VIEWS)      2. Stress fracture of metatarsal bone of right foot, initial encounter  M84.374A          Report: AP, lateral, oblique x-ray of the right foot demonstrates definite new third metatarsal stress fracture    Impression: Definite new third metatarsal stress fracture   CHARLENE ANGUIANO III, MD           Assessment:   Right definite new third metatarsal stress fracture    Treatment Plan:   4 This is a chronic illness/condition with exacerbation and progression  Treatment at this time: Time with no intervention  Studies ordered: NO XR needed @ Next Visit    Weight-bearing status: WBAT        Return to work/work restrictions: none  No medications given    She will continue with weightbearing as tolerated in comfortable shoes.  I discussed with her if it goes 6 weeks longer to come back and we can take a

## 2024-05-13 ENCOUNTER — OFFICE VISIT (OUTPATIENT)
Age: 69
End: 2024-05-13
Payer: MEDICARE

## 2024-05-13 DIAGNOSIS — Z98.1 ARTHRODESIS STATUS: Primary | ICD-10-CM

## 2024-05-13 PROCEDURE — 1123F ACP DISCUSS/DSCN MKR DOCD: CPT | Performed by: ORTHOPAEDIC SURGERY

## 2024-05-13 PROCEDURE — G8420 CALC BMI NORM PARAMETERS: HCPCS | Performed by: ORTHOPAEDIC SURGERY

## 2024-05-13 PROCEDURE — 3017F COLORECTAL CA SCREEN DOC REV: CPT | Performed by: ORTHOPAEDIC SURGERY

## 2024-05-13 PROCEDURE — 1036F TOBACCO NON-USER: CPT | Performed by: ORTHOPAEDIC SURGERY

## 2024-05-13 PROCEDURE — G8427 DOCREV CUR MEDS BY ELIG CLIN: HCPCS | Performed by: ORTHOPAEDIC SURGERY

## 2024-05-13 PROCEDURE — G8400 PT W/DXA NO RESULTS DOC: HCPCS | Performed by: ORTHOPAEDIC SURGERY

## 2024-05-13 PROCEDURE — 1090F PRES/ABSN URINE INCON ASSESS: CPT | Performed by: ORTHOPAEDIC SURGERY

## 2024-05-13 PROCEDURE — 99213 OFFICE O/P EST LOW 20 MIN: CPT | Performed by: ORTHOPAEDIC SURGERY

## 2024-05-13 NOTE — PROGRESS NOTES
Name: Cristela Khan  YOB: 1955  Gender: female  MRN: [id number]  Age: 68 y.o.       History of present illness:     She is well-known to the practice and is status post multilevel lumbar laminectomy and fusion.  She was in the emergency department about a week or so ago with increased back pain after lifting something relatively incidental.  She was given some muscle relaxants and some pain medication.  Ultimately, she is much better now.  She has followed up with Dr. Carreno who she is with for chronic pain management.    Physical Exam:    She is awake and oriented in no acute distress.  Respirations are unlabored and there is no evidence of cyanosis.  Mood and affect are appropriate.  No focal motor deficit.  Gait is upright.  She has mildly restricted lumbar range of motion from pain.    Radiographic Studies:     AP and lateral views lumbar spine taken in office today show postoperative changes status post multilevel lumbar laminectomy and fusion.  Everything appears to be well consolidated.  She does have progressive loss of disc height at L5-S1 which is now bone-on-bone.  This collapse has has occurred the interim since her MRI about a year ago.    Radiographic impression: L5-S1 disc collapse s/p otherwise appropriate appearing L2-L5 fusion    Diagnosis:      ICD-10-CM    1. Arthrodesis status  Z98.1 XR LUMBAR SPINE (2-3 VIEWS)          Assessment/Plan:      I believe she has discogenic back pain which correlates with the findings at L5-S1 on her plain radiographs.  Fortunately, her pain is gotten a lot better and I do not think we really need to do anything about it today.  We did discuss occasional OTC ibuprofen to supplement her pain management regimen of hydrocodone.  Otherwise, she will continue home exercise and core strengthening program.             Electronically Signed By Jonh Avalos MD   05/13/24  1:33 PM

## 2024-05-14 ENCOUNTER — TELEPHONE (OUTPATIENT)
Dept: ORTHOPEDIC SURGERY | Age: 69
End: 2024-05-14

## 2024-06-03 ENCOUNTER — OFFICE VISIT (OUTPATIENT)
Age: 69
End: 2024-06-03
Payer: MEDICARE

## 2024-06-03 DIAGNOSIS — M54.50 LOW BACK PAIN, UNSPECIFIED BACK PAIN LATERALITY, UNSPECIFIED CHRONICITY, UNSPECIFIED WHETHER SCIATICA PRESENT: Primary | ICD-10-CM

## 2024-06-03 PROCEDURE — G8427 DOCREV CUR MEDS BY ELIG CLIN: HCPCS | Performed by: ORTHOPAEDIC SURGERY

## 2024-06-03 PROCEDURE — 3017F COLORECTAL CA SCREEN DOC REV: CPT | Performed by: ORTHOPAEDIC SURGERY

## 2024-06-03 PROCEDURE — G8400 PT W/DXA NO RESULTS DOC: HCPCS | Performed by: ORTHOPAEDIC SURGERY

## 2024-06-03 PROCEDURE — 99212 OFFICE O/P EST SF 10 MIN: CPT | Performed by: ORTHOPAEDIC SURGERY

## 2024-06-03 PROCEDURE — 1036F TOBACCO NON-USER: CPT | Performed by: ORTHOPAEDIC SURGERY

## 2024-06-03 PROCEDURE — G8420 CALC BMI NORM PARAMETERS: HCPCS | Performed by: ORTHOPAEDIC SURGERY

## 2024-06-03 PROCEDURE — 1090F PRES/ABSN URINE INCON ASSESS: CPT | Performed by: ORTHOPAEDIC SURGERY

## 2024-06-03 PROCEDURE — 1123F ACP DISCUSS/DSCN MKR DOCD: CPT | Performed by: ORTHOPAEDIC SURGERY

## 2024-06-03 NOTE — PROGRESS NOTES
She is well-known to me status post multilevel lumbar laminectomy and fusion.  She has had episodes where her legs want to give out from underneath of her.  He had an MRI last summer which was reassuring from a neural compressive standpoint.    On exam she has 5/5 strength in all major muscle groups representing all myotomes in the lumbar spine.  She has 2+ Achilles tendon and quadriceps reflex.  Sensation is normal throughout both lower extremities.  Her gait is upright and nonantalgic.    I again reviewed her MRI from last June that reveals satisfactory changes status post multilevel lumbar laminectomy and fusion with instrumentation.  There is disc degeneration at L5-S1 below her fusion but no significant neural impingement.    Assessment and plan: I think she has intermittent lumbar radiculitis likely from the loss of disc height at L5-S1.  But, there is no sustained radiculopathy or sustained motor or sensory deficit.  I recommended symptomatic care.

## 2024-06-07 ENCOUNTER — TELEPHONE (OUTPATIENT)
Dept: ORTHOPEDIC SURGERY | Age: 69
End: 2024-06-07

## 2024-06-07 DIAGNOSIS — Z98.1 ARTHRODESIS STATUS: Primary | ICD-10-CM

## 2024-06-07 DIAGNOSIS — M54.50 LOW BACK PAIN, UNSPECIFIED BACK PAIN LATERALITY, UNSPECIFIED CHRONICITY, UNSPECIFIED WHETHER SCIATICA PRESENT: ICD-10-CM

## 2024-06-07 NOTE — TELEPHONE ENCOUNTER
Patient wants to get an MRI with or without contrast, which ever is the best for viewing the back.

## 2024-06-07 NOTE — TELEPHONE ENCOUNTER
Patient aware that insurance may not pay for repeat mri . IN this case, she would have to pay out of pocket per [facility].   Patient requesting [place] Location, phone number given for scheduling for her to contact to schedule.  Patient will be contacted with mri results.

## 2024-06-21 ENCOUNTER — TELEPHONE (OUTPATIENT)
Dept: ORTHOPEDIC SURGERY | Age: 69
End: 2024-06-21

## 2024-06-21 ENCOUNTER — TELEPHONE (OUTPATIENT)
Age: 69
End: 2024-06-21

## 2024-06-21 DIAGNOSIS — M54.2 NECK PAIN: Primary | ICD-10-CM

## 2024-06-21 NOTE — TELEPHONE ENCOUNTER
Sent mri order to the hospital. Hopefully they can coordinate both scans to accommodate the patient

## 2024-06-27 ENCOUNTER — HOSPITAL ENCOUNTER (OUTPATIENT)
Dept: MRI IMAGING | Age: 69
Discharge: HOME | End: 2024-06-27
Attending: ORTHOPAEDIC SURGERY
Payer: MEDICARE

## 2024-06-27 DIAGNOSIS — M54.50 LOW BACK PAIN, UNSPECIFIED BACK PAIN LATERALITY, UNSPECIFIED CHRONICITY, UNSPECIFIED WHETHER SCIATICA PRESENT: ICD-10-CM

## 2024-06-27 DIAGNOSIS — Z98.1 ARTHRODESIS STATUS: ICD-10-CM

## 2024-06-27 PROCEDURE — 72148 MRI LUMBAR SPINE W/O DYE: CPT

## 2024-07-15 ENCOUNTER — OFFICE VISIT (OUTPATIENT)
Age: 69
End: 2024-07-15
Payer: MEDICARE

## 2024-07-15 DIAGNOSIS — Z98.1 ARTHRODESIS STATUS: ICD-10-CM

## 2024-07-15 DIAGNOSIS — M54.16 LUMBAR RADICULOPATHY: ICD-10-CM

## 2024-07-15 DIAGNOSIS — M51.36 DISCOGENIC LOW BACK PAIN: Primary | ICD-10-CM

## 2024-07-15 PROCEDURE — 1090F PRES/ABSN URINE INCON ASSESS: CPT | Performed by: ORTHOPAEDIC SURGERY

## 2024-07-15 PROCEDURE — G8420 CALC BMI NORM PARAMETERS: HCPCS | Performed by: ORTHOPAEDIC SURGERY

## 2024-07-15 PROCEDURE — G8400 PT W/DXA NO RESULTS DOC: HCPCS | Performed by: ORTHOPAEDIC SURGERY

## 2024-07-15 PROCEDURE — G8427 DOCREV CUR MEDS BY ELIG CLIN: HCPCS | Performed by: ORTHOPAEDIC SURGERY

## 2024-07-15 PROCEDURE — 1123F ACP DISCUSS/DSCN MKR DOCD: CPT | Performed by: ORTHOPAEDIC SURGERY

## 2024-07-15 PROCEDURE — 99213 OFFICE O/P EST LOW 20 MIN: CPT | Performed by: ORTHOPAEDIC SURGERY

## 2024-07-15 PROCEDURE — 3017F COLORECTAL CA SCREEN DOC REV: CPT | Performed by: ORTHOPAEDIC SURGERY

## 2024-07-15 PROCEDURE — 1036F TOBACCO NON-USER: CPT | Performed by: ORTHOPAEDIC SURGERY

## 2024-07-15 NOTE — PROGRESS NOTES
Name: Cristela Khan  YOB: 1955  Gender: female  MRN: [id number]  Age: 68 y.o.       History of present illness:     She is here for follow-up of her lumbar MRI.  She continues to have back and bilateral buttock and leg pain that limits walking and standing.  Her symptoms were most noticeable recently when she was at a  and could only stand for a minute or so while the family was walking in.    Physical Exam:    She is awake and oriented in no acute distress.  Respirations are unlabored and there is no evidence of cyanosis.  Mood and affect are appropriate.    Radiographic Studies:     MRI report and images were reviewed and independently interpreted.  She is status post L2-5 laminectomy and fusion which all appears appropriate.  She has significant collapse of the L5-S1 disc with bulging both anteriorly and posteriorly.  There is a paracentral disc herniation year without significant direct nerve root impingement.  She also has some facet arthropathy at L1-L2 but without significant resulting stenosis.    Diagnosis:      ICD-10-CM    1. Discogenic low back pain  M51.36       2. Arthrodesis status  Z98.1       3. Lumbar radiculopathy  M54.16           Assessment/Plan:      The MRI is fairly consistent with findings on an MRI from 2 years ago.  There is not a lot of interval change.  At this point, I do not think she would benefit from additional surgery.  Rather, I recommended ongoing interventional pain management as needed to provide symptomatic relief.         Electronically Signed By Jonh Avalos MD   07/15/24  1:14 PM

## 2024-08-22 ENCOUNTER — HOSPITAL ENCOUNTER (OUTPATIENT)
Age: 69
Discharge: HOME | End: 2024-08-24
Attending: ANESTHESIOLOGY
Payer: MEDICARE

## 2024-08-22 DIAGNOSIS — M54.12 CERVICAL RADICULOPATHY: ICD-10-CM

## 2024-08-22 PROCEDURE — 72141 MRI NECK SPINE W/O DYE: CPT

## 2024-09-03 ENCOUNTER — OFFICE VISIT (OUTPATIENT)
Dept: UROLOGY | Age: 69
End: 2024-09-03
Payer: MEDICARE

## 2024-09-03 ENCOUNTER — TELEPHONE (OUTPATIENT)
Dept: UROLOGY | Age: 69
End: 2024-09-03

## 2024-09-03 DIAGNOSIS — R31.21 ASYMPTOMATIC MICROSCOPIC HEMATURIA: ICD-10-CM

## 2024-09-03 DIAGNOSIS — N28.1 RENAL CYST: ICD-10-CM

## 2024-09-03 DIAGNOSIS — N20.0 KIDNEY STONE: Primary | ICD-10-CM

## 2024-09-03 LAB
BILIRUBIN, URINE, POC: NEGATIVE
BLOOD URINE, POC: NORMAL
GLUCOSE URINE, POC: NEGATIVE
KETONES, URINE, POC: NEGATIVE
LEUKOCYTE ESTERASE, URINE, POC: NEGATIVE
NITRITE, URINE, POC: NEGATIVE
PH, URINE, POC: 6 (ref 4.6–8)
PROTEIN,URINE, POC: NEGATIVE
SPECIFIC GRAVITY, URINE, POC: 1 (ref 1–1.03)
URINALYSIS CLARITY, POC: NORMAL
URINALYSIS COLOR, POC: NORMAL
UROBILINOGEN, POC: NORMAL

## 2024-09-03 PROCEDURE — 1123F ACP DISCUSS/DSCN MKR DOCD: CPT | Performed by: NURSE PRACTITIONER

## 2024-09-03 PROCEDURE — G8420 CALC BMI NORM PARAMETERS: HCPCS | Performed by: NURSE PRACTITIONER

## 2024-09-03 PROCEDURE — 81003 URINALYSIS AUTO W/O SCOPE: CPT | Performed by: NURSE PRACTITIONER

## 2024-09-03 PROCEDURE — 1036F TOBACCO NON-USER: CPT | Performed by: NURSE PRACTITIONER

## 2024-09-03 PROCEDURE — 1090F PRES/ABSN URINE INCON ASSESS: CPT | Performed by: NURSE PRACTITIONER

## 2024-09-03 PROCEDURE — 3017F COLORECTAL CA SCREEN DOC REV: CPT | Performed by: NURSE PRACTITIONER

## 2024-09-03 PROCEDURE — G8400 PT W/DXA NO RESULTS DOC: HCPCS | Performed by: NURSE PRACTITIONER

## 2024-09-03 PROCEDURE — G8428 CUR MEDS NOT DOCUMENT: HCPCS | Performed by: NURSE PRACTITIONER

## 2024-09-03 PROCEDURE — 99204 OFFICE O/P NEW MOD 45 MIN: CPT | Performed by: NURSE PRACTITIONER

## 2024-09-03 RX ORDER — MEDROXYPROGESTERONE ACETATE 2.5 MG/1
2.5 TABLET ORAL DAILY
COMMUNITY
Start: 2024-03-05

## 2024-09-03 RX ORDER — ALPRAZOLAM 0.5 MG
0.25 TABLET ORAL 2 TIMES DAILY PRN
COMMUNITY
Start: 2024-08-12

## 2024-09-03 RX ORDER — OLANZAPINE 5 MG/1
5 TABLET ORAL
COMMUNITY
Start: 2024-03-05

## 2024-09-03 RX ORDER — ESTROGENS, CONJUGATED 1.25 MG
TABLET ORAL
COMMUNITY
Start: 2024-06-13

## 2024-09-03 ASSESSMENT — ENCOUNTER SYMPTOMS
ABDOMINAL PAIN: 0
BLOOD IN STOOL: 0
BACK PAIN: 0
SKIN LESIONS: 0
HEARTBURN: 0
CONSTIPATION: 0
INDIGESTION: 0
COUGH: 0
EYE DISCHARGE: 0
SHORTNESS OF BREATH: 0
WHEEZING: 0
VOMITING: 0
NAUSEA: 0
DIARRHEA: 0
EYE PAIN: 0

## 2024-09-03 NOTE — PROGRESS NOTES
[facility]  [address] [place] [id number]  [phone]          Cristela Khan  : 1955    Chief Complaint   Patient presents with    New Patient    Nephrolithiasis          HPI     Cristela Khan is a 69 y.o. female here today evaluation due to microscopic hematuria and CT findings or renal cyst and also 2mm renal stone. I am not able to view images, but will have them pushed over to our PACs.  See findings below:    FINDINGS:     Kidneys: 2 mm right intrarenal calculus. No solid masses. Large, simple-appearing inferior pole cyst.     Ureters: No ureteral calculi. No filling defects within the opacified collecting system to suggest urothelial lesions.     Urinary Bladder: Grossly normal.     Patient thought she was here to discuss the cyst.  She did not realize that the hematuria was the main issue.  Reviewing microscopic urines in the past she has had at least 3 microscopic urines that show up to 30 RBCs microscopically.    Patient has other issues with chronic back pain.  She has had numerous back procedures.  She tells me she is never had a kidney stone to her knowledge this is a first time that she has known of a stone.  She is accompanied by her  who has seen Dr. Timmons in the past.    She is a non-smoker.  She has had a workup in the past by Dr. Cota.  This was when we were on [address].  She thinks it was probably 15 years ago.  That workup was negative.    Past Medical History:   Diagnosis Date    Abnormal Papanicolaou smear of cervix     ASCUS    Anemia     Arthritis     pt says if she has it it's mild    Bowel trouble     Chronic pain     lumbar/cervical    Depression     Ear problems     Elective      Fatigue 2012    GERD (gastroesophageal reflux disease)     controlled with med    History of lumbar laminectomy 2019    L3-5    Hypertension     controlled with med    Menopause     Miscarriage     Neck pain 2012    Pain in breast

## 2024-09-06 ENCOUNTER — OFFICE VISIT (OUTPATIENT)
Age: 69
End: 2024-09-06

## 2024-09-06 DIAGNOSIS — G56.03 BILATERAL CARPAL TUNNEL SYNDROME: Primary | ICD-10-CM

## 2024-09-06 NOTE — PROGRESS NOTES
Name: Cristela Khan  YOB: 1955  Gender: female  MRN: [id number]  Age: 69 y.o.    Chief Complaint: Upper extremity pain and numbness.    History of present illness:    This is a very pleasant 69 y.o. female who I had seen multiple times for her neck and low back.  She has had surgery in both areas.  Now, she is here with a recent increase in bilateral hand numbness that wakes her up at night and is alleviated somewhat by shaking her hands out.  She had been evaluated by Dr. Luis Antonio godwin at [facility] center.  She had been diagnosed with carpal tunnel and surgery had been discussed.  She was using carpal tunnel braces in the past but she has since lost them because she did not need that.  Now, the symptoms seem to have returned and she is interested in getting some new braces.  The symptoms are slightly worse on the right hand but bothersome on both sides.   She did get a new cervical MRI ordered by Carla Carreno and brings that for my review as well.             Medications:   Prior to Visit Medications    Medication Sig Taking? Authorizing Provider   OLANZapine (ZYPREXA) 5 MG tablet Take 1 tablet by mouth  Meño Reyna MD   ALPRAZolam (XANAX) 0.5 MG tablet Take 0.5 tablets by mouth 2 times daily as needed. Max Daily Amount: 0.5 mg  Patient not taking: Reported on 9/3/2024  Meño Reyna MD   medroxyPROGESTERone (PROVERA) 2.5 MG tablet Take 1 tablet by mouth daily  Meño Reyna MD   PREMARIN 1.25 MG tablet   Meño Reyna MD   vitamin E 1000 units capsule Take 1 capsule by mouth daily  Meño Reyna MD   nystatin-triamcinolone (MYCOLOG II) 491576-9.1 UNIT/GM-% cream Apply topically 2 times daily.  Roxy Coronado APRN - CNP   estrogens conjugated (PREMARIN) 0.625 MG/GM CREA vaginal cream Place 0.5 g vaginally daily  Patient not taking: Reported on 9/3/2024  Reuben Ross MD   amLODIPine (NORVASC) 5 MG tablet TAKE ONE TABLET BY MOUTH ONE TIME

## 2024-09-11 ENCOUNTER — OFFICE VISIT (OUTPATIENT)
Dept: NEUROSURGERY | Age: 69
End: 2024-09-11
Payer: MEDICARE

## 2024-09-11 VITALS
BODY MASS INDEX: 26.22 KG/M2 | WEIGHT: 148 LBS | HEART RATE: 90 BPM | OXYGEN SATURATION: 98 % | DIASTOLIC BLOOD PRESSURE: 79 MMHG | SYSTOLIC BLOOD PRESSURE: 146 MMHG | HEIGHT: 63 IN | TEMPERATURE: 97.3 F

## 2024-09-11 DIAGNOSIS — M54.42 CHRONIC BILATERAL LOW BACK PAIN WITH BILATERAL SCIATICA: ICD-10-CM

## 2024-09-11 DIAGNOSIS — R20.2 NUMBNESS AND TINGLING IN BOTH HANDS: ICD-10-CM

## 2024-09-11 DIAGNOSIS — Z98.1 HISTORY OF LUMBAR FUSION: ICD-10-CM

## 2024-09-11 DIAGNOSIS — Z98.1 HISTORY OF FUSION OF CERVICAL SPINE: Primary | ICD-10-CM

## 2024-09-11 DIAGNOSIS — M54.41 CHRONIC BILATERAL LOW BACK PAIN WITH BILATERAL SCIATICA: ICD-10-CM

## 2024-09-11 DIAGNOSIS — G89.29 CHRONIC BILATERAL LOW BACK PAIN WITH BILATERAL SCIATICA: ICD-10-CM

## 2024-09-11 DIAGNOSIS — R20.0 NUMBNESS AND TINGLING IN BOTH HANDS: ICD-10-CM

## 2024-09-11 PROCEDURE — G8427 DOCREV CUR MEDS BY ELIG CLIN: HCPCS | Performed by: NEUROLOGICAL SURGERY

## 2024-09-11 PROCEDURE — 3017F COLORECTAL CA SCREEN DOC REV: CPT | Performed by: NEUROLOGICAL SURGERY

## 2024-09-11 PROCEDURE — 1123F ACP DISCUSS/DSCN MKR DOCD: CPT | Performed by: NEUROLOGICAL SURGERY

## 2024-09-11 PROCEDURE — G8419 CALC BMI OUT NRM PARAM NOF/U: HCPCS | Performed by: NEUROLOGICAL SURGERY

## 2024-09-11 PROCEDURE — 1036F TOBACCO NON-USER: CPT | Performed by: NEUROLOGICAL SURGERY

## 2024-09-11 PROCEDURE — 99214 OFFICE O/P EST MOD 30 MIN: CPT | Performed by: NEUROLOGICAL SURGERY

## 2024-09-11 PROCEDURE — 3078F DIAST BP <80 MM HG: CPT | Performed by: NEUROLOGICAL SURGERY

## 2024-09-11 PROCEDURE — G8400 PT W/DXA NO RESULTS DOC: HCPCS | Performed by: NEUROLOGICAL SURGERY

## 2024-09-11 PROCEDURE — 3077F SYST BP >= 140 MM HG: CPT | Performed by: NEUROLOGICAL SURGERY

## 2024-09-11 PROCEDURE — 1090F PRES/ABSN URINE INCON ASSESS: CPT | Performed by: NEUROLOGICAL SURGERY

## 2024-11-15 ENCOUNTER — APPOINTMENT (OUTPATIENT)
Dept: GENERAL RADIOLOGY | Age: 69
End: 2024-11-15
Payer: MEDICARE

## 2024-11-15 ENCOUNTER — HOSPITAL ENCOUNTER (EMERGENCY)
Age: 69
Discharge: HOME | End: 2024-11-15
Payer: MEDICARE

## 2024-11-15 VITALS
OXYGEN SATURATION: 98 % | BODY MASS INDEX: 25.34 KG/M2 | RESPIRATION RATE: 16 BRPM | WEIGHT: 143 LBS | SYSTOLIC BLOOD PRESSURE: 120 MMHG | TEMPERATURE: 98.2 F | HEART RATE: 64 BPM | DIASTOLIC BLOOD PRESSURE: 70 MMHG | HEIGHT: 63 IN

## 2024-11-15 DIAGNOSIS — M79.673 PAIN OF FOOT, UNSPECIFIED LATERALITY: Primary | ICD-10-CM

## 2024-11-15 PROCEDURE — 73630 X-RAY EXAM OF FOOT: CPT

## 2024-11-15 PROCEDURE — 99283 EMERGENCY DEPT VISIT LOW MDM: CPT

## 2024-11-15 RX ORDER — MELOXICAM 15 MG/1
15 TABLET ORAL DAILY
Qty: 90 TABLET | Refills: 1 | Status: SHIPPED | OUTPATIENT
Start: 2024-11-15

## 2024-11-15 ASSESSMENT — PAIN DESCRIPTION - ORIENTATION: ORIENTATION: LEFT

## 2024-11-15 ASSESSMENT — PAIN - FUNCTIONAL ASSESSMENT: PAIN_FUNCTIONAL_ASSESSMENT: 0-10

## 2024-11-15 ASSESSMENT — PAIN DESCRIPTION - LOCATION: LOCATION: FOOT

## 2024-11-15 ASSESSMENT — PAIN SCALES - GENERAL
PAINLEVEL_OUTOF10: 7
PAINLEVEL_OUTOF10: 7

## 2024-11-15 NOTE — ED NOTES
Patient mobility status  with no difficulty.     I have reviewed discharge instructions with the patient.  The patient verbalized understanding.    Patient left ED via Discharge Method: ambulatory to Home with  self .    Opportunity for questions and clarification provided.     Patient given 1 scripts.

## 2024-11-15 NOTE — ED PROVIDER NOTES
Emergency Department Provider Note       PCP: Sheri Chadwick MD   Age: 69 y.o.   Sex: female     DISPOSITION Decision To Discharge 11/15/2024 03:44:56 PM            ICD-10-CM    1. Pain of foot, unspecified laterality  M79.673           Medical Decision Making     Presents with foot pain.  Patient had requested an injection of steroids into her foot.  Discussed that is not an emergency room procedure.  X-ray had been obtained and demonstrated no fracture.  She already has appointment with orthopedics scheduled for follow-up.  Encouraged her to keep this appointment.     1 acute, uncomplicated illness or injury.  Over the counter drug management performed.  Shared medical decision making was utilized in creating the patients health plan today.  I independently ordered and reviewed each unique test.    I reviewed external records: ED visit note from an outside group.  I reviewed external records: provider visit note from PCP.  I reviewed external records: provider visit note from outside specialist.  I reviewed external records: previous lab results from outside ED.  I reviewed external records: previous imaging study including radiologist interpretation.   I interpreted the X-rays no fracture.    History     The patient is a 69-year-old female who presents here with nontraumatic onset of left foot pain.  States for several weeks.  She had a recent visit with her PCP and discussed these issues, that provider referred her to orthopedics and this appointment is scheduled in future but she did not want to wait.  Presented here hoping for a steroid injection of sorts into the problematic area of her foot.  She is ambulatory on it with a nonantalgic gait.  Has not taken any medications by mouth.    The history is provided by the patient. No  was used.     Physical Exam     Vitals signs and nursing note reviewed:  Vitals:    11/15/24 1354   BP: 121/66   Pulse: 78   Resp: 18   Temp: 98.2 °F (36.8 °C)

## 2024-11-15 NOTE — DISCHARGE INSTRUCTIONS
As discussed, no abnormality was seen on x-ray.  You need to follow-up with the orthopedic specialist to have injections done into your foot.  That is not a procedure that we performed in the emergency department.  In the meantime, continue the medications I have given you for symptomatic relief.  I sent the prescription to the [facility] in [place] on [facility].

## 2024-11-25 ENCOUNTER — OFFICE VISIT (OUTPATIENT)
Dept: ORTHOPEDIC SURGERY | Age: 69
End: 2024-11-25
Payer: MEDICARE

## 2024-11-25 DIAGNOSIS — M84.375A STRESS FRACTURE OF METATARSAL BONE OF LEFT FOOT, INITIAL ENCOUNTER: Primary | ICD-10-CM

## 2024-11-25 PROCEDURE — G8400 PT W/DXA NO RESULTS DOC: HCPCS | Performed by: ORTHOPAEDIC SURGERY

## 2024-11-25 PROCEDURE — 1036F TOBACCO NON-USER: CPT | Performed by: ORTHOPAEDIC SURGERY

## 2024-11-25 PROCEDURE — 1123F ACP DISCUSS/DSCN MKR DOCD: CPT | Performed by: ORTHOPAEDIC SURGERY

## 2024-11-25 PROCEDURE — 3017F COLORECTAL CA SCREEN DOC REV: CPT | Performed by: ORTHOPAEDIC SURGERY

## 2024-11-25 PROCEDURE — 99214 OFFICE O/P EST MOD 30 MIN: CPT | Performed by: ORTHOPAEDIC SURGERY

## 2024-11-25 PROCEDURE — 1090F PRES/ABSN URINE INCON ASSESS: CPT | Performed by: ORTHOPAEDIC SURGERY

## 2024-11-25 PROCEDURE — G8419 CALC BMI OUT NRM PARAM NOF/U: HCPCS | Performed by: ORTHOPAEDIC SURGERY

## 2024-11-25 PROCEDURE — G8428 CUR MEDS NOT DOCUMENT: HCPCS | Performed by: ORTHOPAEDIC SURGERY

## 2024-11-25 PROCEDURE — M5009 MISC POST OP SHOE: HCPCS | Performed by: ORTHOPAEDIC SURGERY

## 2024-11-25 PROCEDURE — G8484 FLU IMMUNIZE NO ADMIN: HCPCS | Performed by: ORTHOPAEDIC SURGERY

## 2024-11-25 NOTE — PROGRESS NOTES
Name: Cristela Khan  YOB: 1955  Gender: female  MRN: [id number]    Summary:     Left probable second metatarsal stress fracture     CC: Follow-up (Left foot pain went to er 11/15/2024)       HPI: Cristela Khan is a 69 y.o. female who presents with Follow-up (Left foot pain went to er 11/15/2024)  .  This patient presents to the office today with a 3-week history of pain located in her left foot.  She is seen in emergency room and an x-ray performed.    History was obtained by Patient     ROS/Meds/PSH/PMH/FH/SH: I personally reviewed the patients standard intake form.  Below are the pertinents    Tobacco:  reports that she has never smoked. She has never used smokeless tobacco.  Diabetes: None      Physical Examination:    Exam of the left foot shows swelling and tenderness palpation on the second metatarsal.  There is mild pain at the first MTP joint with expected limited range of motion.    Imaging:   I independently interpreted XR ordered by a different physician, taken from an outside facility of the left foot which shows significant hallux rigidus with no definite fracture           CHARLENE ANGUIANO III, MD           Assessment:   Left hallux rigidus with probable second metatarsal stress fracture    Treatment Plan:   4 This is a chronic illness/condition with exacerbation and progression  Treatment at this time: Closed treatment of fracture without manipulation which is an elective major procedure without identified risk factors  Studies ordered: Foot XR needed @ Next Visit    Weight-bearing status: WBAT        Return to work/work restrictions: none  No medications given    She is placed in a postop shoe today weightbearing as tolerated to immobilize the fracture.  To return in 6 weeks for x-ray.

## 2024-11-27 NOTE — PROGRESS NOTES
The patient was prescribed and fitted with a post op shoe for the left foot, size XS.     Patient read and signed documenting they understand and agree to [facility]'s current DME return policy.

## 2024-12-12 ENCOUNTER — HOSPITAL ENCOUNTER (EMERGENCY)
Age: 69
Discharge: HOME | End: 2024-12-12
Payer: MEDICARE

## 2024-12-12 VITALS
HEART RATE: 78 BPM | OXYGEN SATURATION: 100 % | TEMPERATURE: 97.5 F | SYSTOLIC BLOOD PRESSURE: 152 MMHG | HEIGHT: 63 IN | BODY MASS INDEX: 25.69 KG/M2 | RESPIRATION RATE: 17 BRPM | DIASTOLIC BLOOD PRESSURE: 71 MMHG | WEIGHT: 145 LBS

## 2024-12-12 DIAGNOSIS — K52.9 GASTROENTERITIS: Primary | ICD-10-CM

## 2024-12-12 LAB
ALBUMIN SERPL-MCNC: 4 G/DL (ref 3.2–4.6)
ALBUMIN/GLOB SERPL: 1.3 (ref 1–1.9)
ALP SERPL-CCNC: 62 U/L (ref 35–104)
ALT SERPL-CCNC: <5 U/L (ref 12–65)
ANION GAP SERPL CALC-SCNC: 12 MMOL/L (ref 7–16)
APPEARANCE UR: CLEAR
AST SERPL-CCNC: 14 U/L (ref 15–37)
BACTERIA URNS QL MICRO: 0 /HPF
BASOPHILS # BLD: 0 K/UL (ref 0–0.2)
BASOPHILS NFR BLD: 1 % (ref 0–2)
BILIRUB SERPL-MCNC: 0.2 MG/DL (ref 0–1.2)
BILIRUB UR QL: NEGATIVE
BUN SERPL-MCNC: 10 MG/DL (ref 8–23)
CALCIUM SERPL-MCNC: 9.5 MG/DL (ref 8.8–10.2)
CHLORIDE SERPL-SCNC: 102 MMOL/L (ref 98–107)
CO2 SERPL-SCNC: 26 MMOL/L (ref 20–29)
COLOR UR: YELLOW
CREAT SERPL-MCNC: 0.66 MG/DL (ref 0.8–1.3)
DIFFERENTIAL METHOD BLD: NORMAL
EOSINOPHIL # BLD: 0.1 K/UL (ref 0–0.8)
EOSINOPHIL NFR BLD: 1 % (ref 0.5–7.8)
EPI CELLS #/AREA URNS HPF: NORMAL /HPF
ERYTHROCYTE [DISTWIDTH] IN BLOOD BY AUTOMATED COUNT: 12.1 % (ref 11.9–14.6)
GLOBULIN SER CALC-MCNC: 3.1 G/DL (ref 2.3–3.5)
GLUCOSE SERPL-MCNC: 98 MG/DL (ref 65–100)
GLUCOSE UR STRIP.AUTO-MCNC: NEGATIVE MG/DL
HCT VFR BLD AUTO: 39.4 % (ref 35.8–46.3)
HGB BLD-MCNC: 13.3 G/DL (ref 11.7–15.4)
HGB UR QL STRIP: ABNORMAL
IMM GRANULOCYTES # BLD AUTO: 0 K/UL (ref 0–0.5)
IMM GRANULOCYTES NFR BLD AUTO: 0 % (ref 0–5)
KETONES UR QL STRIP.AUTO: NEGATIVE MG/DL
LACTATE SERPL-SCNC: 1.6 MMOL/L (ref 0.5–2)
LEUKOCYTE ESTERASE UR QL STRIP.AUTO: NEGATIVE
LIPASE SERPL-CCNC: 58 U/L (ref 13–60)
LYMPHOCYTES # BLD: 2.2 K/UL (ref 0.5–4.6)
LYMPHOCYTES NFR BLD: 29 % (ref 13–44)
MAGNESIUM SERPL-MCNC: 1.7 MG/DL (ref 1.8–2.4)
MCH RBC QN AUTO: 31.7 PG (ref 26.1–32.9)
MCHC RBC AUTO-ENTMCNC: 33.8 G/DL (ref 31.4–35)
MCV RBC AUTO: 94 FL (ref 82–102)
MONOCYTES # BLD: 0.5 K/UL (ref 0.1–1.3)
MONOCYTES NFR BLD: 7 % (ref 4–12)
MUCOUS THREADS URNS QL MICRO: 0 /LPF
NEUTS SEG # BLD: 4.7 K/UL (ref 1.7–8.2)
NEUTS SEG NFR BLD: 62 % (ref 43–78)
NITRITE UR QL STRIP.AUTO: NEGATIVE
NRBC # BLD: 0 K/UL (ref 0–0.2)
OTHER OBSERVATIONS: NORMAL
PH UR STRIP: 6 (ref 5–9)
PLATELET # BLD AUTO: 299 K/UL (ref 150–450)
PMV BLD AUTO: 9.5 FL (ref 9.4–12.3)
POTASSIUM SERPL-SCNC: 3.9 MMOL/L (ref 3.5–5.1)
PROT SERPL-MCNC: 7.1 G/DL (ref 6.3–8.2)
PROT UR STRIP-MCNC: NEGATIVE MG/DL
RBC # BLD AUTO: 4.19 M/UL (ref 4.05–5.2)
RBC #/AREA URNS HPF: NORMAL /HPF
SARS-COV-2 RDRP RESP QL NAA+PROBE: NOT DETECTED
SODIUM SERPL-SCNC: 140 MMOL/L (ref 133–143)
SOURCE: NORMAL
SP GR UR REFRACTOMETRY: 1.02 (ref 1–1.02)
UROBILINOGEN UR QL STRIP.AUTO: 0.2 EU/DL (ref 0.2–1)
WBC # BLD AUTO: 7.5 K/UL (ref 4.3–11.1)
WBC URNS QL MICRO: NORMAL /HPF

## 2024-12-12 PROCEDURE — 83735 ASSAY OF MAGNESIUM: CPT

## 2024-12-12 PROCEDURE — 2580000003 HC RX 258: Performed by: PHYSICIAN ASSISTANT

## 2024-12-12 PROCEDURE — 87635 SARS-COV-2 COVID-19 AMP PRB: CPT

## 2024-12-12 PROCEDURE — 80053 COMPREHEN METABOLIC PANEL: CPT

## 2024-12-12 PROCEDURE — 83690 ASSAY OF LIPASE: CPT

## 2024-12-12 PROCEDURE — 83605 ASSAY OF LACTIC ACID: CPT

## 2024-12-12 PROCEDURE — 99284 EMERGENCY DEPT VISIT MOD MDM: CPT

## 2024-12-12 PROCEDURE — 81001 URINALYSIS AUTO W/SCOPE: CPT

## 2024-12-12 PROCEDURE — 85025 COMPLETE CBC W/AUTO DIFF WBC: CPT

## 2024-12-12 RX ORDER — DICYCLOMINE HCL 20 MG
20 TABLET ORAL 4 TIMES DAILY
Qty: 40 TABLET | Refills: 0 | Status: SHIPPED | OUTPATIENT
Start: 2024-12-12 | End: 2024-12-22

## 2024-12-12 RX ORDER — 0.9 % SODIUM CHLORIDE 0.9 %
500 INTRAVENOUS SOLUTION INTRAVENOUS
Status: COMPLETED | OUTPATIENT
Start: 2024-12-12 | End: 2024-12-12

## 2024-12-12 RX ORDER — ONDANSETRON 4 MG/1
4 TABLET, ORALLY DISINTEGRATING ORAL 3 TIMES DAILY PRN
Qty: 21 TABLET | Refills: 0 | Status: SHIPPED | OUTPATIENT
Start: 2024-12-12

## 2024-12-12 RX ADMIN — SODIUM CHLORIDE 500 ML: 9 INJECTION, SOLUTION INTRAVENOUS at 12:24

## 2024-12-12 ASSESSMENT — PAIN - FUNCTIONAL ASSESSMENT: PAIN_FUNCTIONAL_ASSESSMENT: NONE - DENIES PAIN

## 2024-12-12 NOTE — ED TRIAGE NOTES
Pt ambulatory to triage with friend for reports of generalized weakness, nausea & diarrhea. Pt states she started having symptoms last Tuesday. Pt states she has been trying to rest & thought her symptoms would go away but they did not. Pt states she has no taste or smell & her appetite has been bad.

## 2024-12-12 NOTE — ED NOTES
Patient mobility status  with no difficulty.     I have reviewed discharge instructions with the patient.  The patient verbalized understanding.    Patient left ED via Discharge Method: ambulatory to Home with Friend.    Opportunity for questions and clarification provided.     Patient given 1 scripts.

## 2024-12-12 NOTE — ED PROVIDER NOTES
Emergency Department Provider Note       PCP: Sheri Chadwick MD   Age: 69 y.o.   Sex: female     DISPOSITION Decision To Discharge 12/12/2024 02:20:53 PM    ICD-10-CM    1. Gastroenteritis  K52.9           Medical Decision Making     Ms. Archibald is a 69-year-old female who presented to the emergency department today with a significant past medical history of high blood pressure.  Her chief complaint was initial nausea diarrhea and stomach pain starting last Tuesday.  She states her last bout of this was roughly 2 days ago but generally felt unwell and weak and that is why she presented to the ED. abdominal exam was benign without signs of acute abdomen or peritoneal signs.  At this time low concern for PID or torsion.  Lactic acid and lipase are also within normal limits.  Low concern for ischemic or pancreatic pathology.  Patient's temp and pulse along with respirations and oxygen sat and white blood cell count were also within normal limits low concern for peptic ulcer disease gastric perforation or other acute infectious process.  Urinalysis was negative for leukocytes and bacteria.  Low concern for acute cystitis.  Presentation does not appear consistent with any other acute or emergent pathology.  Most likely viral gastroenteritis.  Patient states she would like a little something for nausea and she was prescribed Zofran at discharge.  Patient was able to drink a whole can of ginger ale prior to discharge.  We did discuss possibly completing a CT abdomen with the patient and family member at bedside.  At this time based on my low clinical suspicion for any acute pathology and discussion with the patient we held off on a CT scan at this time.  Strict return precautions were given.      ED Course as of 12/12/24 1429   Thu Dec 12, 2024   1302 Patient reevaluation: No changes to belly exam.  Ginger ale given for oral intake test.  Daughter still at bedside [CL]      ED Course User Index  [CL] Rick Billingsley

## 2024-12-12 NOTE — DISCHARGE INSTRUCTIONS
Overall today, lab work is reassuring without any significant signs of abnormal lab work.  Likely gastroenteritis.  Recommend oral intake as much as tolerated.  You are prescribed Zofran at discharge.  Use for nausea as directed.

## 2025-02-04 ENCOUNTER — TELEPHONE (OUTPATIENT)
Dept: OBGYN CLINIC | Age: 70
End: 2025-02-04

## 2025-02-04 NOTE — TELEPHONE ENCOUNTER
Pt reports BTB that happens 1-2x year. Pt has hx of thin lining.  Pt reports current bleeding is heavy, changing pad out three times a day. Pt states she has had multiple ultrasounds and pelvic exams where Dr. Ross states he does not see anything wrong.

## 2025-03-03 ENCOUNTER — OFFICE VISIT (OUTPATIENT)
Dept: OBGYN CLINIC | Age: 70
End: 2025-03-03
Payer: MEDICARE

## 2025-03-03 ENCOUNTER — PROCEDURE VISIT (OUTPATIENT)
Dept: OBGYN CLINIC | Age: 70
End: 2025-03-03
Payer: MEDICARE

## 2025-03-03 VITALS
HEIGHT: 63 IN | SYSTOLIC BLOOD PRESSURE: 130 MMHG | WEIGHT: 139.5 LBS | BODY MASS INDEX: 24.72 KG/M2 | DIASTOLIC BLOOD PRESSURE: 80 MMHG

## 2025-03-03 DIAGNOSIS — N90.89 VULVAR IRRITATION: ICD-10-CM

## 2025-03-03 DIAGNOSIS — N95.0 PMB (POSTMENOPAUSAL BLEEDING): Primary | ICD-10-CM

## 2025-03-03 DIAGNOSIS — N95.0 POSTMENOPAUSAL BLEEDING: ICD-10-CM

## 2025-03-03 DIAGNOSIS — F41.8 DEPRESSION WITH ANXIETY: ICD-10-CM

## 2025-03-03 DIAGNOSIS — N89.8 VAGINAL DRYNESS: ICD-10-CM

## 2025-03-03 DIAGNOSIS — N89.8 VAGINAL DISCHARGE: Primary | ICD-10-CM

## 2025-03-03 PROCEDURE — G8427 DOCREV CUR MEDS BY ELIG CLIN: HCPCS | Performed by: OBSTETRICS & GYNECOLOGY

## 2025-03-03 PROCEDURE — 1090F PRES/ABSN URINE INCON ASSESS: CPT | Performed by: OBSTETRICS & GYNECOLOGY

## 2025-03-03 PROCEDURE — 3079F DIAST BP 80-89 MM HG: CPT | Performed by: OBSTETRICS & GYNECOLOGY

## 2025-03-03 PROCEDURE — G8420 CALC BMI NORM PARAMETERS: HCPCS | Performed by: OBSTETRICS & GYNECOLOGY

## 2025-03-03 PROCEDURE — 3017F COLORECTAL CA SCREEN DOC REV: CPT | Performed by: OBSTETRICS & GYNECOLOGY

## 2025-03-03 PROCEDURE — 1123F ACP DISCUSS/DSCN MKR DOCD: CPT | Performed by: OBSTETRICS & GYNECOLOGY

## 2025-03-03 PROCEDURE — 76830 TRANSVAGINAL US NON-OB: CPT | Performed by: OBSTETRICS & GYNECOLOGY

## 2025-03-03 PROCEDURE — 99214 OFFICE O/P EST MOD 30 MIN: CPT | Performed by: OBSTETRICS & GYNECOLOGY

## 2025-03-03 PROCEDURE — 1036F TOBACCO NON-USER: CPT | Performed by: OBSTETRICS & GYNECOLOGY

## 2025-03-03 PROCEDURE — 3075F SYST BP GE 130 - 139MM HG: CPT | Performed by: OBSTETRICS & GYNECOLOGY

## 2025-03-03 PROCEDURE — G8400 PT W/DXA NO RESULTS DOC: HCPCS | Performed by: OBSTETRICS & GYNECOLOGY

## 2025-03-03 PROCEDURE — 1159F MED LIST DOCD IN RCRD: CPT | Performed by: OBSTETRICS & GYNECOLOGY

## 2025-03-03 RX ORDER — CLOBETASOL PROPIONATE 0.5 MG/G
OINTMENT TOPICAL
Qty: 60 G | Refills: 0 | Status: SHIPPED | OUTPATIENT
Start: 2025-03-03

## 2025-03-03 RX ORDER — CARIPRAZINE 3 MG/1
3 CAPSULE, GELATIN COATED ORAL DAILY
COMMUNITY
Start: 2025-02-03

## 2025-03-03 NOTE — PROGRESS NOTES
The patient is a 69 y.o.  who is seen for recheck for heavy PMB. Pt reports bleeding has lightened, no pain, no nausea, no lightheadedness.     US Findings from Today (2025):  CX CONTAINS SIMPLE APPEARING FLUID AND NABOTHIAN CYSTS   UTERUS ANTEVERTED AND HETEROGENOUS WITH FIBROIDS   ENDO= 2.5 MM   F1 ANTERIOR , ABUTTS ENDO SUPERIORLY. POSSIBLY SUBMUSCOSAL   F2 POSTERIOR   F3 RIGHT MID   BILATERAL OVARIES NOT VISUALIZED   NO FREE FLUID OR ADNEXAL MASS   Uterine Volume: 165.214     HISTORY:      No LMP recorded. Patient is postmenopausal.  Sexual History:  has sex with males  Contraception:  post menopausal status  Current Outpatient Medications on File Prior to Visit   Medication Sig Dispense Refill    VRAYLAR 3 MG CAPS capsule Take 1 capsule by mouth daily      ondansetron (ZOFRAN-ODT) 4 MG disintegrating tablet Take 1 tablet by mouth 3 times daily as needed for Nausea or Vomiting 21 tablet 0    meloxicam (MOBIC) 15 MG tablet Take 1 tablet by mouth daily 90 tablet 1    OLANZapine (ZYPREXA) 5 MG tablet Take 1 tablet by mouth      medroxyPROGESTERone (PROVERA) 2.5 MG tablet Take 1 tablet by mouth daily      PREMARIN 1.25 MG tablet       vitamin E 1000 units capsule Take 1 capsule by mouth daily      nystatin-triamcinolone (MYCOLOG II) 022621-0.1 UNIT/GM-% cream Apply topically 2 times daily. 30 g 0    amLODIPine (NORVASC) 5 MG tablet TAKE ONE TABLET BY MOUTH ONE TIME DAILY      buPROPion (WELLBUTRIN XL) 300 MG extended release tablet TAKE ONE TABLET BY MOUTH ONE TIME DAILY      busPIRone (BUSPAR) 15 MG tablet Take 15 mg by mouth 2 times daily      vitamin D (CHOLECALCIFEROL) 25 MCG (1000 UT) TABS tablet Take 5 tablets by mouth daily      HYDROcodone-acetaminophen (NORCO)  MG per tablet Take 1 tablet by mouth 4 times daily as needed.      hydrOXYzine (VISTARIL) 100 MG capsule Take 1 capsule by mouth 3 times daily as needed      pantoprazole (PROTONIX) 40 MG tablet TAKE ONE TABLET BY MOUTH ONE

## 2025-03-05 LAB
A VAGINAE DNA VAG QL NAA+PROBE: NORMAL SCORE
BVAB2 DNA VAG QL NAA+PROBE: NORMAL SCORE
C ALBICANS DNA VAG QL NAA+PROBE: NEGATIVE
C GLABRATA DNA VAG QL NAA+PROBE: NEGATIVE
MEGA1 DNA VAG QL NAA+PROBE: NORMAL SCORE
SPECIMEN SOURCE: NORMAL

## 2025-03-06 ENCOUNTER — TELEPHONE (OUTPATIENT)
Dept: OBGYN CLINIC | Age: 70
End: 2025-03-06

## 2025-03-06 NOTE — TELEPHONE ENCOUNTER
----- Message from Dr. Reuben Ross MD sent at 3/6/2025  8:20 AM EST -----  Please let her know ointment I prescribed is adequate at this point.  Testing is negative for other findings. Alton ARMSTRONG

## 2025-04-25 ENCOUNTER — TRANSCRIBE ORDERS (OUTPATIENT)
Dept: SCHEDULING | Age: 70
End: 2025-04-25

## 2025-04-25 DIAGNOSIS — Z12.31 OTHER SCREENING MAMMOGRAM: Primary | ICD-10-CM

## 2025-05-29 ENCOUNTER — HOSPITAL ENCOUNTER (OUTPATIENT)
Dept: MAMMOGRAPHY | Age: 70
Discharge: HOME | End: 2025-05-29
Payer: MEDICARE

## 2025-05-29 DIAGNOSIS — Z12.31 OTHER SCREENING MAMMOGRAM: ICD-10-CM

## 2025-05-29 PROCEDURE — 77067 SCR MAMMO BI INCL CAD: CPT

## 2025-05-29 PROCEDURE — 77063 BREAST TOMOSYNTHESIS BI: CPT

## 2025-05-30 RX ORDER — DESVENLAFAXINE 50 MG/1
50 TABLET, FILM COATED, EXTENDED RELEASE ORAL DAILY
COMMUNITY
Start: 2025-05-15

## 2025-05-30 RX ORDER — DOXEPIN 6 MG/1
1 TABLET, FILM COATED ORAL NIGHTLY
COMMUNITY

## 2025-05-30 RX ORDER — DESVENLAFAXINE 25 MG/1
TABLET, EXTENDED RELEASE ORAL
COMMUNITY
Start: 2025-04-16

## 2025-05-30 RX ORDER — FLUTICASONE PROPIONATE 50 MCG
2 SPRAY, SUSPENSION (ML) NASAL DAILY
COMMUNITY
Start: 2025-04-25

## 2025-05-30 RX ORDER — MECLIZINE HYDROCHLORIDE 25 MG/1
25 TABLET ORAL 3 TIMES DAILY PRN
COMMUNITY
Start: 2025-01-21

## 2025-05-30 RX ORDER — PREDNISONE 10 MG/1
TABLET ORAL
COMMUNITY
Start: 2025-04-25

## 2025-05-30 RX ORDER — PREGABALIN 50 MG/1
CAPSULE ORAL
COMMUNITY
Start: 2025-05-06

## 2025-05-30 RX ORDER — CELECOXIB 100 MG/1
CAPSULE ORAL
COMMUNITY
Start: 2025-02-28

## 2025-05-30 NOTE — PROGRESS NOTES
Name: Cristela Khan  YOB: 1955  Gender: female  MRN: [id number]    CC:   Chief Complaint   Patient presents with    Shoulder Pain     Right    Right shoulder(s) pain, stiffness    History of Present Illness  The patient is a 69-year-old individual who came in today because of arm pain. They have been dealing with pain in their arm for the past 4 years, which they have managed to tolerate. About 4 to 5 weeks ago, they felt a severe burning sensation in their arm, like it was on fire. This pain was really intense during the day but didn't wake them up at night. The pain stopped about 1.5 weeks ago. They haven't taken anything specific for this pain except hydrocodone and haven't used any anti-inflammatories or Tylenol. They don't have a history of injury to their arm. They also mentioned feeling like their shoulder blade might start hurting. They have had tendinitis and bursitis in the past.       5-  Recall: Patient was last seen for her right shoulder on 5- when she received a subacromial injection. Patient reported she had good relief with the injection at last office visit.     Allergies   Allergen Reactions    Latex Itching    Celecoxib Other (See Comments)    Mirtazapine      Drowsiness-Intolerance       Past Medical History:   Diagnosis Date    Abnormal Papanicolaou smear of cervix     ASCUS    Anemia     Arthritis     pt says if she has it it's mild    Bowel trouble     Chronic pain     lumbar/cervical    Depression     Ear problems     Elective      Fatigue 2012    GERD (gastroesophageal reflux disease)     controlled with med    History of lumbar laminectomy 2019    L3-5    Hypertension     controlled with med    Menopause     Miscarriage     Neck pain 2012    Pain in breast     RIGHT    Pelvis fracture, right (HCC)     onset age 59    Psychiatric disorder     depression and anxiety    Reflux esophagitis 2012    Sinus congestion 2014

## 2025-06-05 ENCOUNTER — OFFICE VISIT (OUTPATIENT)
Dept: ORTHOPEDIC SURGERY | Age: 70
End: 2025-06-05
Payer: MEDICARE

## 2025-06-05 DIAGNOSIS — M25.511 RIGHT SHOULDER PAIN, UNSPECIFIED CHRONICITY: Primary | ICD-10-CM

## 2025-06-05 DIAGNOSIS — M75.41 IMPINGEMENT SYNDROME OF RIGHT SHOULDER: ICD-10-CM

## 2025-06-05 DIAGNOSIS — M75.101 NONTRAUMATIC TEAR OF RIGHT ROTATOR CUFF, UNSPECIFIED TEAR EXTENT: ICD-10-CM

## 2025-06-05 PROCEDURE — 99204 OFFICE O/P NEW MOD 45 MIN: CPT | Performed by: ORTHOPAEDIC SURGERY

## 2025-06-05 PROCEDURE — G8420 CALC BMI NORM PARAMETERS: HCPCS | Performed by: ORTHOPAEDIC SURGERY

## 2025-06-05 PROCEDURE — 1090F PRES/ABSN URINE INCON ASSESS: CPT | Performed by: ORTHOPAEDIC SURGERY

## 2025-06-05 PROCEDURE — G8400 PT W/DXA NO RESULTS DOC: HCPCS | Performed by: ORTHOPAEDIC SURGERY

## 2025-06-05 PROCEDURE — G8428 CUR MEDS NOT DOCUMENT: HCPCS | Performed by: ORTHOPAEDIC SURGERY

## 2025-06-05 PROCEDURE — 1123F ACP DISCUSS/DSCN MKR DOCD: CPT | Performed by: ORTHOPAEDIC SURGERY

## 2025-06-05 PROCEDURE — 3017F COLORECTAL CA SCREEN DOC REV: CPT | Performed by: ORTHOPAEDIC SURGERY

## 2025-06-05 PROCEDURE — 1036F TOBACCO NON-USER: CPT | Performed by: ORTHOPAEDIC SURGERY

## 2025-07-15 ENCOUNTER — TELEPHONE (OUTPATIENT)
Dept: ORTHOPEDIC SURGERY | Age: 70
End: 2025-07-15

## 2025-07-15 NOTE — TELEPHONE ENCOUNTER
----- Message from Trudy BOOGIE sent at 7/15/2025 10:01 AM EDT -----  Regarding: GILBERTO Specialty Message to Provider  Specialty Message to Provider    Relationship to Patient: Self     Patient Message: pt has called today and states she is in  pain mgt for the neck and is having some twitching and they have  started her on oxycodone   she is having trouble getting thru to her pain mgt group and feels  she needs a new  MRI  can he  help  her?   --------------------------------------------------------------------------------------------------------------------------    Call Back Information: OK to leave message on voicemail  Preferred Call Back Number: Phone  [phone]

## 2025-08-19 ENCOUNTER — HOSPITAL ENCOUNTER (OUTPATIENT)
Age: 70
Discharge: HOME | End: 2025-08-21
Attending: ANESTHESIOLOGY
Payer: MEDICARE

## 2025-08-19 DIAGNOSIS — M54.16 LUMBAR RADICULOPATHY: ICD-10-CM

## 2025-08-19 PROCEDURE — 72148 MRI LUMBAR SPINE W/O DYE: CPT

## (undated) DEVICE — 3M™ TEGADERM™ TRANSPARENT FILM DRESSING FRAME STYLE, 1626W, 4 IN X 4-3/4 IN (10 CM X 12 CM), 50/CT 4CT/CASE: Brand: 3M™ TEGADERM™

## (undated) DEVICE — 2000CC GUARDIAN II: Brand: GUARDIAN

## (undated) DEVICE — SURGIFOAM SPNG SZ 100

## (undated) DEVICE — BANDAGE COBAN 4 IN COMPR W4INXL5YD FOAM COHESIVE QUIK STK SELF ADH SFT

## (undated) DEVICE — SOLUTION IV 1000ML 0.9% SOD CHL

## (undated) DEVICE — ZIMMER® STERILE DISPOSABLE TOURNIQUET CUFF WITH PLC, DUAL PORT, SINGLE BLADDER, 18 IN. (46 CM)

## (undated) DEVICE — TRAY CATH OD16FR SIL URIN M STATLOK STBL DEV SURSTP

## (undated) DEVICE — SUTURE VCRL + 3-0 L27IN ABSRB UD PS-2 L19MM 3/8 CIR PRIM VCP427H

## (undated) DEVICE — KENDALL SCD EXPRESS SLEEVES, KNEE LENGTH, MEDIUM: Brand: KENDALL SCD

## (undated) DEVICE — LIF PTP EXPOSURE GUARD, 11MM: Brand: LIF-PTP

## (undated) DEVICE — SAFEOP STIMULATING CLIP, STERILE: Brand: SAFEOP

## (undated) DEVICE — SAFEOP INSULATED DILATOR KIT, STERILE: Brand: SAFEOP

## (undated) DEVICE — GLOVE SURG SZ 8 L12IN FNGR THK79MIL GRN LTX FREE

## (undated) DEVICE — Device

## (undated) DEVICE — Z DUP USE 2701075 SYSTEM SKIN CLSR 42CM DERMBND PRINEO

## (undated) DEVICE — KIT EVAC 400CC DIA3/16IN H PAT 12.5IN 3 SPR RND SHP PVC DRN

## (undated) DEVICE — MODULAR TAP: Brand: XIA 4.5 SYSTEM -  XIA CT

## (undated) DEVICE — GAUZE,SPONGE,8"X4",12PLY,XRAY,STRL,LF: Brand: MEDLINE

## (undated) DEVICE — REM POLYHESIVE ADULT PATIENT RETURN ELECTRODE: Brand: VALLEYLAB

## (undated) DEVICE — BIPOLAR FORCEPS CORD: Brand: VALLEYLAB

## (undated) DEVICE — DRAPE SHT 3 QTR PROXIMA 53X77 --

## (undated) DEVICE — CARDINAL HEALTH FLEXIBLE LIGHT HANDLE COVER: Brand: CARDINAL HEALTH

## (undated) DEVICE — PTP INTRADISCAL SHIM, NARROW: Brand: LIF-PTP

## (undated) DEVICE — SUTURE VCRL SZ 2-0 L27IN ABSRB UD L36MM CP-1 1/2 CIR REV J266H

## (undated) DEVICE — AGENT HEMSTAT 8ML FLX TIP MTRX + DISP SURGIFLO

## (undated) DEVICE — TROCAR TIP, STAINLESS STEEL GUIDEWIRE, 310MM: Brand: LIF-PTP

## (undated) DEVICE — KIT EVAC 0.13IN RECT TB DIA10FR 400CC PVC 3 SPR Y CONN DRN

## (undated) DEVICE — (D)PREP SKN CHLRAPRP APPL 26ML -- CONVERT TO ITEM 371833

## (undated) DEVICE — DRAPE C-ARMOUR C-ARM KIT --

## (undated) DEVICE — BUTTON SWITCH PENCIL BLADE ELECTRODE, HOLSTER: Brand: EDGE

## (undated) DEVICE — SUTURE NRLN SZ 4-0 L18IN NONABSORBABLE BLK L13MM TF 1/2 CIR C584D

## (undated) DEVICE — SAFEOP EMG/NMJ NEEDLE ELECTRODE & SSEP NEEDLE ELECTRODE PROCEDURE KIT: Brand: SAFEOP

## (undated) DEVICE — SUTURE ETHBND EXCEL SZ 2 L30IN NONABSORBABLE GRN L40MM V-37 MX69G

## (undated) DEVICE — KNIFE

## (undated) DEVICE — CONTAINER,SPECIMEN,O.R.STRL,4.5OZ: Brand: MEDLINE

## (undated) DEVICE — PACK PROCEDURE SURG POST LAMINECTOMY CDS

## (undated) DEVICE — SUT MCRYL + 3-0 27IN PS1 UD --

## (undated) DEVICE — DRILL BIT: Brand: XIA 4.5 SYSTEM -  XIA CT

## (undated) DEVICE — UNIVERSAL DRAPES: Brand: MEDLINE INDUSTRIES, INC.

## (undated) DEVICE — AMD ANTIMICROBIAL GAUZE SPONGES,12 PLY USP TYPE VII, 0.2% POLYHEXAMETHYLENE BIGUANIDE HCI (PHMB): Brand: CURITY

## (undated) DEVICE — TRAY F 16F URIN M

## (undated) DEVICE — BIPOLAR SEALER 23-113-1 AQM 2.3 OM NEURO: Brand: AQUAMANTYS ®

## (undated) DEVICE — DRAIN SURG L49IN DIA1/8IN 10IN H SIL W/O TRCR END PERF

## (undated) DEVICE — INTENDED FOR TISSUE SEPARATION, AND OTHER PROCEDURES THAT REQUIRE A SHARP SURGICAL BLADE TO PUNCTURE OR CUT.: Brand: BARD-PARKER SAFETY BLADES SIZE 15, STERILE

## (undated) DEVICE — SYR LR LCK 1ML GRAD NSAF 30ML --

## (undated) DEVICE — BLADE ASSEMB CLP HAIR FINE --

## (undated) DEVICE — SPONGE TONSIL DBL LG 0.625 IN

## (undated) DEVICE — WAX SURG 2.5GM HEMSTAT BNE BEESWAX PARAFFIN ISO PALMITATE

## (undated) DEVICE — 1010 S-DRAPE TOWEL DRAPE 10/BX: Brand: STERI-DRAPE™

## (undated) DEVICE — 3M™ STERI-DRAPE™ INSTRUMENT POUCH 1018: Brand: STERI-DRAPE™

## (undated) DEVICE — BANDAGE GZ W2XL75IN ST RAYON POLY CNFRM STRTCH LTWT

## (undated) DEVICE — STERILE PACKAGE WITH CANNULA: Brand: LITE BIO DELIVERY SYSTEM

## (undated) DEVICE — GLOVE SURG SZ 65 L12IN FNGR THK79MIL GRN LTX FREE

## (undated) DEVICE — X-RAY SPONGES,12 PLY: Brand: DERMACEA

## (undated) DEVICE — MASTISOL ADHESIVE LIQ 2/3ML

## (undated) DEVICE — INTENDED FOR TISSUE SEPARATION, AND OTHER PROCEDURES THAT REQUIRE A SHARP SURGICAL BLADE TO PUNCTURE OR CUT.: Brand: BARD-PARKER SAFETY BLADES SIZE 10, STERILE

## (undated) DEVICE — 5.0MM PRECISION ROUND

## (undated) DEVICE — SYR 10ML LUER LOK 1/5ML GRAD --

## (undated) DEVICE — FLOSEAL HEMOSTATIC MATRIX, 10 ML: Brand: FLOSEAL

## (undated) DEVICE — SOLUTION IRRIG 1000ML 0.9% SOD CHL USP POUR PLAS BTL

## (undated) DEVICE — ORTHOLOC 3DI PLATING

## (undated) DEVICE — GLOVE SURG SZ 65 CRM LTX FREE POLYISOPRENE POLYMER BEAD ANTI

## (undated) DEVICE — SUTURE VCRL SZ 1 L27IN ABSRB UD L36MM CP-1 1/2 CIR REV CUT J268H

## (undated) DEVICE — 3M™ TEGADERM™ TRANSPARENT FILM DRESSING FRAME STYLE, 1628, 6 IN X 8 IN (15 CM X 20 CM), 10/CT 8CT/CASE: Brand: 3M™ TEGADERM™

## (undated) DEVICE — GOWN,SIRUS,NONRNF,SETINSLV,XL,20/CS: Brand: MEDLINE

## (undated) DEVICE — SAFEOP STIMULATING BALL-TIP PROBE, STERILE: Brand: SAFEOP

## (undated) DEVICE — TRAY,URINE METER,100% SILICONE,16FR10ML: Brand: MEDLINE

## (undated) DEVICE — INSULATED BLADE ELECTRODE: Brand: EDGE

## (undated) DEVICE — DRAPE MICSCP W51XL150IN FOR LEICA M680 WILD OHS

## (undated) DEVICE — SUTURE PERMAHAND SZ 2-0 L18IN NONABSORBABLE BLK L26MM PS 1588H

## (undated) DEVICE — PTP ACCESS PUSHER CAP: Brand: LIF-PTP

## (undated) DEVICE — DARTFIRE EDGE INSTRUMENT PACK

## (undated) DEVICE — BLUNT DISSECTOR: Brand: ENDO PEANUT

## (undated) DEVICE — SYR BULB 60ML IRRIGATION -- CONVERT TO ITEM 116413

## (undated) DEVICE — FLOSEAL HEMOSTATIC MATRIX, 5 ML: Brand: FLOSEAL

## (undated) DEVICE — DRAPE XR C ARM 41X74IN LF --

## (undated) DEVICE — 4.0MM PRECISION ROUND

## (undated) DEVICE — JAM SHIDI: Brand: XIA PRECISION SYSTEM

## (undated) DEVICE — TUBING, SUCTION, 1/4" X 10', STRAIGHT: Brand: MEDLINE

## (undated) DEVICE — CASPAR DISTRACTION PIN 14MM: Brand: AESCULAP

## (undated) DEVICE — SEALANT HEMOSTAT W/THROM 8ML -- SURGIFLO MATRIX

## (undated) DEVICE — POSTERIOR LAMI VANPLT-LUCAS: Brand: MEDLINE INDUSTRIES, INC.

## (undated) DEVICE — MEDI-VAC NON-CONDUCTIVE SUCTION TUBING: Brand: CARDINAL HEALTH

## (undated) DEVICE — 3M™ IOBAN™ 2 ANTIMICROBIAL INCISE DRAPE 6650EZ: Brand: IOBAN™ 2

## (undated) DEVICE — DRESSING PETRO W3XL8IN OIL EMUL N ADH GZ KNIT IMPREG CELOS

## (undated) DEVICE — SYSTEM LIF ILLUMINATION STERILE

## (undated) DEVICE — KIT POS W/ FOAM ARM CRADL SHEARGUARD CHST PD CVR FOR SPNL

## (undated) DEVICE — DRAIN KT WND 10FR RND 400ML --

## (undated) DEVICE — FOOT & ANKLE SOFT DR WOMACK: Brand: MEDLINE INDUSTRIES, INC.